# Patient Record
Sex: MALE | Race: WHITE | NOT HISPANIC OR LATINO | Employment: OTHER | URBAN - METROPOLITAN AREA
[De-identification: names, ages, dates, MRNs, and addresses within clinical notes are randomized per-mention and may not be internally consistent; named-entity substitution may affect disease eponyms.]

---

## 2018-08-07 ENCOUNTER — OFFICE VISIT CONVERTED (OUTPATIENT)
Dept: PODIATRY | Facility: CLINIC | Age: 73
End: 2018-08-07
Attending: PODIATRIST

## 2019-08-07 ENCOUNTER — OFFICE VISIT CONVERTED (OUTPATIENT)
Dept: PODIATRY | Facility: CLINIC | Age: 74
End: 2019-08-07
Attending: PODIATRIST

## 2019-08-07 ENCOUNTER — HOSPITAL ENCOUNTER (OUTPATIENT)
Dept: LAB | Facility: HOSPITAL | Age: 74
Discharge: HOME | End: 2019-08-07
Attending: INTERNAL MEDICINE

## 2019-08-07 LAB
25(OH)D3 SERPL-MCNC: 40 NG/ML (ref 30–100)
ALBUMIN SERPL-MCNC: 4.3 G/DL (ref 3.5–5)
ALBUMIN/GLOB SERPL: 1.1 {RATIO} (ref 1.4–2.6)
ALP SERPL-CCNC: 99 U/L (ref 56–155)
ALT SERPL-CCNC: 26 U/L (ref 10–40)
AMYLASE SERPL-CCNC: 68 U/L (ref 30–150)
ANION GAP SERPL CALC-SCNC: 19 MMOL/L (ref 8–19)
AST SERPL-CCNC: 22 U/L (ref 15–50)
BASOPHILS # BLD AUTO: 0.03 10*3/UL (ref 0–0.2)
BASOPHILS NFR BLD AUTO: 0.4 % (ref 0–3)
BILIRUB SERPL-MCNC: 0.47 MG/DL (ref 0.2–1.3)
BUN SERPL-MCNC: 20 MG/DL (ref 5–25)
BUN/CREAT SERPL: 15 {RATIO} (ref 6–20)
CALCIUM SERPL-MCNC: 10 MG/DL (ref 8.7–10.4)
CHLORIDE SERPL-SCNC: 99 MMOL/L (ref 99–111)
CHOLEST SERPL-MCNC: 92 MG/DL (ref 107–200)
CHOLEST/HDLC SERPL: 2.2 {RATIO} (ref 3–6)
CONV ABS IMM GRAN: 0.02 10*3/UL (ref 0–0.2)
CONV CO2: 23 MMOL/L (ref 22–32)
CONV IMMATURE GRAN: 0.3 % (ref 0–1.8)
CONV TOTAL PROTEIN: 8.2 G/DL (ref 6.3–8.2)
CREAT UR-MCNC: 1.37 MG/DL (ref 0.7–1.2)
DEPRECATED RDW RBC AUTO: 43.8 FL (ref 35.1–43.9)
EOSINOPHIL # BLD AUTO: 0.16 10*3/UL (ref 0–0.7)
EOSINOPHIL # BLD AUTO: 2.2 % (ref 0–7)
ERYTHROCYTE [DISTWIDTH] IN BLOOD BY AUTOMATED COUNT: 13.5 % (ref 11.6–14.4)
ERYTHROCYTE [SEDIMENTATION RATE] IN BLOOD: 26 MM/H (ref 0–20)
EST. AVERAGE GLUCOSE BLD GHB EST-MCNC: 134 MG/DL
GFR SERPLBLD BASED ON 1.73 SQ M-ARVRAT: 51 ML/MIN/{1.73_M2}
GLOBULIN UR ELPH-MCNC: 3.9 G/DL (ref 2–3.5)
GLUCOSE SERPL-MCNC: 102 MG/DL (ref 70–99)
HBA1C MFR BLD: 6.3 % (ref 3.5–5.7)
HCT VFR BLD AUTO: 42.1 % (ref 42–52)
HDLC SERPL-MCNC: 41 MG/DL (ref 40–60)
HGB BLD-MCNC: 14 G/DL (ref 14–18)
IRON SATN MFR SERPL: 19 % (ref 20–55)
IRON SERPL-MCNC: 72 UG/DL (ref 70–180)
LDLC SERPL CALC-MCNC: 28 MG/DL (ref 70–100)
LIPASE SERPL-CCNC: 73 U/L (ref 5–51)
LYMPHOCYTES # BLD AUTO: 3 10*3/UL (ref 1–5)
LYMPHOCYTES NFR BLD AUTO: 41.8 % (ref 20–45)
MCH RBC QN AUTO: 29.7 PG (ref 27–31)
MCHC RBC AUTO-ENTMCNC: 33.3 G/DL (ref 33–37)
MCV RBC AUTO: 89.2 FL (ref 80–96)
MONOCYTES # BLD AUTO: 0.56 10*3/UL (ref 0.2–1.2)
MONOCYTES NFR BLD AUTO: 7.8 % (ref 3–10)
NEUTROPHILS # BLD AUTO: 3.4 10*3/UL (ref 2–8)
NEUTROPHILS NFR BLD AUTO: 47.5 % (ref 30–85)
NRBC CBCN: 0 % (ref 0–0.7)
OSMOLALITY SERPL CALC.SUM OF ELEC: 285 MOSM/KG (ref 273–304)
PLATELET # BLD AUTO: 257 10*3/UL (ref 130–400)
PMV BLD AUTO: 9.7 FL (ref 9.4–12.4)
POTASSIUM SERPL-SCNC: 4.8 MMOL/L (ref 3.5–5.3)
PSA SERPL-MCNC: 2.02 NG/ML (ref 0–4)
RBC # BLD AUTO: 4.72 10*6/UL (ref 4.7–6.1)
SODIUM SERPL-SCNC: 136 MMOL/L (ref 135–147)
T4 FREE SERPL-MCNC: 1.1 NG/DL (ref 0.9–1.8)
TESTOST SERPL-MCNC: 403 NG/DL (ref 193–740)
TIBC SERPL-MCNC: 388 UG/DL (ref 245–450)
TRANSFERRIN SERPL-MCNC: 271 MG/DL (ref 215–365)
TRIGL SERPL-MCNC: 117 MG/DL (ref 40–150)
TSH SERPL-ACNC: 2.3 M[IU]/L (ref 0.27–4.2)
VLDLC SERPL-MCNC: 23 MG/DL (ref 5–37)
WBC # BLD AUTO: 7.17 10*3/UL (ref 4.8–10.8)

## 2019-08-14 ENCOUNTER — HOSPITAL ENCOUNTER (OUTPATIENT)
Dept: GENERAL RADIOLOGY | Facility: HOSPITAL | Age: 74
Discharge: HOME | End: 2019-08-14
Attending: INTERNAL MEDICINE

## 2019-08-19 ENCOUNTER — OFFICE VISIT CONVERTED (OUTPATIENT)
Dept: SURGERY | Facility: CLINIC | Age: 74
End: 2019-08-19
Attending: SURGERY

## 2019-10-10 ENCOUNTER — HOSPITAL ENCOUNTER (OUTPATIENT)
Dept: GASTROENTEROLOGY | Facility: HOSPITAL | Age: 74
Setting detail: HOSPITAL OUTPATIENT SURGERY
Discharge: HOME | End: 2019-10-10
Attending: SURGERY

## 2019-10-10 LAB — GLUCOSE BLD-MCNC: 84 MG/DL (ref 70–99)

## 2019-10-18 ENCOUNTER — OFFICE VISIT CONVERTED (OUTPATIENT)
Dept: SURGERY | Facility: CLINIC | Age: 74
End: 2019-10-18
Attending: SURGERY

## 2020-02-17 ENCOUNTER — OFFICE VISIT CONVERTED (OUTPATIENT)
Dept: PODIATRY | Facility: CLINIC | Age: 75
End: 2020-02-17
Attending: PODIATRIST

## 2020-03-09 ENCOUNTER — HOSPITAL ENCOUNTER (OUTPATIENT)
Dept: LAB | Facility: HOSPITAL | Age: 75
Discharge: HOME | End: 2020-03-09
Attending: INTERNAL MEDICINE

## 2020-03-09 ENCOUNTER — HOSPITAL ENCOUNTER (OUTPATIENT)
Dept: GENERAL RADIOLOGY | Facility: HOSPITAL | Age: 75
Discharge: HOME | End: 2020-03-09
Attending: INTERNAL MEDICINE

## 2020-03-09 LAB
ALBUMIN SERPL-MCNC: 4.1 G/DL (ref 3.5–5)
ALBUMIN/GLOB SERPL: 1.2 {RATIO} (ref 1.4–2.6)
ALP SERPL-CCNC: 83 U/L (ref 56–155)
ALT SERPL-CCNC: 20 U/L (ref 10–40)
ANION GAP SERPL CALC-SCNC: 18 MMOL/L (ref 8–19)
AST SERPL-CCNC: 19 U/L (ref 15–50)
BASOPHILS # BLD AUTO: 0.04 10*3/UL (ref 0–0.2)
BASOPHILS NFR BLD AUTO: 0.6 % (ref 0–3)
BILIRUB SERPL-MCNC: 0.36 MG/DL (ref 0.2–1.3)
BUN SERPL-MCNC: 14 MG/DL (ref 5–25)
BUN/CREAT SERPL: 12 {RATIO} (ref 6–20)
CALCIUM SERPL-MCNC: 9.2 MG/DL (ref 8.7–10.4)
CHLORIDE SERPL-SCNC: 103 MMOL/L (ref 99–111)
CHOLEST SERPL-MCNC: 96 MG/DL (ref 107–200)
CHOLEST/HDLC SERPL: 2.5 {RATIO} (ref 3–6)
CONV ABS IMM GRAN: 0.03 10*3/UL (ref 0–0.2)
CONV CO2: 21 MMOL/L (ref 22–32)
CONV IMMATURE GRAN: 0.4 % (ref 0–1.8)
CONV TOTAL PROTEIN: 7.4 G/DL (ref 6.3–8.2)
CREAT BLD-MCNC: 1.2 MG/DL (ref 0.6–1.4)
CREAT UR-MCNC: 1.17 MG/DL (ref 0.7–1.2)
DEPRECATED RDW RBC AUTO: 47.2 FL (ref 35.1–43.9)
EOSINOPHIL # BLD AUTO: 0.17 10*3/UL (ref 0–0.7)
EOSINOPHIL # BLD AUTO: 2.4 % (ref 0–7)
ERYTHROCYTE [DISTWIDTH] IN BLOOD BY AUTOMATED COUNT: 14.7 % (ref 11.6–14.4)
EST. AVERAGE GLUCOSE BLD GHB EST-MCNC: 134 MG/DL
GFR SERPLBLD BASED ON 1.73 SQ M-ARVRAT: 60 ML/MIN/{1.73_M2}
GFR SERPLBLD BASED ON 1.73 SQ M-ARVRAT: >60 ML/MIN/{1.73_M2}
GLOBULIN UR ELPH-MCNC: 3.3 G/DL (ref 2–3.5)
GLUCOSE SERPL-MCNC: 86 MG/DL (ref 70–99)
HBA1C MFR BLD: 6.3 % (ref 3.5–5.7)
HCT VFR BLD AUTO: 41 % (ref 42–52)
HDLC SERPL-MCNC: 38 MG/DL (ref 40–60)
HGB BLD-MCNC: 13.4 G/DL (ref 14–18)
LDLC SERPL CALC-MCNC: 38 MG/DL (ref 70–100)
LYMPHOCYTES # BLD AUTO: 3.01 10*3/UL (ref 1–5)
LYMPHOCYTES NFR BLD AUTO: 42.2 % (ref 20–45)
MCH RBC QN AUTO: 28.8 PG (ref 27–31)
MCHC RBC AUTO-ENTMCNC: 32.7 G/DL (ref 33–37)
MCV RBC AUTO: 88.2 FL (ref 80–96)
MONOCYTES # BLD AUTO: 0.71 10*3/UL (ref 0.2–1.2)
MONOCYTES NFR BLD AUTO: 10 % (ref 3–10)
NEUTROPHILS # BLD AUTO: 3.17 10*3/UL (ref 2–8)
NEUTROPHILS NFR BLD AUTO: 44.4 % (ref 30–85)
NRBC CBCN: 0 % (ref 0–0.7)
OSMOLALITY SERPL CALC.SUM OF ELEC: 284 MOSM/KG (ref 273–304)
PLATELET # BLD AUTO: 245 10*3/UL (ref 130–400)
PMV BLD AUTO: 9.5 FL (ref 9.4–12.4)
POTASSIUM SERPL-SCNC: 4.5 MMOL/L (ref 3.5–5.3)
RBC # BLD AUTO: 4.65 10*6/UL (ref 4.7–6.1)
SODIUM SERPL-SCNC: 137 MMOL/L (ref 135–147)
TRIGL SERPL-MCNC: 100 MG/DL (ref 40–150)
VLDLC SERPL-MCNC: 20 MG/DL (ref 5–37)
WBC # BLD AUTO: 7.13 10*3/UL (ref 4.8–10.8)

## 2020-08-07 ENCOUNTER — OFFICE VISIT CONVERTED (OUTPATIENT)
Dept: PODIATRY | Facility: CLINIC | Age: 75
End: 2020-08-07
Attending: PODIATRIST

## 2020-09-04 ENCOUNTER — HOSPITAL ENCOUNTER (OUTPATIENT)
Dept: GENERAL RADIOLOGY | Facility: HOSPITAL | Age: 75
Discharge: HOME | End: 2020-09-04
Attending: INTERNAL MEDICINE

## 2020-09-28 ENCOUNTER — HOSPITAL ENCOUNTER (OUTPATIENT)
Dept: LAB | Facility: HOSPITAL | Age: 75
Discharge: HOME | End: 2020-09-28
Attending: INTERNAL MEDICINE

## 2020-09-28 LAB
ALBUMIN SERPL-MCNC: 4.1 G/DL (ref 3.5–5)
ALBUMIN/GLOB SERPL: 1.3 {RATIO} (ref 1.4–2.6)
ALP SERPL-CCNC: 85 U/L (ref 56–155)
ALT SERPL-CCNC: 21 U/L (ref 10–40)
ANION GAP SERPL CALC-SCNC: 15 MMOL/L (ref 8–19)
AST SERPL-CCNC: 19 U/L (ref 15–50)
BASOPHILS # BLD AUTO: 0.03 10*3/UL (ref 0–0.2)
BASOPHILS NFR BLD AUTO: 0.4 % (ref 0–3)
BILIRUB SERPL-MCNC: 0.26 MG/DL (ref 0.2–1.3)
BUN SERPL-MCNC: 18 MG/DL (ref 5–25)
BUN/CREAT SERPL: 15 {RATIO} (ref 6–20)
CALCIUM SERPL-MCNC: 9.6 MG/DL (ref 8.7–10.4)
CHLORIDE SERPL-SCNC: 105 MMOL/L (ref 99–111)
CHOLEST SERPL-MCNC: 134 MG/DL (ref 107–200)
CHOLEST/HDLC SERPL: 3.7 {RATIO} (ref 3–6)
CONV ABS IMM GRAN: 0.03 10*3/UL (ref 0–0.2)
CONV CO2: 24 MMOL/L (ref 22–32)
CONV IMMATURE GRAN: 0.4 % (ref 0–1.8)
CONV TOTAL PROTEIN: 7.2 G/DL (ref 6.3–8.2)
CREAT UR-MCNC: 1.22 MG/DL (ref 0.7–1.2)
DEPRECATED RDW RBC AUTO: 46.5 FL (ref 35.1–43.9)
EOSINOPHIL # BLD AUTO: 0.15 10*3/UL (ref 0–0.7)
EOSINOPHIL # BLD AUTO: 1.8 % (ref 0–7)
ERYTHROCYTE [DISTWIDTH] IN BLOOD BY AUTOMATED COUNT: 13.7 % (ref 11.6–14.4)
EST. AVERAGE GLUCOSE BLD GHB EST-MCNC: 131 MG/DL
GFR SERPLBLD BASED ON 1.73 SQ M-ARVRAT: 58 ML/MIN/{1.73_M2}
GLOBULIN UR ELPH-MCNC: 3.1 G/DL (ref 2–3.5)
GLUCOSE SERPL-MCNC: 94 MG/DL (ref 70–99)
HBA1C MFR BLD: 6.2 % (ref 3.5–5.7)
HCT VFR BLD AUTO: 40.9 % (ref 42–52)
HDLC SERPL-MCNC: 36 MG/DL (ref 40–60)
HGB BLD-MCNC: 13.1 G/DL (ref 14–18)
LDLC SERPL CALC-MCNC: 50 MG/DL (ref 70–100)
LYMPHOCYTES # BLD AUTO: 2.82 10*3/UL (ref 1–5)
LYMPHOCYTES NFR BLD AUTO: 34.7 % (ref 20–45)
MCH RBC QN AUTO: 29.4 PG (ref 27–31)
MCHC RBC AUTO-ENTMCNC: 32 G/DL (ref 33–37)
MCV RBC AUTO: 91.7 FL (ref 80–96)
MONOCYTES # BLD AUTO: 0.8 10*3/UL (ref 0.2–1.2)
MONOCYTES NFR BLD AUTO: 9.9 % (ref 3–10)
NEUTROPHILS # BLD AUTO: 4.29 10*3/UL (ref 2–8)
NEUTROPHILS NFR BLD AUTO: 52.8 % (ref 30–85)
NRBC CBCN: 0 % (ref 0–0.7)
OSMOLALITY SERPL CALC.SUM OF ELEC: 290 MOSM/KG (ref 273–304)
PLATELET # BLD AUTO: 243 10*3/UL (ref 130–400)
PMV BLD AUTO: 10.2 FL (ref 9.4–12.4)
POTASSIUM SERPL-SCNC: 4.8 MMOL/L (ref 3.5–5.3)
RBC # BLD AUTO: 4.46 10*6/UL (ref 4.7–6.1)
SODIUM SERPL-SCNC: 139 MMOL/L (ref 135–147)
TRIGL SERPL-MCNC: 241 MG/DL (ref 40–150)
VLDLC SERPL-MCNC: 48 MG/DL (ref 5–37)
WBC # BLD AUTO: 8.12 10*3/UL (ref 4.8–10.8)

## 2021-05-13 NOTE — PROGRESS NOTES
Progress Note      Patient Name: Sajan Hermosillo   Patient ID: [id number]   Sex: Male   YOB: 1945    Primary Care Provider: Juarez Arrington MD   Referring Provider: Juarez Arrington MD    Visit Date: August 7, 2020    Provider: Jeff Gross DPM   Location: [facility]   Location Address: [address] [place] [address]  [id number]   Location Phone: [phone]          Chief Complaint  · Diabetic Foot Evaluation      History Of Present Illness  Sajan Hermosillo presents to the office today as a Follow-Up for a diabetic foot evaluation.   Patient reports that he is a diabetic currently controlling diabetes with insulin and oral medication.      New, Established, New Problem:  Established   Location:  Feet bilaterally  Duration: 2013  Onset:  Gradual  Nature:  IDDM  Stable, worsening, improving: Stable   Aggravating factors:   None reported  Previous Treatment:  Treating with oral medication and insulin.    Patient denies any fevers, chills, nausea, vomiting, shortness of breath or any other constitutional signs nor symptoms.      Patient reports that no changes in their medications with their recent appointment with their primary care provider.    The patient's most recent blood glucose reading was 96.    Pt states their most recent HgB A1C was 6.2.       Past Medical History  Abdominal pain; Ankle pain; Arthritis; Fracture; Heel pain; High cholesterol; Hyperlipemia; Hypertension; Ingrowing toenail; Limb Swelling; Prostate Disorder; Seasonal allergies; Type 2 diabetes mellitus         Past Surgical History  Artificial Joints/Limbs; Colonoscopy; EGD; Joint Surgery; Metal implants         Medication List  Aspir-81 81 mg oral tablet,delayed release (DR/EC); atorvastatin 40 mg oral tablet; cetirizine 10 mg oral tablet; citalopram 20 mg oral tablet; fluticasone 50 mcg/actuation nasal spray,suspension; Lantus 100 unit/mL subcutaneous solution; lisinopril  "20 mg oral tablet; metformin 1,000 mg oral tablet; montelukast 10 mg oral tablet; Naprosyn 500 mg oral tablet; naproxen 500 mg oral tablet; Nexium 40 mg oral capsule,delayed release(DR/EC); nitroglycerin 0.4 mg sublingual tablet, sublingual; olopatadine 0.1 % ophthalmic drops; pramipexole 0.25 mg oral tablet; prazosin 2 mg oral capsule; Refresh Tears 0.5 % ophthalmic drops; Toprol XL 50 mg oral tablet extended release 24 hr         Allergy List  hydrocodone bitartrate; hydrocodone-acetaminophen; hydrocodone-ibuprofen; penicillin V potassium; PENICILLINS         Family Medical History  Heart Disease; Diabetes, unspecified type; Family history of certain chronic disabling diseases; arthritis         Social History  Alcohol Use (Never); lives with spouse; .; Recreational Drug Use (Never); Retired.; Tobacco (Former)         Review of Systems  · Constitutional  o Denies  o : fatigue, night sweats  · Eyes  o Denies  o : double vision, blurred vision  · HENT  o Denies  o : vertigo, recent head injury  · Cardiovascular  o Denies  o : chest pain, irregular heart beats  · Respiratory  o Denies  o : shortness of breath, productive cough  · Gastrointestinal  o Denies  o : nausea, vomiting  · Genitourinary  o Denies  o : dysuria, urinary retention  · Integument  o * See HPI  · Neurologic  o Denies  o : altered mental status, seizures  · Musculoskeletal  o Denies  o : joint swelling, limitation of motion  · Endocrine  o Denies  o : cold intolerance, heat intolerance  · Heme-Lymph  o Denies  o : petechiae, lymph node enlargement or tenderness  · Allergic-Immunologic  o Denies  o : frequent illnesses      Vitals  Date Time BP Position Site L\R Cuff Size HR RR TEMP (F) WT  HT  BMI kg/m2 BSA m2 O2 Sat HC       08/07/2020 08:24 /59 Sitting    67 - R  97.5 221lbs 0oz 5'  10\" 31.71 2.23 99 %          Physical Examination  · Constitutional  o Appearance  o : awake, alert, well-developed, and well nourished "   · Cardiovascular  o Peripheral Vascular System  o :   § Extremities  § : no edema noted  · Musculoskeletal  o Extremeties/Joint  o : Lower extremity muscle strength and range of motion is equal and symmetrical bilaterally. The knees are noted to be in normal alignment. Ankle alignment and range of motion is normal and foot structure is normal. Subtalar, metatarsal and metatarsal-phalangeal joint range of motion is noted to be within normal limits on the Left foot. No range of motion of the Choparts and Subtalar joints consistent with the patients surgical history on the Right. The digits of both feet are in normal alignment. The gait is normal.  · Left DM Foot Exam  o Sensation  o : Sharp/dull sensation is within normal limits. [place]-Krishna 5.07 monofilament intact to all assessed areas.   o Visual Inspection  o : Skin is noted to have normal texture and turgor, with no excrescences noted. The toenails are noted to be without disease  o Vascular  o : palpable dorsalis pedis and posterir tibialis pulses present, normal capillary refill  · Right DM Foot Exam  o Sensation  o : Sharp/dull sensation is within normal limits. [place]-Krishna 5.07 monofilament intact to all assessed areas.   o Visual Inspection  o : Skin is noted to have normal texture and turgor, with no excrescences noted. The toenails are noted to be without disease  o Vascular  o : palpable dorsalis pedis and posterir tibialis pulses present, normal capillary refill          Assessment  · Type 2 diabetes mellitus     250.00/E11.9      Plan  · Orders  o Diabetic Foot (Motor and Sensory) Exam Completed [facility] (, , 2030F) - - 08/07/2020  · Medications  o Medications have been Reconciled  o Transition of Care or Provider Policy  · Instructions  o I have discussed the findings of this evaluation with the patient. The discussion included a complete verbal explanation of any changes in the examination results, diagnosis, and the current treatment  plan. A schedule for future care needs was explained. If any questions should arise after returning home, I have encouraged the patient to feel free to contact Dr. Gross. The patient states understanding and agreement with this plan.   o Patient is to monitor for problems and to contact Dr. Gross for follow-up should such signs occur. Patient states understanding and agreement with this plan.   o Encouraged to follow-up with Primary Care Provider for preventative care.   o Diabetic foot exam performed and documented this date, compliant with CQM required standards. Detail of findings as noted in physical exam.Lower extremity Neuro exam for diabetic patient performed and documented this date, compliant with PQRS required standards. Detail of findings as noted in physical exam.Advised patient importance of good routine lower extremity hygiene. Advised patient importance of evaluating for intact skin and pain free nail borders. Advised patient to use mirror to evaluate plantar/ soles of feet for better visualization. Advised patient monitor and phone office to be seen if any cracking to skin, open lesions, painful nail borders or if nails become elongated prior to next visit. Advised patient importance of daily cleansing of lower extremities, followed by good skin cream to maintain normal hydration of skin. Also advised patient importance of close daily monitoring of blood sugar. Advised to regulate diet and medications to maintain control of blood sugar in optimal range. Contact primary care provider if difficulties maintaining blood sugar levels.Advised Patient of presence of Diabetes Mellitus condition. Advised Patient risk of progression and worsening or improvement, then return of condition. Will monitor condition for any change in future. Treat with most appropriate treatment pending status of condition. Counseled and advised patient extensively on nature and ramifications of diabetes. Standard instructions  given to patient for good diabetic foot care and maintenance. Advised importance of careful monitoring to avoid break down and complications secondary to diabetes. Advised patient importance of strict maintenance of blood sugar control. Advised patient of possible ominous results from neglect of condition, i.e.: amputation/ loss of digits, feet and legs, or even death.Patient states understands counseling, will monitor closely, continue good hygiene and routine diabetic foot care. Patient will contact office is questions or problems.   o Follow up in one year for Podiatric Diabetic Evaluation.   o Electronically Identified Patient Education Materials Provided Electronically  · Disposition  o Call or Return if symptoms worsen or persist.            Electronically Signed by: Jeff Gross DPM -Author on August 7, 2020 08:55:50 AM

## 2021-05-15 VITALS — WEIGHT: 213 LBS | RESPIRATION RATE: 14 BRPM | BODY MASS INDEX: 30.49 KG/M2 | HEIGHT: 70 IN

## 2021-05-15 VITALS — WEIGHT: 210 LBS | HEIGHT: 70 IN | RESPIRATION RATE: 18 BRPM | BODY MASS INDEX: 30.06 KG/M2

## 2021-05-15 VITALS
BODY MASS INDEX: 31.64 KG/M2 | TEMPERATURE: 97.5 F | HEIGHT: 70 IN | WEIGHT: 221 LBS | SYSTOLIC BLOOD PRESSURE: 116 MMHG | HEART RATE: 67 BPM | OXYGEN SATURATION: 99 % | DIASTOLIC BLOOD PRESSURE: 59 MMHG

## 2021-05-15 VITALS
DIASTOLIC BLOOD PRESSURE: 69 MMHG | WEIGHT: 220 LBS | HEART RATE: 72 BPM | OXYGEN SATURATION: 98 % | BODY MASS INDEX: 31.5 KG/M2 | HEIGHT: 70 IN | SYSTOLIC BLOOD PRESSURE: 132 MMHG

## 2021-05-15 VITALS
HEIGHT: 70 IN | HEART RATE: 67 BPM | BODY MASS INDEX: 30.49 KG/M2 | SYSTOLIC BLOOD PRESSURE: 117 MMHG | DIASTOLIC BLOOD PRESSURE: 64 MMHG | OXYGEN SATURATION: 100 % | WEIGHT: 213 LBS

## 2021-05-16 VITALS — OXYGEN SATURATION: 98 % | HEIGHT: 70 IN | BODY MASS INDEX: 32.21 KG/M2 | WEIGHT: 225 LBS | HEART RATE: 67 BPM

## 2021-06-04 ENCOUNTER — HOSPITAL ENCOUNTER (OUTPATIENT)
Dept: GENERAL RADIOLOGY | Facility: HOSPITAL | Age: 76
Discharge: HOME | End: 2021-06-04
Attending: INTERNAL MEDICINE

## 2021-08-06 ENCOUNTER — OFFICE VISIT (OUTPATIENT)
Dept: PODIATRY | Facility: CLINIC | Age: 76
End: 2021-08-06

## 2021-08-06 VITALS
HEIGHT: 70 IN | WEIGHT: 212 LBS | OXYGEN SATURATION: 98 % | HEART RATE: 67 BPM | SYSTOLIC BLOOD PRESSURE: 119 MMHG | BODY MASS INDEX: 30.35 KG/M2 | DIASTOLIC BLOOD PRESSURE: 64 MMHG | TEMPERATURE: 97.8 F

## 2021-08-06 DIAGNOSIS — E11.8 DIABETIC FOOT (HCC): Primary | ICD-10-CM

## 2021-08-06 DIAGNOSIS — E11.9 NON-INSULIN DEPENDENT TYPE 2 DIABETES MELLITUS (HCC): ICD-10-CM

## 2021-08-06 PROCEDURE — 99213 OFFICE O/P EST LOW 20 MIN: CPT | Performed by: PODIATRIST

## 2021-08-06 PROCEDURE — G8404 LOW EXTEMITY NEUR EXAM DOCUM: HCPCS | Performed by: PODIATRIST

## 2021-08-06 RX ORDER — CITALOPRAM 20 MG/1
TABLET ORAL
COMMUNITY

## 2021-08-06 RX ORDER — MONTELUKAST SODIUM 10 MG/1
TABLET ORAL
COMMUNITY

## 2021-08-06 RX ORDER — ASPIRIN 81 MG/1
TABLET ORAL
COMMUNITY

## 2021-08-06 RX ORDER — LISINOPRIL 20 MG/1
TABLET ORAL
COMMUNITY

## 2021-08-06 RX ORDER — CARBOXYMETHYLCELLULOSE SODIUM 5 MG/ML
1 SOLUTION/ DROPS OPHTHALMIC
COMMUNITY

## 2021-08-06 RX ORDER — ATORVASTATIN CALCIUM 40 MG/1
TABLET, FILM COATED ORAL
COMMUNITY

## 2021-08-06 RX ORDER — METOPROLOL SUCCINATE 50 MG/1
TABLET, EXTENDED RELEASE ORAL
COMMUNITY

## 2021-08-06 RX ORDER — FLUTICASONE PROPIONATE 50 MCG
SPRAY, SUSPENSION (ML) NASAL
COMMUNITY

## 2021-08-06 RX ORDER — INSULIN GLARGINE 100 [IU]/ML
50 INJECTION, SOLUTION SUBCUTANEOUS DAILY
COMMUNITY

## 2021-08-06 RX ORDER — CETIRIZINE HYDROCHLORIDE 10 MG/1
TABLET ORAL
COMMUNITY

## 2021-08-06 RX ORDER — ESOMEPRAZOLE MAGNESIUM 40 MG/1
CAPSULE, DELAYED RELEASE ORAL
COMMUNITY

## 2021-08-06 RX ORDER — KETOCONAZOLE 20 MG/G
CREAM TOPICAL
COMMUNITY

## 2021-08-06 RX ORDER — NITROGLYCERIN 0.4 MG/1
TABLET SUBLINGUAL
COMMUNITY

## 2021-08-06 NOTE — PROGRESS NOTES
[facility] - PODIATRY    Today's Date: 08/06/21    Patient Name: Sajan Hermosillo  MRN: [id number]  CSN: [id number]  PCP: Juarez Arrington MD  Referring Provider: No ref. provider found    SUBJECTIVE     Chief Complaint   Patient presents with   • Left Foot - Diabetes, Annual Exam   • Right Foot - Diabetes, Annual Exam     HPI: Sajan Hermosillo, a 76 y.o.male, comes presents to clinic for a diabetic foot evaluation.    New, Established, New Problem: established    Location: Bilateral feet    Duration: 2013    Onset: Insidious    Nature: NIDDM    Stable, worsening, improving: Stable    Patient controlling diabetes via: Oral medications    Patient states their most recent blood glucose reading was 97.    Patient denies any fevers, chills, nausea, vomiting, shortness of breathe, nor any other constitutional signs nor symptoms.    No other pedal complaints at this time.    Past Medical History:   Diagnosis Date   • Abdominal pain    • Ankle pain    • Arthritis    • Fracture    • Heel pain    • High cholesterol    • HTN (hypertension)    • Hyperlipemia    • Ingrowing toenail    • Limb swelling    • Prostate disorder    • Seasonal allergies    • Type 2 diabetes mellitus (CMS/HCC)      Past Surgical History:   Procedure Laterality Date   • COLONOSCOPY     • ENDOSCOPY     • JOINT REPLACEMENT     • OTHER SURGICAL HISTORY      Artificial joints/limbs   • OTHER SURGICAL HISTORY      Metal Implants     Family History   Problem Relation Age of Onset   • Heart disease Mother    • Diabetes Mother         unspecified type   • Arthritis Mother    • Arthritis Father    • Heart disease Sister    • Diabetes Sister         unspecified type   • Heart disease Brother    • Arthritis Brother      Social History     Socioeconomic History   • Marital status:      Spouse name: Not on file   • Number of children: Not on file   • Years of education: Not on file   • Highest education level: Not on file   Tobacco Use   •  Smoking status: Former Smoker     Types: Cigarettes   • Smokeless tobacco: Former User   Vaping Use   • Vaping Use: Never used   Substance and Sexual Activity   • Alcohol use: Never   • Drug use: Never   • Sexual activity: Defer     Allergies   Allergen Reactions   • Hydrocodone Rash   • Hydrocodone-Acetaminophen Rash   • Hydrocodone-Ibuprofen Rash   • Oxycodone Rash   • Penicillins Rash     Current Outpatient Medications   Medication Sig Dispense Refill   • aspirin (aspirin) 81 MG EC tablet Aspir-81 81 mg oral tablet,delayed release (DR/EC) take 1 tablet (81 mg) by oral route once daily   Active     • atorvastatin (LIPITOR) 40 MG tablet atorvastatin 40 mg oral tablet take 1 tablet (40 mg) by oral route once daily   Active     • carboxymethylcellulose (REFRESH PLUS) 0.5 % solution 1 drop.     • cetirizine (zyrTEC) 10 MG tablet cetirizine 10 mg oral tablet take 1 tablet (10 mg) by oral route once daily   Active     • citalopram (CeleXA) 20 MG tablet citalopram 20 mg oral tablet take 1 tablet (20 mg) by oral route once daily   Active     • diclofenac (VOLTAREN) 50 MG EC tablet Take 50 mg by mouth 2 (Two) Times a Day.     • esomeprazole (nexIUM) 40 MG capsule Nexium 40 mg oral capsule,delayed release(DR/EC) take 1 capsule (40 mg) by oral route once daily   Active     • fluticasone (FLONASE) 50 MCG/ACT nasal spray fluticasone 50 mcg/actuation nasal spray,suspension spray 1 - 2 sprays (50 - 100 mcg) in each nostril by intranasal route once daily as needed   Active     • insulin glargine (Lantus) 100 UNIT/ML injection Lantus 100 unit/mL subcutaneous solution inject by subcutaneous route as per insulin protocol   Active     • ketoconazole (NIZORAL) 2 % cream ketoconazole 2 % topical cream apply to the affected area(s) by topical route once daily   Suspended     • lisinopril (PRINIVIL,ZESTRIL) 20 MG tablet lisinopril 20 mg oral tablet take 1 tablet (20 mg) by oral route once daily   Active     • metFORMIN (GLUCOPHAGE) 1000  MG tablet metformin 1,000 mg oral tablet take 1 tablet (1,000 mg) by oral route 2 times per day with morning and evening meals   Active     • metoprolol succinate XL (Toprol XL) 50 MG 24 hr tablet Toprol XL 50 mg oral tablet extended release 24 hr take 1 tablet (50 mg) by oral route once daily   Active     • montelukast (SINGULAIR) 10 MG tablet montelukast 10 mg oral tablet take 1 tablet (10 mg) by oral route once daily in the evening   Active     • nitroglycerin (NITROSTAT) 0.4 MG SL tablet nitroglycerin 0.4 mg sublingual tablet, sublingual place 1 tablet (0.4 mg) by buccal route at the first sign of an attack; no more than 3 tabs are recommended within a 15 minute period.   Active     • Semaglutide, 1 MG/DOSE, (OZEMPIC) 2 MG/1.5ML solution pen-injector Inject  under the skin into the appropriate area as directed 1 (One) Time Per Week.       No current facility-administered medications for this visit.     Review of Systems   Constitutional: Negative.    All other systems reviewed and are negative.      OBJECTIVE     Vitals:    08/06/21 0747   BP: 119/64   Pulse: 67   Temp: 97.8 °F (36.6 °C)   SpO2: 98%       Body mass index is 30.42 kg/m².    Lab Results   Component Value Date    HGBA1C 6.2 (H) 09/28/2020       Lab Results   Component Value Date    CALCIUM 9.6 09/28/2020     09/28/2020    K 4.8 09/28/2020    CO2 24 09/28/2020     09/28/2020    BUN 18 09/28/2020    CREATININE 1.22 (H) 09/28/2020    BCR 15 09/28/2020    ANIONGAP 15 09/28/2020       Patient seen in no apparent distress.      PHYSICAL EXAM:     Foot/Ankle Exam:       General:   Appearance: appears stated age and healthy    Orientation: AAOx3    Affect: appropriate    Gait: unimpaired    Shoe Gear:  Casual shoes    VASCULAR      Right Foot Vascularity   Normal vascular exam    Dorsalis pedis:  2+  Posterior tibial:  2+  Skin Temperature: warm    Edema Grading:  None  CFT:  < 3 seconds  Pedal Hair Growth:  Present  Varicosities: none        Left Foot Vascularity   Normal vascular exam    Dorsalis pedis:  2+  Posterior tibial:  2+  Skin Temperature: warm    Edema Grading:  None  CFT:  < 3 seconds  Pedal Hair Growth:  Present  Varicosities: none        NEUROLOGIC     Right Foot Neurologic   Normal sensation    Light touch sensation:  Normal  Vibratory sensation:  Normal  Hot/Cold sensation: normal    Protective Sensation using [place]-Krishna Monofilament:  10     Left Foot Neurologic   Normal sensation    Light touch sensation:  Normal  Vibratory sensation:  Normal  Hot/cold sensation: normal    Protective Sensation using [place]-Krishna Monofilament:  10     MUSCULOSKELETAL      Left Foot Musculoskeletal   Hallux limitus: Yes       MUSCLE STRENGTH     Right Foot Muscle Strength   Normal strength    Foot dorsiflexion:  5  Foot plantar flexion:  5  Foot inversion:  5  Foot eversion:  5     Left Foot Muscle Strength   Normal strength    Foot dorsiflexion:  5  Foot plantar flexion:  5  Foot inversion:  5  Foot eversion:  5     RANGE OF MOTION      Right Foot Range of Motion   Foot and ankle ROM within normal limits    Right ankle active dorsiflexion: No range of motion of Chopart's and subtalar joints consistent with past surgical history of fusions of these joints.     Left Foot Range of Motion   Foot and ankle ROM within normal limits       DERMATOLOGIC     Right Foot Dermatologic   Skin: skin intact    Nails: normal       Left Foot Dermatologic   Skin: skin intact    Nails: normal              ASSESSMENT/PLAN     Diagnoses and all orders for this visit:    1. Diabetic foot (CMS/HCC) (Primary)    2. Non-insulin dependent type 2 diabetes mellitus (CMS/HCC)        Comprehensive lower extremity examination and evaluation was performed.    Discussed findings and treatment plan including risks, benefits, and treatment options with patient in detail. Patient agreed with treatment plan.    Diabetic foot exam performed and documented this date, compliant with  CQM required standards. Detail of findings as noted in physical exam.  Lower extremity Neurologic exam for diabetic patient performed and documented this date, compliant with PQRS required standards. Detail of findings as noted in physical exam.  Advised patient importance of good routine lower extremity hygiene. Advised patient importance of evaluating for intact skin and pain free nail borders.  Advised patient to use mirror to evaluate plantar/ soles of feet for better visualization. Advised patient monitor and phone office to be seen if any cracking to skin, open lesions, painful nail borders or if nails become elongated prior to next visit. Advised patient importance of daily cleansing of lower extremities, followed by good skin cream to maintain normal hydration of skin. Also advised patient importance of close daily monitoring of blood sugar. Advised to regulate diet and medications to maintain control of blood sugar in optimal range. Contact primary care provider if difficulties maintaining blood sugar levels.  Advised Patient of presence of Diabetes Mellitus condition.  Advised Patient risk of progression and worsening or improvement, then return of condition.  Will monitor condition for any change in future. Treat with most appropriate treatment pending status of condition.  Counseled and advised patient extensively on nature and ramifications of diabetes. Standard instructions given to patient for good diabetic foot care and maintenance. Advised importance of careful monitoring to avoid break down and complications secondary to diabetes. Advised patient importance of strict maintenance of blood sugar control. Advised patient of possible ominous results from neglect of condition, i.e.: amputation/ loss of digits, feet and legs, or even death.  Patient states understands counseling, will monitor closely, continue good hygiene and routine diabetic foot care. Patient will contact office is questions or  problems.      An After Visit Summary was printed and given to the patient at discharge, including (if requested) any available informative/educational handouts regarding diagnosis, treatment, or medications. All questions were answered to patient/family satisfaction. Should symptoms fail to improve or worsen they agree to call or return to clinic or to go to the Emergency Department. Discussed the importance of following up with any needed screening tests/labs/specialist appointments and any requested follow-up recommended by me today. Importance of maintaining follow-up discussed and patient accepts that missed appointments can delay diagnosis and potentially lead to worsening of conditions.    Return for Podiatry Diabetic Foot Exam., or sooner if acute issues arise.    This document has been electronically signed by Jeff Gross DPM on August 6, 2021 08:24 EDT

## 2021-09-25 NOTE — PROGRESS NOTES
"Chief Complaint/ HPI:   Follow-up (6 month/labs) and Hypertension  Patient is here to follow-up with his labs and blood pressure concerns,    Objective   Vital Signs  Vitals:    09/29/21 1233   BP: 125/76   BP Location: Left arm   Patient Position: Sitting   Cuff Size: Adult   Pulse: 75   Resp: 18   Temp: 99.1 °F (37.3 °C)   TempSrc: Tympanic   SpO2: 96%   Weight: 93.4 kg (206 lb)   Height: 177.8 cm (70\")      Body mass index is 29.56 kg/m².  Review of Systems   Constitutional: Negative.    HENT: Negative.    Eyes: Negative.    Respiratory: Negative.    Cardiovascular: Negative.    Gastrointestinal: Negative.    Endocrine: Negative.    Genitourinary: Negative.    Musculoskeletal: Negative.    Allergic/Immunologic: Negative.    Neurological: Negative.    Hematological: Negative.    Psychiatric/Behavioral: Negative.       Physical Exam  Constitutional:       General: He is not in acute distress.     Appearance: Normal appearance.   HENT:      Head: Normocephalic.      Mouth/Throat:      Mouth: Mucous membranes are moist.   Eyes:      Conjunctiva/sclera: Conjunctivae normal.      Pupils: Pupils are equal, round, and reactive to light.   Cardiovascular:      Rate and Rhythm: Normal rate and regular rhythm.      Pulses: Normal pulses.      Heart sounds: Normal heart sounds.   Pulmonary:      Effort: Pulmonary effort is normal.      Breath sounds: Normal breath sounds.   Abdominal:      General: Abdomen is flat. Bowel sounds are normal.      Palpations: Abdomen is soft.   Musculoskeletal:         General: No swelling. Normal range of motion.      Cervical back: Neck supple.   Skin:     General: Skin is warm and dry.      Coloration: Skin is not jaundiced.   Neurological:      General: No focal deficit present.      Mental Status: He is alert and oriented to person, place, and time. Mental status is at baseline.   Psychiatric:         Mood and Affect: Mood normal.         Behavior: Behavior normal.         Thought Content: " Thought content normal.         Judgment: Judgment normal.     Low back area shows no rash, focal tenderness to the left SI joint area,  Result Review :   No results found for: PROBNP, BNP          Lab Results   Component Value Date    TSH 2.300 08/07/2019      Lab Results   Component Value Date    FREET4 1.1 08/07/2019                            ICD-10-CM ICD-9-CM   1. Acute right-sided low back pain without sciatica  M54.5 724.2   2. Gastroesophageal reflux disease without esophagitis  K21.9 530.81   3. Allergic rhinitis due to pollen, unspecified seasonality  J30.1 477.0   4. Hypertension, essential  I10 401.9   5. Mixed hyperlipidemia  E78.2 272.2   6. RLS (restless legs syndrome)  G25.81 333.94   7. Diabetes 1.5, managed as type 2 (HCC)  E13.9 250.00   8. Screening PSA (prostate specific antigen)  Z12.5 V76.44       Assessment and Plan    Diagnoses and all orders for this visit:    1. Acute right-sided low back pain without sciatica (Primary)  -     PSA Screen; Future  -     Hemoglobin A1c; Future  -     Comprehensive Metabolic Panel; Future  -     CBC & Differential; Future  -     Lipid Panel; Future    2. Gastroesophageal reflux disease without esophagitis  -     PSA Screen; Future  -     Hemoglobin A1c; Future  -     Comprehensive Metabolic Panel; Future  -     CBC & Differential; Future  -     Lipid Panel; Future    3. Allergic rhinitis due to pollen, unspecified seasonality  -     PSA Screen; Future  -     Hemoglobin A1c; Future  -     Comprehensive Metabolic Panel; Future  -     CBC & Differential; Future  -     Lipid Panel; Future    4. Hypertension, essential  -     PSA Screen; Future  -     Hemoglobin A1c; Future  -     Comprehensive Metabolic Panel; Future  -     CBC & Differential; Future  -     Lipid Panel; Future    5. Mixed hyperlipidemia  -     PSA Screen; Future  -     Hemoglobin A1c; Future  -     Comprehensive Metabolic Panel; Future  -     CBC & Differential; Future  -     Lipid Panel;  Future    6. RLS (restless legs syndrome)  -     PSA Screen; Future  -     Hemoglobin A1c; Future  -     Comprehensive Metabolic Panel; Future  -     CBC & Differential; Future  -     Lipid Panel; Future    7. Diabetes 1.5, managed as type 2 (HCC)  -     PSA Screen; Future  -     Hemoglobin A1c; Future  -     Comprehensive Metabolic Panel; Future  -     CBC & Differential; Future  -     Lipid Panel; Future    8. Screening PSA (prostate specific antigen)  -     PSA Screen; Future  -     Hemoglobin A1c; Future  -     Comprehensive Metabolic Panel; Future  -     CBC & Differential; Future  -     Lipid Panel; Future      abd pain , wgt loss, back pain, r/o pancreatic isssues, pud , PT HAD CT scan abdomen and pelvis August 2019,---did not show any evidence of pancreatitis or intra-abdominal pathology, ------S/P EGD WITH DR OLSON OCT 2019, -he has some small nodular areas in both lung bases and some interstitial changes -- CT scan of the chest March 2020,  reticular-nodular pattern in the lung bases, NO specific infiltrates or groundglass opacities are noted, F/U CT CHEST SEPT 2020--NO CHANGES, NO SIGNIFICANT OR worrisome lesions per radiologist, chronic lung changes noted, consider follow-up again in one year    Type 2 diabetes --cont , Lantus 50 units daily, ozempic 1mg q weekly   Hypertension -continues-lisinopril 20 mg daily,   Elevated cholesterol-, continues Lipitor 10 mg daily---  Gastroesophageal reflux --- continues Protonix 40 mg daily  Restless leg syndrome 10 years  Mirapex 0.5 daily at bedtime  Allergies,--- continue Zyrtec 10 mg daily  Previous left total knee arthroplasty, right ankle fusion foot fusion  Depression, PTSD by history ---- off bupropion and Cymbalta,  [facility]  ed on prn sildenafil    Follow Up   Return in about 6 months (around 3/29/2022).  Patient was given instructions and counseling regarding his condition or for health maintenance advice. Please see specific  information pulled into the AVS if appropriate.

## 2021-09-29 ENCOUNTER — OFFICE VISIT (OUTPATIENT)
Dept: INTERNAL MEDICINE | Facility: CLINIC | Age: 76
End: 2021-09-29

## 2021-09-29 VITALS
RESPIRATION RATE: 18 BRPM | BODY MASS INDEX: 29.49 KG/M2 | HEART RATE: 75 BPM | HEIGHT: 70 IN | SYSTOLIC BLOOD PRESSURE: 125 MMHG | TEMPERATURE: 99.1 F | DIASTOLIC BLOOD PRESSURE: 76 MMHG | WEIGHT: 206 LBS | OXYGEN SATURATION: 96 %

## 2021-09-29 DIAGNOSIS — I10 HYPERTENSION, ESSENTIAL: ICD-10-CM

## 2021-09-29 DIAGNOSIS — M54.50 ACUTE RIGHT-SIDED LOW BACK PAIN WITHOUT SCIATICA: Primary | ICD-10-CM

## 2021-09-29 DIAGNOSIS — Z12.5 SCREENING PSA (PROSTATE SPECIFIC ANTIGEN): ICD-10-CM

## 2021-09-29 DIAGNOSIS — J30.1 ALLERGIC RHINITIS DUE TO POLLEN, UNSPECIFIED SEASONALITY: ICD-10-CM

## 2021-09-29 DIAGNOSIS — E13.9 DIABETES 1.5, MANAGED AS TYPE 2 (HCC): ICD-10-CM

## 2021-09-29 DIAGNOSIS — K21.9 GASTROESOPHAGEAL REFLUX DISEASE WITHOUT ESOPHAGITIS: ICD-10-CM

## 2021-09-29 DIAGNOSIS — G25.81 RLS (RESTLESS LEGS SYNDROME): ICD-10-CM

## 2021-09-29 DIAGNOSIS — E78.2 MIXED HYPERLIPIDEMIA: ICD-10-CM

## 2021-09-29 PROCEDURE — 99213 OFFICE O/P EST LOW 20 MIN: CPT | Performed by: INTERNAL MEDICINE

## 2022-03-23 ENCOUNTER — OFFICE VISIT (OUTPATIENT)
Dept: INTERNAL MEDICINE | Facility: CLINIC | Age: 77
End: 2022-03-23

## 2022-03-23 VITALS
TEMPERATURE: 98.1 F | HEART RATE: 69 BPM | OXYGEN SATURATION: 96 % | DIASTOLIC BLOOD PRESSURE: 83 MMHG | BODY MASS INDEX: 29.55 KG/M2 | HEIGHT: 70 IN | WEIGHT: 206.4 LBS | SYSTOLIC BLOOD PRESSURE: 133 MMHG

## 2022-03-23 DIAGNOSIS — Z12.5 SCREENING PSA (PROSTATE SPECIFIC ANTIGEN): Primary | ICD-10-CM

## 2022-03-23 DIAGNOSIS — Z79.4 TYPE 2 DIABETES MELLITUS WITH DIABETIC NEUROPATHY, WITH LONG-TERM CURRENT USE OF INSULIN: ICD-10-CM

## 2022-03-23 DIAGNOSIS — J30.1 ALLERGIC RHINITIS DUE TO POLLEN, UNSPECIFIED SEASONALITY: ICD-10-CM

## 2022-03-23 DIAGNOSIS — G25.81 RLS (RESTLESS LEGS SYNDROME): ICD-10-CM

## 2022-03-23 DIAGNOSIS — K21.9 GASTROESOPHAGEAL REFLUX DISEASE WITHOUT ESOPHAGITIS: ICD-10-CM

## 2022-03-23 DIAGNOSIS — E11.40 TYPE 2 DIABETES MELLITUS WITH DIABETIC NEUROPATHY, WITH LONG-TERM CURRENT USE OF INSULIN: ICD-10-CM

## 2022-03-23 DIAGNOSIS — E78.2 MIXED HYPERLIPIDEMIA: ICD-10-CM

## 2022-03-23 DIAGNOSIS — I10 HYPERTENSION, ESSENTIAL: ICD-10-CM

## 2022-03-23 PROCEDURE — 99214 OFFICE O/P EST MOD 30 MIN: CPT | Performed by: INTERNAL MEDICINE

## 2022-03-23 NOTE — PROGRESS NOTES
"Chief Complaint/ HPI: --- Patient is concerned about previous stress testing and wanting to know whether he should have another one, he is also thinking he needs another colonoscopy, here to follow-up with blood sugars diabetes and blood pressure, cholesterol denies chest pains, stays pretty active most of the time goes up and down steps during the day at home,          Objective   Vital Signs  Vitals:    03/23/22 1305   BP: 133/83   Pulse: 69   Temp: 98.1 °F (36.7 °C)   SpO2: 96%   Weight: 93.6 kg (206 lb 6.4 oz)   Height: 177.8 cm (70\")      Body mass index is 29.62 kg/m².  Review of Systems   Constitutional: Negative.    HENT: Negative.    Eyes: Negative.    Respiratory: Negative.    Cardiovascular: Negative.    Gastrointestinal: Negative.    Endocrine: Negative.    Genitourinary: Negative.    Musculoskeletal: Negative.    Allergic/Immunologic: Negative.    Neurological: Negative.    Hematological: Negative.    Psychiatric/Behavioral: Negative.       Physical Exam  Constitutional:       General: He is not in acute distress.     Appearance: Normal appearance.   HENT:      Head: Normocephalic.      Mouth/Throat:      Mouth: Mucous membranes are moist.   Eyes:      Conjunctiva/sclera: Conjunctivae normal.      Pupils: Pupils are equal, round, and reactive to light.   Cardiovascular:      Rate and Rhythm: Normal rate and regular rhythm.      Pulses: Normal pulses.      Heart sounds: Normal heart sounds.   Pulmonary:      Effort: Pulmonary effort is normal.      Breath sounds: Normal breath sounds.   Abdominal:      General: Abdomen is flat. Bowel sounds are normal.      Palpations: Abdomen is soft.   Musculoskeletal:         General: No swelling. Normal range of motion.      Cervical back: Neck supple.   Skin:     General: Skin is warm and dry.      Coloration: Skin is not jaundiced.   Neurological:      General: No focal deficit present.      Mental Status: He is alert and oriented to person, place, and time. Mental " status is at baseline.   Psychiatric:         Mood and Affect: Mood normal.         Behavior: Behavior normal.         Thought Content: Thought content normal.         Judgment: Judgment normal.        Result Review :   No results found for: PROBNP, BNP          Lab Results   Component Value Date    TSH 2.300 08/07/2019      Lab Results   Component Value Date    FREET4 1.1 08/07/2019                          Visit Diagnoses:    ICD-10-CM ICD-9-CM   1. Screening PSA (prostate specific antigen)  Z12.5 V76.44   2. Hypertension, essential  I10 401.9   3. RLS (restless legs syndrome)  G25.81 333.94   4. Mixed hyperlipidemia  E78.2 272.2   5. Gastroesophageal reflux disease without esophagitis  K21.9 530.81   6. Allergic rhinitis due to pollen, unspecified seasonality  J30.1 477.0   7. Type 2 diabetes mellitus with diabetic neuropathy, with long-term current use of insulin ([facility])  E11.40 250.60    Z79.4 357.2     V58.67       Assessment and Plan   Diagnoses and all orders for this visit:    1. Screening PSA (prostate specific antigen) (Primary)  -     Comprehensive Metabolic Panel; Future  -     Hemoglobin A1c; Future  -     CBC & Differential; Future  -     PSA Screen; Future  -     TSH+Free T4; Future  -     Ambulatory Referral to Cardiology  -     Ambulatory Referral to Gastroenterology    2. Hypertension, essential  -     Comprehensive Metabolic Panel; Future  -     Hemoglobin A1c; Future  -     CBC & Differential; Future  -     PSA Screen; Future  -     TSH+Free T4; Future  -     Ambulatory Referral to Cardiology  -     Ambulatory Referral to Gastroenterology    3. RLS (restless legs syndrome)  -     Comprehensive Metabolic Panel; Future  -     Hemoglobin A1c; Future  -     CBC & Differential; Future  -     PSA Screen; Future  -     TSH+Free T4; Future  -     Ambulatory Referral to Cardiology  -     Ambulatory Referral to Gastroenterology    4. Mixed hyperlipidemia  -     Comprehensive Metabolic Panel; Future  -      Hemoglobin A1c; Future  -     CBC & Differential; Future  -     PSA Screen; Future  -     TSH+Free T4; Future  -     Ambulatory Referral to Cardiology  -     Ambulatory Referral to Gastroenterology    5. Gastroesophageal reflux disease without esophagitis  -     Comprehensive Metabolic Panel; Future  -     Hemoglobin A1c; Future  -     CBC & Differential; Future  -     PSA Screen; Future  -     TSH+Free T4; Future  -     Ambulatory Referral to Cardiology  -     Ambulatory Referral to Gastroenterology    6. Allergic rhinitis due to pollen, unspecified seasonality  -     Comprehensive Metabolic Panel; Future  -     Hemoglobin A1c; Future  -     CBC & Differential; Future  -     PSA Screen; Future  -     TSH+Free T4; Future  -     Ambulatory Referral to Cardiology  -     Ambulatory Referral to Gastroenterology    7. Type 2 diabetes mellitus with diabetic neuropathy, with long-term current use of insulin (HCC)  -     Comprehensive Metabolic Panel; Future  -     Hemoglobin A1c; Future  -     CBC & Differential; Future  -     PSA Screen; Future  -     TSH+Free T4; Future  -     Ambulatory Referral to Cardiology  -     Ambulatory Referral to Gastroenterology    Will set up patient for screening colonoscopy, Dr. Shepard,, Dr. Puente, March 2022    Cardiac issues of patient's concern, consider stress testing cardiology follow-up, will make appointment with Dr. Harrison, or monitoring March 2022    abd pain , wgt loss, back pain, r/o pancreatic isssues, pud , PT HAD CT scan abdomen and pelvis August 2019,---did not show any evidence of pancreatitis or intra-abdominal pathology, ------S/P EGD WITH DR OLSON OCT 2019, -he has some small nodular areas in both lung bases and some interstitial changes -- CT scan of the chest March 2020,  reticular-nodular pattern in the lung bases, NO specific infiltrates or groundglass opacities are noted, F/U CT CHEST SEPT 2020--NO CHANGES, NO SIGNIFICANT OR worrisome lesions per radiologist,  chronic lung changes noted,     Mild anemia hemoglobin 13.1 September 2020    Type 2 diabetes --cont , Lantus 50 units daily, ozempic 1mg q weekly     Hypertension -continues-lisinopril 20 mg daily, Maxide 25 mg half a tablet daily     Elevated cholesterol-, continues Lipitor 10 mg daily---    Gastroesophageal reflux --- continues Protonix 40 mg daily    Restless leg syndrome 10 years--- continues Mirapex,     allergies,--- continue Zyrtec 10 mg daily    Previous left total knee arthroplasty, right ankle fusion foot fusion    Depression, PTSD by history ----     [facility]    ed on prn sildenafil          Follow Up   Return in about 6 months (around 9/23/2022).  Patient was given instructions and counseling regarding his condition or for health maintenance advice. Please see specific information pulled into the AVS if appropriate.

## 2022-03-30 ENCOUNTER — OFFICE VISIT (OUTPATIENT)
Dept: CARDIOLOGY | Facility: CLINIC | Age: 77
End: 2022-03-30

## 2022-03-30 VITALS
WEIGHT: 210 LBS | HEIGHT: 70 IN | HEART RATE: 64 BPM | SYSTOLIC BLOOD PRESSURE: 123 MMHG | BODY MASS INDEX: 30.06 KG/M2 | DIASTOLIC BLOOD PRESSURE: 76 MMHG

## 2022-03-30 DIAGNOSIS — I10 HYPERTENSION, ESSENTIAL: ICD-10-CM

## 2022-03-30 DIAGNOSIS — I25.10 MILD CAD: ICD-10-CM

## 2022-03-30 DIAGNOSIS — R06.09 DYSPNEA ON EXERTION: Primary | ICD-10-CM

## 2022-03-30 DIAGNOSIS — E78.2 HYPERLIPEMIA, MIXED: ICD-10-CM

## 2022-03-30 PROCEDURE — 99204 OFFICE O/P NEW MOD 45 MIN: CPT | Performed by: INTERNAL MEDICINE

## 2022-03-30 PROCEDURE — 93000 ELECTROCARDIOGRAM COMPLETE: CPT | Performed by: INTERNAL MEDICINE

## 2022-03-30 NOTE — PROGRESS NOTES
Chief Complaint  Shortness of Breath, Hypertension, and Hyperlipidemia    Subjective            Sajan Hermosillo presents to [facility] CARDIOLOGY  History of Present Illness    77-year-old white male.  He has hypertension, dyslipidemia, diabetes.  He had a cardiac catheterization in 2014 which showed mild coronary artery disease.  Reevaluation has been requested by the patient.  He stays relatively active.  He has mild dyspnea on exertion and over the years has less energy.  He denies chest pain.  No palpitations.  He is compliant with his medications.  He is a non-smoker.    PMH  Past Medical History:   Diagnosis Date   • Abdominal pain    • Ankle pain    • Arthritis    • Fracture    • Heel pain    • High cholesterol    • HTN (hypertension)    • Hyperlipemia    • Ingrowing toenail    • Limb swelling    • Prostate disorder    • Seasonal allergies    • Type 2 diabetes mellitus (HCC)          SURGICALHX  Past Surgical History:   Procedure Laterality Date   • COLONOSCOPY     • ENDOSCOPY     • JOINT REPLACEMENT     • OTHER SURGICAL HISTORY      Artificial joints/limbs   • OTHER SURGICAL HISTORY      Metal Implants        SOC  Social History     Socioeconomic History   • Marital status:    Tobacco Use   • Smoking status: Former Smoker     Types: Cigarettes     Quit date:      Years since quittin.2   • Smokeless tobacco: Former User   Vaping Use   • Vaping Use: Never used   Substance and Sexual Activity   • Alcohol use: Never   • Drug use: Never   • Sexual activity: Defer         FAMHX  Family History   Problem Relation Age of Onset   • Heart disease Mother    • Diabetes Mother         unspecified type   • Arthritis Mother    • Arthritis Father    • Heart disease Sister    • Diabetes Sister         unspecified type   • Heart disease Brother    • Arthritis Brother           ALLERGY  Allergies   Allergen Reactions   • Hydrocodone Rash   • Hydrocodone-Acetaminophen Rash   •  Hydrocodone-Ibuprofen Rash   • Oxycodone Rash   • Penicillins Rash        MEDSCURRENT    Current Outpatient Medications:   •  aspirin 81 MG EC tablet, Aspir-81 81 mg oral tablet,delayed release (DR/EC) take 1 tablet (81 mg) by oral route once daily   Active, Disp: , Rfl:   •  atorvastatin (LIPITOR) 40 MG tablet, atorvastatin 40 mg oral tablet take 1 tablet (40 mg) by oral route once daily   Active, Disp: , Rfl:   •  carboxymethylcellulose (REFRESH PLUS) 0.5 % solution, 1 drop., Disp: , Rfl:   •  cetirizine (zyrTEC) 10 MG tablet, cetirizine 10 mg oral tablet take 1 tablet (10 mg) by oral route once daily   Active, Disp: , Rfl:   •  citalopram (CeleXA) 20 MG tablet, citalopram 20 mg oral tablet take 1 tablet (20 mg) by oral route once daily   Active, Disp: , Rfl:   •  esomeprazole (nexIUM) 40 MG capsule, Nexium 40 mg oral capsule,delayed release(DR/EC) take 1 capsule (40 mg) by oral route once daily   Active, Disp: , Rfl:   •  fluticasone (FLONASE) 50 MCG/ACT nasal spray, fluticasone 50 mcg/actuation nasal spray,suspension spray 1 - 2 sprays (50 - 100 mcg) in each nostril by intranasal route once daily as needed   Active, Disp: , Rfl:   •  insulin glargine (Lantus) 100 UNIT/ML injection, Lantus 100 unit/mL subcutaneous solution inject by subcutaneous route as per insulin protocol   Active, Disp: , Rfl:   •  ketoconazole (NIZORAL) 2 % cream, ketoconazole 2 % topical cream apply to the affected area(s) by topical route once daily   Suspended, Disp: , Rfl:   •  lisinopril (PRINIVIL,ZESTRIL) 20 MG tablet, lisinopril 20 mg oral tablet take 1 tablet (20 mg) by oral route once daily   Active, Disp: , Rfl:   •  metFORMIN (GLUCOPHAGE) 1000 MG tablet, metformin 1,000 mg oral tablet take 1 tablet (1,000 mg) by oral route 2 times per day with morning and evening meals   Active, Disp: , Rfl:   •  metoprolol succinate XL (TOPROL-XL) 50 MG 24 hr tablet, Toprol XL 50 mg oral tablet extended release 24 hr take 1 tablet (50 mg) by oral  "route once daily   Active, Disp: , Rfl:   •  nitroglycerin (NITROSTAT) 0.4 MG SL tablet, nitroglycerin 0.4 mg sublingual tablet, sublingual place 1 tablet (0.4 mg) by buccal route at the first sign of an attack; no more than 3 tabs are recommended within a 15 minute period.   Active, Disp: , Rfl:   •  Semaglutide, 1 MG/DOSE, (OZEMPIC) 2 MG/1.5ML solution pen-injector, Inject  under the skin into the appropriate area as directed 1 (One) Time Per Week., Disp: , Rfl:   •  montelukast (SINGULAIR) 10 MG tablet, montelukast 10 mg oral tablet take 1 tablet (10 mg) by oral route once daily in the evening   Active, Disp: , Rfl:       Review of Systems   Constitutional: Negative.   HENT: Negative.    Eyes: Negative.    Cardiovascular: Positive for dyspnea on exertion. Negative for chest pain.   Respiratory: Positive for shortness of breath.    Endocrine: Negative.    Hematologic/Lymphatic: Negative.    Skin: Negative.    Musculoskeletal: Negative.    Gastrointestinal: Negative.    Genitourinary: Negative.    Neurological: Negative.    Psychiatric/Behavioral: Negative.         Objective     /76   Pulse 64   Ht 177.8 cm (70\")   Wt 95.3 kg (210 lb)   BMI 30.13 kg/m²       General Appearance:   · well developed  · well nourished  HENT:   · oropharynx moist  · lips not cyanotic  Neck:  · thyroid not enlarged  · supple  Respiratory:  · no respiratory distress  · normal breath sounds  · no rales  Cardiovascular:  · no jugular venous distention  · regular rhythm  · apical impulse normal  · S1 normal, S2 normal  · no S3, no S4   · no murmur  · no rub, no thrill  · carotid pulses normal; no bruit  · pedal pulses normal  · lower extremity edema: none    Musculoskeletal:  · no clubbing of fingers.   · normocephalic, head atraumatic  Skin:   · warm, dry  Psychiatric:  · judgement and insight appropriate  · normal mood and affect      Result Review :     The following data was reviewed by: Angel Harrison MD on " 03/30/2022:                      Data reviewed: Primary care records reviewed, most recent LDL was 50, I reviewed his cardiac catheterization printed films from 2014, this showed mild plaquing in the proximal LAD, diffuse luminal irregularities in the circumflex and mild proximal RCA disease       ECG 12 Lead    Date/Time: 3/30/2022 8:34 AM  Performed by: ANJANA Harrison MD  Authorized by: ANJANA Harrison MD   Previous ECG: no previous ECG available  Rhythm: sinus rhythm  Conduction: conduction normal  ST Segments: ST segments normal  T Waves: T waves normal  QRS axis: normal  Other: no other findings    Clinical impression: normal ECG                   Assessment and Plan        ASSESSMENT:  Encounter Diagnoses   Name Primary?   • Dyspnea on exertion Yes   • Mild CAD    • Hypertension, essential    • Hyperlipemia, mixed          PLAN:    1.  Mr. Hermosillo has mild dyspnea on exertion.  He has known coronary artery disease which was assessed by catheterization in 2014.  Stress imaging will be scheduled to reevaluate for ischemic changes.  Continue aspirin prophylaxis  2.  Hypertension controlled, continue current medical therapy  3.  Dyslipidemia controlled, continue statin therapy    We will discussed diagnostic results when available otherwise the patient will be followed annually          Patient was given instructions and counseling regarding his condition or for health maintenance advice. Please see specific information pulled into the AVS if appropriate.             ANJANA Harrison MD  3/30/2022    08:33 EDT

## 2022-05-03 ENCOUNTER — HOSPITAL ENCOUNTER (OUTPATIENT)
Dept: NUCLEAR MEDICINE | Facility: HOSPITAL | Age: 77
Discharge: HOME | End: 2022-05-03

## 2022-05-03 DIAGNOSIS — R06.09 DYSPNEA ON EXERTION: ICD-10-CM

## 2022-05-03 PROCEDURE — 78452 HT MUSCLE IMAGE SPECT MULT: CPT

## 2022-05-03 PROCEDURE — 0 TECHNETIUM TETROFOSMIN KIT: Performed by: INTERNAL MEDICINE

## 2022-05-03 PROCEDURE — 78452 HT MUSCLE IMAGE SPECT MULT: CPT | Performed by: INTERNAL MEDICINE

## 2022-05-03 PROCEDURE — 93018 CV STRESS TEST I&R ONLY: CPT | Performed by: INTERNAL MEDICINE

## 2022-05-03 PROCEDURE — A9502 TC99M TETROFOSMIN: HCPCS | Performed by: INTERNAL MEDICINE

## 2022-05-03 PROCEDURE — 93017 CV STRESS TEST TRACING ONLY: CPT

## 2022-05-03 RX ADMIN — TETROFOSMIN 1 DOSE: 1.38 INJECTION, POWDER, LYOPHILIZED, FOR SOLUTION INTRAVENOUS at 08:35

## 2022-05-03 RX ADMIN — TETROFOSMIN 1 DOSE: 1.38 INJECTION, POWDER, LYOPHILIZED, FOR SOLUTION INTRAVENOUS at 10:15

## 2022-05-04 ENCOUNTER — TELEPHONE (OUTPATIENT)
Dept: CARDIOLOGY | Facility: CLINIC | Age: 77
End: 2022-05-04

## 2022-05-04 LAB
BH CV IMMEDIATE POST RECOVERY TECH DATA SYMPTOMS: NORMAL
BH CV IMMEDIATE POST TECH DATA BLOOD PRESSURE: NORMAL MMHG
BH CV IMMEDIATE POST TECH DATA HEART RATE: 98 BPM
BH CV IMMEDIATE POST TECH DATA OXYGEN SATS: 98 %
BH CV REST NUCLEAR ISOTOPE DOSE: 9.8 MCI
BH CV SIX MINUTE RECOVERY TECH DATA BLOOD PRESSURE: NORMAL
BH CV SIX MINUTE RECOVERY TECH DATA HEART RATE: 87 BPM
BH CV SIX MINUTE RECOVERY TECH DATA OXYGEN SATURATION: 98 %
BH CV STRESS BP STAGE 1: NORMAL
BH CV STRESS BP STAGE 2: NORMAL
BH CV STRESS DURATION MIN STAGE 1: 3
BH CV STRESS DURATION MIN STAGE 2: 3
BH CV STRESS DURATION SEC STAGE 1: 0
BH CV STRESS DURATION SEC STAGE 2: 0
BH CV STRESS GRADE STAGE 1: 10
BH CV STRESS GRADE STAGE 2: 12
BH CV STRESS HR STAGE 1: 121
BH CV STRESS HR STAGE 2: 126
BH CV STRESS METS STAGE 1: 5
BH CV STRESS METS STAGE 2: 7.5
BH CV STRESS NUCLEAR ISOTOPE DOSE: 37.9 MCI
BH CV STRESS O2 STAGE 1: 98
BH CV STRESS O2 STAGE 2: 97
BH CV STRESS PROTOCOL 1: NORMAL
BH CV STRESS RECOVERY BP: NORMAL MMHG
BH CV STRESS RECOVERY HR: 85 BPM
BH CV STRESS RECOVERY O2: 98 %
BH CV STRESS SPEED STAGE 1: 1.7
BH CV STRESS SPEED STAGE 2: 2.5
BH CV STRESS STAGE 1: 1
BH CV STRESS STAGE 2: 2
BH CV THREE MINUTE POST TECH DATA BLOOD PRESSURE: NORMAL MMHG
BH CV THREE MINUTE POST TECH DATA HEART RATE: 98 BPM
BH CV THREE MINUTE POST TECH DATA OXYGEN SATURATION: 98 %
BH CV THREE MINUTE RECOVERY TECH DATA SYMPTOM: NORMAL
LV EF NUC BP: 59 %
MAXIMAL PREDICTED HEART RATE: 145 BPM
PERCENT MAX PREDICTED HR: 86.9 %
STRESS BASELINE BP: NORMAL MMHG
STRESS BASELINE HR: 59 BPM
STRESS O2 SAT REST: 96 %
STRESS PERCENT HR: 102 %
STRESS POST ESTIMATED WORKLOAD: 4.7 METS
STRESS POST EXERCISE DUR MIN: 5 MIN
STRESS POST EXERCISE DUR SEC: 30 SEC
STRESS POST O2 SAT PEAK: 98 %
STRESS POST PEAK BP: NORMAL MMHG
STRESS POST PEAK HR: 126 BPM
STRESS TARGET HR: 123 BPM

## 2022-05-04 NOTE — TELEPHONE ENCOUNTER
----- Message from Jennifer Cerna RN sent at 5/4/2022  8:58 AM EDT -----    ----- Message -----  From: ANJANA Harrison MD  Sent: 5/4/2022   8:33 AM EDT  To: Jennifer Cerna RN    SPECT showed normal cardiac blood flow, no evidence of blocked arteries    Plan is annual follow-up as scheduled, continue current medical therapy

## 2022-05-31 ENCOUNTER — CLINICAL SUPPORT (OUTPATIENT)
Dept: GASTROENTEROLOGY | Facility: CLINIC | Age: 77
End: 2022-05-31

## 2022-05-31 ENCOUNTER — PREP FOR SURGERY (OUTPATIENT)
Dept: OTHER | Facility: HOSPITAL | Age: 77
End: 2022-05-31

## 2022-05-31 DIAGNOSIS — Z12.11 COLON CANCER SCREENING: Primary | ICD-10-CM

## 2022-05-31 DIAGNOSIS — K44.9 HIATAL HERNIA: ICD-10-CM

## 2022-05-31 RX ORDER — POLYETHYLENE GLYCOL 3350, SODIUM SULFATE, SODIUM CHLORIDE, POTASSIUM CHLORIDE, ASCORBIC ACID, SODIUM ASCORBATE 140-9-5.2G
140 KIT ORAL TAKE AS DIRECTED
Qty: 1 EACH | Refills: 0 | Status: SHIPPED | OUTPATIENT
Start: 2022-05-31 | End: 2022-08-17 | Stop reason: SDUPTHER

## 2022-05-31 RX ORDER — METRONIDAZOLE 10 MG/G
GEL TOPICAL
COMMUNITY
Start: 2022-05-02

## 2022-05-31 NOTE — PROGRESS NOTES
Sajan Hermosillo     REASON FOR CALL-colonoscopy/EGD, hiatal hernia/colon screening/ last scopes on 2019 with Dr. Gomes     SENT IN [facility]  DOS-2022  Past Medical History:   Diagnosis Date   • Abdominal pain    • Ankle pain    • Arthritis    • Fracture    • Heel pain    • High cholesterol    • HTN (hypertension)    • Hyperlipemia    • Ingrowing toenail    • Limb swelling    • Prostate disorder    • Seasonal allergies    • Type 2 diabetes mellitus (HCC)      Allergies   Allergen Reactions   • Hydrocodone Rash   • Hydrocodone-Acetaminophen Rash   • Hydrocodone-Ibuprofen Rash   • Oxycodone Rash   • Penicillins Rash     Past Surgical History:   Procedure Laterality Date   • COLONOSCOPY      Dr. Gomes   • ENDOSCOPY  2019    Dr. Gomes   • FOOT SURGERY Right    • JOINT REPLACEMENT     • OTHER SURGICAL HISTORY      Artificial joints/limbs   • OTHER SURGICAL HISTORY      Metal Implants     Social History     Socioeconomic History   • Marital status:    Tobacco Use   • Smoking status: Former Smoker     Types: Cigarettes     Quit date:      Years since quittin.4   • Smokeless tobacco: Former User   Vaping Use   • Vaping Use: Never used   Substance and Sexual Activity   • Alcohol use: Never   • Drug use: Never   • Sexual activity: Defer     Family History   Problem Relation Age of Onset   • Heart disease Mother    • Diabetes Mother         unspecified type   • Arthritis Mother    • Heart disease Father    • Arthritis Father    • Heart disease Sister    • Diabetes Sister         unspecified type   • Heart disease Brother    • Arthritis Brother    • Colon cancer Neg Hx        Current Outpatient Medications:   •  aspirin 81 MG EC tablet, Aspir-81 81 mg oral tablet,delayed release (DR/EC) take 1 tablet (81 mg) by oral route once daily   Active, Disp: , Rfl:   •  atorvastatin (LIPITOR) 40 MG tablet, atorvastatin 40 mg oral tablet take 1 tablet (40 mg) by oral route once daily   Active, Disp: ,  Rfl:   •  carboxymethylcellulose (REFRESH PLUS) 0.5 % solution, 1 drop., Disp: , Rfl:   •  cetirizine (zyrTEC) 10 MG tablet, cetirizine 10 mg oral tablet take 1 tablet (10 mg) by oral route once daily   Active, Disp: , Rfl:   •  citalopram (CeleXA) 20 MG tablet, citalopram 20 mg oral tablet take 1 tablet (20 mg) by oral route once daily   Active, Disp: , Rfl:   •  esomeprazole (nexIUM) 40 MG capsule, Nexium 40 mg oral capsule,delayed release(DR/EC) take 1 capsule (40 mg) by oral route once daily   Active, Disp: , Rfl:   •  fluticasone (FLONASE) 50 MCG/ACT nasal spray, fluticasone 50 mcg/actuation nasal spray,suspension spray 1 - 2 sprays (50 - 100 mcg) in each nostril by intranasal route once daily as needed   Active, Disp: , Rfl:   •  insulin glargine (Lantus) 100 UNIT/ML injection, Lantus 100 unit/mL subcutaneous solution inject by subcutaneous route as per insulin protocol   Active, Disp: , Rfl:   •  ketoconazole (NIZORAL) 2 % cream, ketoconazole 2 % topical cream apply to the affected area(s) by topical route once daily   Suspended, Disp: , Rfl:   •  lisinopril (PRINIVIL,ZESTRIL) 20 MG tablet, lisinopril 20 mg oral tablet take 1 tablet (20 mg) by oral route once daily   Active, Disp: , Rfl:   •  metoprolol succinate XL (TOPROL-XL) 50 MG 24 hr tablet, Toprol XL 50 mg oral tablet extended release 24 hr take 1 tablet (50 mg) by oral route once daily   Active, Disp: , Rfl:   •  metroNIDAZOLE (METROGEL) 1 % gel, , Disp: , Rfl:   •  montelukast (SINGULAIR) 10 MG tablet, montelukast 10 mg oral tablet take 1 tablet (10 mg) by oral route once daily in the evening   Active, Disp: , Rfl:   •  nitroglycerin (NITROSTAT) 0.4 MG SL tablet, nitroglycerin 0.4 mg sublingual tablet, sublingual place 1 tablet (0.4 mg) by buccal route at the first sign of an attack; no more than 3 tabs are recommended within a 15 minute period.   Active, Disp: , Rfl:   •  Semaglutide, 1 MG/DOSE, (OZEMPIC) 2 MG/1.5ML solution pen-injector, Inject   under the skin into the appropriate area as directed 1 (One) Time Per Week., Disp: , Rfl:   •  metFORMIN (GLUCOPHAGE) 1000 MG tablet, metformin 1,000 mg oral tablet take 1 tablet (1,000 mg) by oral route 2 times per day with morning and evening meals   Active, Disp: , Rfl:

## 2022-08-05 ENCOUNTER — OFFICE VISIT (OUTPATIENT)
Dept: PODIATRY | Facility: CLINIC | Age: 77
End: 2022-08-05

## 2022-08-05 VITALS
TEMPERATURE: 97.5 F | HEIGHT: 70 IN | SYSTOLIC BLOOD PRESSURE: 117 MMHG | DIASTOLIC BLOOD PRESSURE: 60 MMHG | BODY MASS INDEX: 28.63 KG/M2 | WEIGHT: 200 LBS | OXYGEN SATURATION: 99 % | HEART RATE: 65 BPM

## 2022-08-05 DIAGNOSIS — E11.9 NON-INSULIN DEPENDENT TYPE 2 DIABETES MELLITUS: ICD-10-CM

## 2022-08-05 DIAGNOSIS — E11.8 DIABETIC FOOT: Primary | ICD-10-CM

## 2022-08-05 PROCEDURE — G8404 LOW EXTEMITY NEUR EXAM DOCUM: HCPCS | Performed by: PODIATRIST

## 2022-08-05 PROCEDURE — 99213 OFFICE O/P EST LOW 20 MIN: CPT | Performed by: PODIATRIST

## 2022-08-05 NOTE — PROGRESS NOTES
[facility] - PODIATRY    Today's Date: 08/05/22    Patient Name: Sajan Hermosillo  MRN: [id number]  CSN: [id number]  PCP: Juarez Arrington MD, Last PCP Visit:  3/23/2022  Referring Provider: No ref. provider found    SUBJECTIVE     Chief Complaint   Patient presents with   • Left Foot - Annual Exam, Diabetes   • Right Foot - Annual Exam, Diabetes     HPI: Sajan Hermosillo, a 77 y.o.male, comes presents to clinic for a diabetic foot evaluation.    New, Established, New Problem: established    Location: Bilateral feet    Duration: 2013    Onset: Insidious    Nature: NIDDM    Stable, worsening, improving: Stable    Patient controlling diabetes via: Oral medications    Patient states their most recent blood glucose reading was 99.    Patient denies any fevers, chills, nausea, vomiting, shortness of breathe, nor any other constitutional signs nor symptoms.    Medical changes:  none.    Past Medical History:   Diagnosis Date   • Abdominal pain    • Ankle pain    • Arthritis    • Fracture    • Heel pain    • High cholesterol    • HTN (hypertension)    • Hyperlipemia    • Ingrowing toenail    • Limb swelling    • Prostate disorder    • Seasonal allergies    • Type 2 diabetes mellitus (HCC)      Past Surgical History:   Procedure Laterality Date   • COLONOSCOPY  2019    Dr. Gomes   • ENDOSCOPY  2019    Dr. Gomes   • FOOT SURGERY Right    • JOINT REPLACEMENT     • OTHER SURGICAL HISTORY      Artificial joints/limbs   • OTHER SURGICAL HISTORY      Metal Implants     Family History   Problem Relation Age of Onset   • Heart disease Mother    • Diabetes Mother         unspecified type   • Arthritis Mother    • Heart disease Father    • Arthritis Father    • Heart disease Sister    • Diabetes Sister         unspecified type   • Heart disease Brother    • Arthritis Brother    • Colon cancer Neg Hx      Social History     Socioeconomic History   • Marital status:    Tobacco Use   • Smoking status:  Former Smoker     Types: Cigarettes     Quit date:      Years since quittin.6   • Smokeless tobacco: Former User   Vaping Use   • Vaping Use: Never used   Substance and Sexual Activity   • Alcohol use: Never   • Drug use: Never   • Sexual activity: Defer     Allergies   Allergen Reactions   • Hydrocodone Rash   • Hydrocodone-Acetaminophen Rash   • Hydrocodone-Ibuprofen Rash   • Oxycodone Rash   • Penicillins Rash     Current Outpatient Medications   Medication Sig Dispense Refill   • aspirin 81 MG EC tablet Aspir-81 81 mg oral tablet,delayed release (DR/EC) take 1 tablet (81 mg) by oral route once daily   Active     • atorvastatin (LIPITOR) 40 MG tablet atorvastatin 40 mg oral tablet take 1 tablet (40 mg) by oral route once daily   Active     • carboxymethylcellulose (REFRESH PLUS) 0.5 % solution 1 drop.     • cetirizine (zyrTEC) 10 MG tablet cetirizine 10 mg oral tablet take 1 tablet (10 mg) by oral route once daily   Active     • citalopram (CeleXA) 20 MG tablet citalopram 20 mg oral tablet take 1 tablet (20 mg) by oral route once daily   Active     • esomeprazole (nexIUM) 40 MG capsule Nexium 40 mg oral capsule,delayed release(DR/EC) take 1 capsule (40 mg) by oral route once daily   Active     • fluticasone (FLONASE) 50 MCG/ACT nasal spray fluticasone 50 mcg/actuation nasal spray,suspension spray 1 - 2 sprays (50 - 100 mcg) in each nostril by intranasal route once daily as needed   Active     • insulin glargine (Lantus) 100 UNIT/ML injection Lantus 100 unit/mL subcutaneous solution inject by subcutaneous route as per insulin protocol   Active     • ketoconazole (NIZORAL) 2 % cream ketoconazole 2 % topical cream apply to the affected area(s) by topical route once daily   Suspended     • lisinopril (PRINIVIL,ZESTRIL) 20 MG tablet lisinopril 20 mg oral tablet take 1 tablet (20 mg) by oral route once daily   Active     • metFORMIN (GLUCOPHAGE) 1000 MG tablet metformin 1,000 mg oral tablet take 1 tablet  (1,000 mg) by oral route 2 times per day with morning and evening meals   Active     • metoprolol succinate XL (TOPROL-XL) 50 MG 24 hr tablet Toprol XL 50 mg oral tablet extended release 24 hr take 1 tablet (50 mg) by oral route once daily   Active     • metroNIDAZOLE (METROGEL) 1 % gel      • montelukast (SINGULAIR) 10 MG tablet montelukast 10 mg oral tablet take 1 tablet (10 mg) by oral route once daily in the evening   Active     • nitroglycerin (NITROSTAT) 0.4 MG SL tablet nitroglycerin 0.4 mg sublingual tablet, sublingual place 1 tablet (0.4 mg) by buccal route at the first sign of an attack; no more than 3 tabs are recommended within a 15 minute period.   Active     • PEG-KCl-NaCl-NaSulf-Na Asc-C (Plenvu) 140 g reconstituted solution solution Take 140 g by mouth Take As Directed. 1 each 0   • Semaglutide, 1 MG/DOSE, (OZEMPIC) 2 MG/1.5ML solution pen-injector Inject  under the skin into the appropriate area as directed 1 (One) Time Per Week.       No current facility-administered medications for this visit.     Review of Systems   Constitutional: Negative.    All other systems reviewed and are negative.      OBJECTIVE     Vitals:    08/05/22 0852   BP: 117/60   Pulse: 65   Temp: 97.5 °F (36.4 °C)   SpO2: 99%       Body mass index is 28.7 kg/m².    Lab Results   Component Value Date    HGBA1C 6.2 (H) 09/28/2020       Lab Results   Component Value Date    GLUCOSE 94 09/28/2020    CALCIUM 9.6 09/28/2020     09/28/2020    K 4.8 09/28/2020    CO2 24 09/28/2020     09/28/2020    BUN 18 09/28/2020    CREATININE 1.22 (H) 09/28/2020    BCR 15 09/28/2020    ANIONGAP 15 09/28/2020       Patient seen in no apparent distress.      PHYSICAL EXAM:     Foot/Ankle Exam:       General:   Diabetic Foot Exam Performed    Appearance: appears stated age and healthy and elderly    Orientation: AAOx3    Affect: appropriate    Gait: unimpaired    Shoe Gear:  Casual shoes    VASCULAR      Right Foot Vascularity   Normal  vascular exam    Dorsalis pedis:  2+  Posterior tibial:  2+  Skin Temperature: warm    Edema Grading:  None  CFT:  < 3 seconds  Pedal Hair Growth:  Absent  Varicosities: none       Left Foot Vascularity   Normal vascular exam    Dorsalis pedis:  2+  Posterior tibial:  2+  Skin Temperature: warm    Edema Grading:  None  CFT:  < 3 seconds  Pedal Hair Growth:  Absent  Varicosities: none        NEUROLOGIC     Right Foot Neurologic   Normal sensation    Light touch sensation:  Normal  Vibratory sensation:  Normal  Hot/Cold sensation: normal    Protective Sensation using Grant-Krishna Monofilament:  10     Left Foot Neurologic   Normal sensation    Light touch sensation:  Normal  Vibratory sensation:  Normal  Hot/cold sensation: normal    Protective Sensation using Grant-Krishna Monofilament:  10     MUSCULOSKELETAL      Left Foot Musculoskeletal   Hallux limitus: Yes       MUSCLE STRENGTH     Right Foot Muscle Strength   Foot dorsiflexion:  4+  Foot plantar flexion:  4+  Foot inversion:  4+  Foot eversion:  4+     Left Foot Muscle Strength   Foot dorsiflexion:  4+  Foot plantar flexion:  4+  Foot inversion:  4+  Foot eversion:  4+     RANGE OF MOTION      Right Foot Range of Motion   Foot and ankle ROM within normal limits    Right ankle active dorsiflexion: No range of motion of Chopart's and subtalar joints consistent with past surgical history of fusions of these joints.     Left Foot Range of Motion   Foot and ankle ROM within normal limits       DERMATOLOGIC     Right Foot Dermatologic   Skin: skin intact    Nails: normal       Left Foot Dermatologic   Skin: skin intact    Nails: normal              ASSESSMENT/PLAN     Diagnoses and all orders for this visit:    1. Diabetic foot (HCC) (Primary)    2. Non-insulin dependent type 2 diabetes mellitus (HCC)        Comprehensive lower extremity examination and evaluation was performed.    Discussed findings and treatment plan including risks, benefits, and treatment  options with patient in detail. Patient agreed with treatment plan.    Diabetic foot exam performed and documented this date, compliant with CQM required standards. Detail of findings as noted in physical exam.  Lower extremity Neurologic exam for diabetic patient performed and documented this date, compliant with PQRS required standards. Detail of findings as noted in physical exam.  Advised patient importance of good routine lower extremity hygiene. Advised patient importance of evaluating for intact skin and pain free nail borders.  Advised patient to use mirror to evaluate plantar/ soles of feet for better visualization. Advised patient monitor and phone office to be seen if any cracking to skin, open lesions, painful nail borders or if nails become elongated prior to next visit. Advised patient importance of daily cleansing of lower extremities, followed by good skin cream to maintain normal hydration of skin. Also advised patient importance of close daily monitoring of blood sugar. Advised to regulate diet and medications to maintain control of blood sugar in optimal range. Contact primary care provider if difficulties maintaining blood sugar levels.  Advised Patient of presence of Diabetes Mellitus condition.  Advised Patient risk of progression and worsening or improvement, then return of condition.  Will monitor condition for any change in future. Treat with most appropriate treatment pending status of condition.  Counseled and advised patient extensively on nature and ramifications of diabetes. Standard instructions given to patient for good diabetic foot care and maintenance. Advised importance of careful monitoring to avoid break down and complications secondary to diabetes. Advised patient importance of strict maintenance of blood sugar control. Advised patient of possible ominous results from neglect of condition, i.e.: amputation/ loss of digits, feet and legs, or even death.  Patient states  understands counseling, will monitor closely, continue good hygiene and routine diabetic foot care. Patient will contact office is questions or problems.      An After Visit Summary was printed and given to the patient at discharge, including (if requested) any available informative/educational handouts regarding diagnosis, treatment, or medications. All questions were answered to patient/family satisfaction. Should symptoms fail to improve or worsen they agree to call or return to clinic or to go to the Emergency Department. Discussed the importance of following up with any needed screening tests/labs/specialist appointments and any requested follow-up recommended by me today. Importance of maintaining follow-up discussed and patient accepts that missed appointments can delay diagnosis and potentially lead to worsening of conditions.    Return in about 1 year (around 8/5/2023) for Podiatry Diabetic Foot Exam., or sooner if acute issues arise.    This document has been electronically signed by Jeff Gross DPM on August 5, 2022 09:03 EDT

## 2022-08-17 ENCOUNTER — TELEPHONE (OUTPATIENT)
Dept: GASTROENTEROLOGY | Facility: CLINIC | Age: 77
End: 2022-08-17

## 2022-08-17 RX ORDER — POLYETHYLENE GLYCOL 3350, SODIUM SULFATE, SODIUM CHLORIDE, POTASSIUM CHLORIDE, ASCORBIC ACID, SODIUM ASCORBATE 140-9-5.2G
140 KIT ORAL TAKE AS DIRECTED
Qty: 1 EACH | Refills: 0 | Status: ON HOLD | OUTPATIENT
Start: 2022-08-17 | End: 2022-08-22

## 2022-08-17 NOTE — TELEPHONE ENCOUNTER
I spoke with Mr Hermosillo, will resend prep to  on [address] as pt requested. He also ask I [facility] him [facility].   Voiced understanding. [email or web address]

## 2022-08-17 NOTE — TELEPHONE ENCOUNTER
PATIENT CALLED ABOUT WHEN AND WHERE HE COULD  HIS PREP FOR HIS PROCEDURE ON Monday. HE SAID [facility] DIDN'T HAVE THE PREP, I TOLD HIM WE HAVE [facility] ON FILE. BUT THAT I WOULD CHECK WITH CLINICAL ABOUT GETTING IT SENT TO [facility] FOR HIM.

## 2022-08-22 ENCOUNTER — ANESTHESIA (OUTPATIENT)
Dept: GASTROENTEROLOGY | Facility: HOSPITAL | Age: 77
End: 2022-08-22

## 2022-08-22 ENCOUNTER — HOSPITAL ENCOUNTER (OUTPATIENT)
Facility: HOSPITAL | Age: 77
Setting detail: HOSPITAL OUTPATIENT SURGERY
Discharge: HOME | End: 2022-08-22
Attending: INTERNAL MEDICINE | Admitting: INTERNAL MEDICINE

## 2022-08-22 ENCOUNTER — ANESTHESIA EVENT (OUTPATIENT)
Dept: GASTROENTEROLOGY | Facility: HOSPITAL | Age: 77
End: 2022-08-22

## 2022-08-22 VITALS
HEART RATE: 71 BPM | TEMPERATURE: 97 F | SYSTOLIC BLOOD PRESSURE: 112 MMHG | OXYGEN SATURATION: 98 % | BODY MASS INDEX: 28.79 KG/M2 | WEIGHT: 200.62 LBS | RESPIRATION RATE: 16 BRPM | DIASTOLIC BLOOD PRESSURE: 66 MMHG

## 2022-08-22 DIAGNOSIS — K44.9 HIATAL HERNIA: ICD-10-CM

## 2022-08-22 DIAGNOSIS — Z12.11 COLON CANCER SCREENING: ICD-10-CM

## 2022-08-22 LAB — GLUCOSE BLDC GLUCOMTR-MCNC: 98 MG/DL (ref 70–99)

## 2022-08-22 PROCEDURE — 88305 TISSUE EXAM BY PATHOLOGIST: CPT | Performed by: INTERNAL MEDICINE

## 2022-08-22 PROCEDURE — 82962 GLUCOSE BLOOD TEST: CPT

## 2022-08-22 PROCEDURE — 25010000002 PROPOFOL 10 MG/ML EMULSION: Performed by: NURSE ANESTHETIST, CERTIFIED REGISTERED

## 2022-08-22 PROCEDURE — G0105 COLORECTAL SCRN; HI RISK IND: HCPCS | Performed by: INTERNAL MEDICINE

## 2022-08-22 PROCEDURE — 43239 EGD BIOPSY SINGLE/MULTIPLE: CPT | Performed by: INTERNAL MEDICINE

## 2022-08-22 RX ORDER — LIDOCAINE HYDROCHLORIDE 20 MG/ML
INJECTION, SOLUTION EPIDURAL; INFILTRATION; INTRACAUDAL; PERINEURAL AS NEEDED
Status: DISCONTINUED | OUTPATIENT
Start: 2022-08-22 | End: 2022-08-22 | Stop reason: SURG

## 2022-08-22 RX ORDER — PROPOFOL 10 MG/ML
VIAL (ML) INTRAVENOUS AS NEEDED
Status: DISCONTINUED | OUTPATIENT
Start: 2022-08-22 | End: 2022-08-22 | Stop reason: SURG

## 2022-08-22 RX ORDER — SODIUM CHLORIDE, SODIUM LACTATE, POTASSIUM CHLORIDE, CALCIUM CHLORIDE 600; 310; 30; 20 MG/100ML; MG/100ML; MG/100ML; MG/100ML
30 INJECTION, SOLUTION INTRAVENOUS CONTINUOUS
Status: DISCONTINUED | OUTPATIENT
Start: 2022-08-22 | End: 2022-08-22 | Stop reason: HOSPADM

## 2022-08-22 RX ADMIN — LIDOCAINE HYDROCHLORIDE 40 MG: 20 INJECTION, SOLUTION EPIDURAL; INFILTRATION; INTRACAUDAL; PERINEURAL at 09:04

## 2022-08-22 RX ADMIN — SODIUM CHLORIDE, POTASSIUM CHLORIDE, SODIUM LACTATE AND CALCIUM CHLORIDE: 600; 310; 30; 20 INJECTION, SOLUTION INTRAVENOUS at 08:58

## 2022-08-22 RX ADMIN — PROPOFOL 200 MCG/KG/MIN: 10 INJECTION, EMULSION INTRAVENOUS at 09:00

## 2022-08-22 RX ADMIN — LIDOCAINE HYDROCHLORIDE 60 MG: 20 INJECTION, SOLUTION EPIDURAL; INFILTRATION; INTRACAUDAL; PERINEURAL at 09:00

## 2022-08-22 RX ADMIN — PROPOFOL 100 MG: 10 INJECTION, EMULSION INTRAVENOUS at 09:00

## 2022-08-22 NOTE — ANESTHESIA POSTPROCEDURE EVALUATION
Patient: Sajan Hermosillo    Procedure Summary     Date: 08/22/22 Room / Location: [facility] ENDOSCOPY 2 / [facility] ENDOSCOPY    Anesthesia Start: 0858 Anesthesia Stop: 0928    Procedures:       ESOPHAGOGASTRODUODENOSCOPY with biopsy (N/A )      COLONOSCOPY (N/A ) Diagnosis:       Colon cancer screening      Hiatal hernia      (Colon cancer screening [Z12.11])      (Hiatal hernia [K44.9])    Surgeons: Robert Koo MD Provider: Reyes, Mirabelle, DO    Anesthesia Type: MAC ASA Status: 2          Anesthesia Type: MAC    Vitals  Vitals Value Taken Time   /67 08/22/22 0932   Temp 36.2 °C (97.2 °F) 08/22/22 0926   Pulse 61 08/22/22 0933   Resp 16 08/22/22 0926   SpO2 99 % 08/22/22 0933   Vitals shown include unvalidated device data.        Post Anesthesia Care and Evaluation    Patient location during evaluation: bedside  Patient participation: complete - patient participated  Level of consciousness: awake  Pain score: 0  Pain management: adequate    Airway patency: patent  Anesthetic complications: No anesthetic complications  PONV Status: none  Cardiovascular status: acceptable and stable  Respiratory status: acceptable and room air  Hydration status: acceptable    Comments: An Anesthesiologist personally participated in the most demanding procedures (including induction and emergence if applicable) in the anesthesia plan, monitored the course of anesthesia administration at frequent intervals and remained physically present and available for immediate diagnosis and treatment of emergencies.

## 2022-08-22 NOTE — H&P
ScreeningPre Procedure History & Physical    Chief Complaint:   Screening   gerd    Subjective     HPI:   Screening     Past Medical History:   Past Medical History:   Diagnosis Date   • Abdominal pain    • Ankle pain    • Arthritis    • Fracture    • Heel pain    • High cholesterol    • HTN (hypertension)    • Hyperlipemia    • Ingrowing toenail    • Limb swelling    • Prostate disorder    • Seasonal allergies    • Sleep apnea    • Type 2 diabetes mellitus (HCC)        Past Surgical History:  Past Surgical History:   Procedure Laterality Date   • COLONOSCOPY  2019    Dr. Gomes   • ENDOSCOPY  2019    Dr. Gomes   • FOOT SURGERY Right    • OTHER SURGICAL HISTORY      Artificial joints/limbs   • OTHER SURGICAL HISTORY      Metal Implants   • REPLACEMENT TOTAL KNEE Left        Family History:  Family History   Problem Relation Age of Onset   • Heart disease Mother    • Diabetes Mother         unspecified type   • Arthritis Mother    • Heart disease Father    • Arthritis Father    • Heart disease Sister    • Diabetes Sister         unspecified type   • Heart disease Brother    • Arthritis Brother    • Colon cancer Neg Hx    • Malig Hyperthermia Neg Hx        Social History:   reports that he quit smoking about 47 years ago. His smoking use included cigarettes. He has quit using smokeless tobacco. He reports that he does not drink alcohol and does not use drugs.    Medications:   Medications Prior to Admission   Medication Sig Dispense Refill Last Dose   • aspirin 81 MG EC tablet Aspir-81 81 mg oral tablet,delayed release (DR/EC) take 1 tablet (81 mg) by oral route once daily   Active   8/20/2022   • atorvastatin (LIPITOR) 40 MG tablet atorvastatin 40 mg oral tablet take 1 tablet (40 mg) by oral route once daily   Active   8/20/2022   • carboxymethylcellulose (REFRESH PLUS) 0.5 % solution 1 drop.   8/20/2022   • cetirizine (zyrTEC) 10 MG tablet cetirizine 10 mg oral tablet take 1 tablet (10 mg) by oral route once  daily   Active   8/20/2022   • citalopram (CeleXA) 20 MG tablet citalopram 20 mg oral tablet take 1 tablet (20 mg) by oral route once daily   Active   8/20/2022   • esomeprazole (nexIUM) 40 MG capsule Nexium 40 mg oral capsule,delayed release(DR/EC) take 1 capsule (40 mg) by oral route once daily   Active   8/20/2022   • fluticasone (FLONASE) 50 MCG/ACT nasal spray fluticasone 50 mcg/actuation nasal spray,suspension spray 1 - 2 sprays (50 - 100 mcg) in each nostril by intranasal route once daily as needed   Active      • insulin glargine (Lantus) 100 UNIT/ML injection Lantus 100 unit/mL subcutaneous solution inject by subcutaneous route as per insulin protocol   Active   8/20/2022   • ketoconazole (NIZORAL) 2 % cream ketoconazole 2 % topical cream apply to the affected area(s) by topical route once daily   Suspended   8/20/2022   • lisinopril (PRINIVIL,ZESTRIL) 20 MG tablet lisinopril 20 mg oral tablet take 1 tablet (20 mg) by oral route once daily   Active   8/20/2022   • metFORMIN (GLUCOPHAGE) 1000 MG tablet metformin 1,000 mg oral tablet take 1 tablet (1,000 mg) by oral route 2 times per day with morning and evening meals   Active   8/20/2022   • metoprolol succinate XL (TOPROL-XL) 50 MG 24 hr tablet Toprol XL 50 mg oral tablet extended release 24 hr take 1 tablet (50 mg) by oral route once daily   Active   8/20/2022   • metroNIDAZOLE (METROGEL) 1 % gel    8/20/2022   • montelukast (SINGULAIR) 10 MG tablet montelukast 10 mg oral tablet take 1 tablet (10 mg) by oral route once daily in the evening   Active   8/20/2022   • Semaglutide, 1 MG/DOSE, (OZEMPIC) 2 MG/1.5ML solution pen-injector Inject  under the skin into the appropriate area as directed 1 (One) Time Per Week.   8/13/2022   • nitroglycerin (NITROSTAT) 0.4 MG SL tablet nitroglycerin 0.4 mg sublingual tablet, sublingual place 1 tablet (0.4 mg) by buccal route at the first sign of an attack; no more than 3 tabs are recommended within a 15 minute period.    Active   Unknown at Unknown time       Allergies:  Hydrocodone, Hydrocodone-acetaminophen, Hydrocodone-ibuprofen, Oxycodone, and Penicillins        Objective     Blood pressure 136/76, pulse 61, temperature 97.8 °F (36.6 °C), temperature source Temporal, resp. rate 16, weight 91 kg (200 lb 9.9 oz), SpO2 98 %.    Physical Exam   Constitutional: Pt is oriented to person, place, and time and well-developed, well-nourished, and in no distress.   Mouth/Throat: Oropharynx is clear and moist.   Neck: Normal range of motion.   Cardiovascular: Normal rate, regular rhythm and normal heart sounds.    Pulmonary/Chest: Effort normal and breath sounds normal.   Abdominal: Soft. Nontender  Skin: Skin is warm and dry.   Psychiatric: Mood, memory, affect and judgment normal.     Assessment & Plan     Diagnosis:  Screening colonoscopy  gerd    Anticipated Surgical Procedure:  colonoscopy  egd    The risks, benefits, and alternatives of this procedure have been discussed with the patient or the responsible party- the patient understands and agrees to proceed.

## 2022-08-22 NOTE — ANESTHESIA PREPROCEDURE EVALUATION
Anesthesia Evaluation     Patient summary reviewed and Nursing notes reviewed   no history of anesthetic complications:  NPO Solid Status: > 8 hours  NPO Liquid Status: > 2 hours           Airway   Mallampati: II  TM distance: >3 FB  Neck ROM: full  No difficulty expected  Dental    (+) upper dentures    Pulmonary - normal exam    breath sounds clear to auscultation  (+) sleep apnea,   Cardiovascular - normal exam  Exercise tolerance: good (4-7 METS)    ECG reviewed  Beta blocker not taken-may be given intraoperatively  Rhythm: regular  Rate: normal    (+) hypertension well controlled 2 medications or greater, hyperlipidemia,     ROS comment: · 59%).  · Myocardial perfusion imaging indicates a normal myocardial perfusion study with no evidence of ischemia.  · Impressions are consistent with a low risk study.    EKG shows possible atrial ectopy    Neuro/Psych- negative ROS  GI/Hepatic/Renal/Endo    (+)  hiatal hernia, GERD well controlled,  diabetes mellitus type 2 using insulin,     Musculoskeletal     Abdominal    Substance History - negative use     OB/GYN negative ob/gyn ROS         Other   arthritis,                      Anesthesia Plan    ASA 2     MAC       Anesthetic plan, risks, benefits, and alternatives have been provided, discussed and informed consent has been obtained with: patient.    Plan discussed with CRNA.        CODE STATUS:

## 2022-08-23 LAB
CYTO UR: NORMAL
LAB AP CASE REPORT: NORMAL
LAB AP CLINICAL INFORMATION: NORMAL
PATH REPORT.FINAL DX SPEC: NORMAL
PATH REPORT.GROSS SPEC: NORMAL

## 2022-09-26 ENCOUNTER — OFFICE VISIT (OUTPATIENT)
Dept: INTERNAL MEDICINE | Facility: CLINIC | Age: 77
End: 2022-09-26

## 2022-09-26 VITALS
OXYGEN SATURATION: 96 % | SYSTOLIC BLOOD PRESSURE: 117 MMHG | BODY MASS INDEX: 28.92 KG/M2 | WEIGHT: 202 LBS | TEMPERATURE: 98.2 F | HEIGHT: 70 IN | HEART RATE: 60 BPM | DIASTOLIC BLOOD PRESSURE: 73 MMHG

## 2022-09-26 DIAGNOSIS — K44.9 HIATAL HERNIA: Primary | ICD-10-CM

## 2022-09-26 DIAGNOSIS — Z12.5 SCREENING PSA (PROSTATE SPECIFIC ANTIGEN): ICD-10-CM

## 2022-09-26 DIAGNOSIS — E11.40 TYPE 2 DIABETES MELLITUS WITH DIABETIC NEUROPATHY, WITH LONG-TERM CURRENT USE OF INSULIN: ICD-10-CM

## 2022-09-26 DIAGNOSIS — E78.2 MIXED HYPERLIPIDEMIA: ICD-10-CM

## 2022-09-26 DIAGNOSIS — Z79.4 TYPE 2 DIABETES MELLITUS WITH DIABETIC NEUROPATHY, WITH LONG-TERM CURRENT USE OF INSULIN: ICD-10-CM

## 2022-09-26 DIAGNOSIS — J30.1 ALLERGIC RHINITIS DUE TO POLLEN, UNSPECIFIED SEASONALITY: ICD-10-CM

## 2022-09-26 DIAGNOSIS — I10 HYPERTENSION, ESSENTIAL: ICD-10-CM

## 2022-09-26 DIAGNOSIS — G25.81 RLS (RESTLESS LEGS SYNDROME): ICD-10-CM

## 2022-09-26 DIAGNOSIS — K21.9 GASTROESOPHAGEAL REFLUX DISEASE WITHOUT ESOPHAGITIS: ICD-10-CM

## 2022-09-26 PROCEDURE — 99214 OFFICE O/P EST MOD 30 MIN: CPT | Performed by: INTERNAL MEDICINE

## 2022-09-26 NOTE — PROGRESS NOTES
"Chief Complaint/ HPI: Patient is here to follow-up with [facility]22 here to follow-up with blood pressure, allergies, diabetes, says he is doing well he is being staying active vacationing a lot,          Objective   Vital Signs  Vitals:    09/26/22 1300   BP: 117/73   Pulse: 60   Temp: 98.2 °F (36.8 °C)   SpO2: 96%   Weight: 91.6 kg (202 lb)   Height: 177.8 cm (70\")      Body mass index is 28.98 kg/m².  Review of Systems   Constitutional: Negative.    HENT: Negative.    Eyes: Negative.    Respiratory: Negative.    Cardiovascular: Negative.    Gastrointestinal: Negative.    Endocrine: Negative.    Genitourinary: Negative.    Musculoskeletal: Negative.    Allergic/Immunologic: Negative.    Neurological: Negative.    Hematological: Negative.    Psychiatric/Behavioral: Negative.       Physical Exam  Constitutional:       General: He is not in acute distress.     Appearance: Normal appearance.   HENT:      Head: Normocephalic.      Mouth/Throat:      Mouth: Mucous membranes are moist.   Eyes:      Conjunctiva/sclera: Conjunctivae normal.      Pupils: Pupils are equal, round, and reactive to light.   Cardiovascular:      Rate and Rhythm: Normal rate and regular rhythm.      Pulses: Normal pulses.      Heart sounds: Normal heart sounds.   Pulmonary:      Effort: Pulmonary effort is normal.      Breath sounds: Normal breath sounds.   Abdominal:      General: Abdomen is flat. Bowel sounds are normal.      Palpations: Abdomen is soft.   Musculoskeletal:         General: No swelling. Normal range of motion.      Cervical back: Neck supple.   Skin:     General: Skin is warm and dry.      Coloration: Skin is not jaundiced.   Neurological:      General: No focal deficit present.      Mental Status: He is alert and oriented to person, place, and time. Mental status is at baseline.   Psychiatric:         Mood and Affect: Mood normal.         Behavior: Behavior normal.         Thought " Content: Thought content normal.         Judgment: Judgment normal.        Result Review :   No results found for: PROBNP, BNP          Lab Results   Component Value Date    TSH 2.300 08/07/2019      Lab Results   Component Value Date    FREET4 1.1 08/07/2019                          Visit Diagnoses:    ICD-10-CM ICD-9-CM   1. Hiatal hernia  K44.9 553.3   2. Screening PSA (prostate specific antigen)  Z12.5 V76.44   3. Hypertension, essential  I10 401.9   4. Type 2 diabetes mellitus with diabetic neuropathy, with long-term current use of insulin (HCC)  E11.40 250.60    Z79.4 357.2     V58.67   5. Mixed hyperlipidemia  E78.2 272.2   6. Gastroesophageal reflux disease without esophagitis  K21.9 530.81   7. RLS (restless legs syndrome)  G25.81 333.94   8. Allergic rhinitis due to pollen, unspecified seasonality  J30.1 477.0       Assessment and Plan   Diagnoses and all orders for this visit:    1. Hiatal hernia (Primary)    2. Screening PSA (prostate specific antigen)    3. Hypertension, essential    4. Type 2 diabetes mellitus with diabetic neuropathy, with long-term current use of insulin (HCC)    5. Mixed hyperlipidemia    6. Gastroesophageal reflux disease without esophagitis    7. RLS (restless legs syndrome)    8. Allergic rhinitis due to pollen, unspecified seasonality    colonoscopy/EGD,, Dr. Shepard, August 2022, diverticulosis, internal hemorrhoids,, hiatal hernia otherwise upper GI unremarkable    Cardiac issues of patient's concern, --cardiac stress testing myocardial perfusion imaging showed normal myocardial perfusion study no evidence of ischemia May 3, 2022, low risk study normal EKG stress test, normal ejection fraction    abd pain , wgt loss, back pain, r/o pancreatic isssues, pud , PT HAD CT scan abdomen and pelvis August 2019,---did not show any evidence of pancreatitis or intra-abdominal pathology, ------S/P EGD WITH DR OLSON OCT 2019, -he has some small nodular areas in both lung bases and some  interstitial changes -- CT scan of the chest March 2020,  reticular-nodular pattern in the lung bases, NO specific infiltrates or groundglass opacities are noted, F/U CT CHEST SEPT 2020--NO CHANGES, NO SIGNIFICANT OR worrisome lesions per radiologist, chronic lung changes noted, ----------- patient can have another CT scan abdomen and pelvis due to continued acid reflux, [facility]    Mild anemia hemoglobin, stable hemoglobin 13.4 August 2022 continues iron sulfate, daily    Type 2 diabetes --cont , Lantus 50 units daily, ozempic 1mg q weekly, hemoglobin A1c at 6.2 August 2022    Hypertension -continues-lisinopril 20 mg daily, Maxide 25 mg half a tablet daily     Elevated cholesterol-, continues Lipitor 10 mg daily    Gastroesophageal reflux --- continues Protonix 40 mg daily    Restless leg syndrome 10 years--- continues Mirapex,     allergies,--- continue Zyrtec 10 mg daily    Previous left total knee arthroplasty, right ankle fusion foot fusion    Depression, PTSD by history ----     [facility]    ed on prn sildenafil      Follow Up   Return in about 6 months (around 3/26/2023).  Patient was given instructions and counseling regarding his condition or for health maintenance advice. Please see specific information pulled into the AVS if appropriate.

## 2023-01-12 ENCOUNTER — OFFICE VISIT (OUTPATIENT)
Dept: PODIATRY | Facility: CLINIC | Age: 78
End: 2023-01-12
Payer: MEDICARE

## 2023-01-12 VITALS
BODY MASS INDEX: 29.2 KG/M2 | HEART RATE: 75 BPM | TEMPERATURE: 96.7 F | OXYGEN SATURATION: 98 % | DIASTOLIC BLOOD PRESSURE: 66 MMHG | HEIGHT: 70 IN | SYSTOLIC BLOOD PRESSURE: 119 MMHG | WEIGHT: 204 LBS

## 2023-01-12 DIAGNOSIS — E11.9 NON-INSULIN DEPENDENT TYPE 2 DIABETES MELLITUS: ICD-10-CM

## 2023-01-12 DIAGNOSIS — G62.9 NEUROPATHY: ICD-10-CM

## 2023-01-12 DIAGNOSIS — M79.671 FOOT PAIN, BILATERAL: ICD-10-CM

## 2023-01-12 DIAGNOSIS — M79.672 FOOT PAIN, BILATERAL: ICD-10-CM

## 2023-01-12 DIAGNOSIS — E11.8 DIABETIC FOOT: Primary | ICD-10-CM

## 2023-01-12 PROCEDURE — 99213 OFFICE O/P EST LOW 20 MIN: CPT | Performed by: PODIATRIST

## 2023-01-12 PROCEDURE — G8404 LOW EXTEMITY NEUR EXAM DOCUM: HCPCS | Performed by: PODIATRIST

## 2023-01-12 NOTE — PROGRESS NOTES
[facility] - PODIATRY    Today's Date: 01/12/23    Patient Name: Sajna Hermosillo  MRN: [id number]  CSN: [id number]  PCP: Juarez Arrington MD, Last PCP Visit:  9/26/2022  Referring Provider: No ref. provider found    SUBJECTIVE     Chief Complaint   Patient presents with   • Left Foot - Diabetes, Pain, Tingling     Heel pain    • Right Foot - Diabetes, Pain, Tingling     Heel pain      HPI: Sajan Hermosillo, a 77 y.o.male, comes presents to clinic for a diabetic foot evaluation.    New, Established, New Problem: established    Location: Bilateral feet    Duration: 2013    Onset: Insidious    Nature: NIDDM    Stable, worsening, improving: Stable    Patient controlling diabetes via: Oral medications    Patient states their most recent blood glucose reading was 117.    Patient denies any fevers, chills, nausea, vomiting, shortness of breathe, nor any other constitutional signs nor symptoms.    Pt stated having burning and tingling in rearfoot b/l; mostly at night.    Past Medical History:   Diagnosis Date   • Abdominal pain    • Ankle pain    • Arthritis    • Fracture    • Heel pain    • High cholesterol    • HTN (hypertension)    • Hyperlipemia    • Ingrowing toenail    • Limb swelling    • Neuropathy in diabetes (HCC) 2021   • Plantar fasciitis 2004   • Prostate disorder    • Seasonal allergies    • Sleep apnea    • Stroke (HCC) 2008   • Type 2 diabetes mellitus (HCC)      Past Surgical History:   Procedure Laterality Date   • COLONOSCOPY  2019    Dr. Gomes   • COLONOSCOPY N/A 08/22/2022    Procedure: COLONOSCOPY;  Surgeon: Robert Koo MD;  Location: [facility] ENDOSCOPY;  Service: Gastroenterology;  Laterality: N/A;  HEMORRHOIDS, DIVERTICULOSIS   • ENDOSCOPY  2019    Dr. Gomes   • ENDOSCOPY N/A 08/22/2022    Procedure: ESOPHAGOGASTRODUODENOSCOPY with biopsy;  Surgeon: Robert Koo MD;  Location: [facility] ENDOSCOPY;  Service: Gastroenterology;  Laterality: N/A;  SMALL HIATAL  HERNIA   • FOOT FRACTURE SURGERY     • FOOT SURGERY Right    • OTHER SURGICAL HISTORY      Artificial joints/limbs   • OTHER SURGICAL HISTORY      Metal Implants   • REPLACEMENT TOTAL KNEE Left      Family History   Problem Relation Age of Onset   • Heart disease Mother    • Diabetes Mother         unspecified type   • Arthritis Mother    • Heart disease Father    • Arthritis Father    • Heart disease Sister    • Diabetes Sister         unspecified type   • Heart disease Brother    • Arthritis Brother    • Diabetes Sister    • Colon cancer Neg Hx    • Malig Hyperthermia Neg Hx      Social History     Socioeconomic History   • Marital status:    Tobacco Use   • Smoking status: Former     Packs/day: 2.00     Years: 10.00     Pack years: 20.00     Types: Cigarettes, Pipe, Cigars     Start date: 1963     Quit date: 1975     Years since quittin.4   • Smokeless tobacco: Former   Vaping Use   • Vaping Use: Never used   Substance and Sexual Activity   • Alcohol use: Not Currently   • Drug use: Never   • Sexual activity: Yes     Partners: Female     Birth control/protection: Surgical, Tubal ligation     Allergies   Allergen Reactions   • Hydrocodone Rash   • Hydrocodone-Acetaminophen Rash   • Hydrocodone-Ibuprofen Rash   • Oxycodone Rash   • Penicillins Rash     Current Outpatient Medications   Medication Sig Dispense Refill   • aspirin 81 MG EC tablet Aspir-81 81 mg oral tablet,delayed release (DR/EC) take 1 tablet (81 mg) by oral route once daily   Active     • atorvastatin (LIPITOR) 40 MG tablet atorvastatin 40 mg oral tablet take 1 tablet (40 mg) by oral route once daily   Active     • carboxymethylcellulose (REFRESH PLUS) 0.5 % solution 1 drop.     • cetirizine (zyrTEC) 10 MG tablet cetirizine 10 mg oral tablet take 1 tablet (10 mg) by oral route once daily   Active     • citalopram (CeleXA) 20 MG tablet citalopram 20 mg oral tablet take 1 tablet (20 mg) by oral route once daily   Active     •  esomeprazole (nexIUM) 40 MG capsule Nexium 40 mg oral capsule,delayed release(DR/EC) take 1 capsule (40 mg) by oral route once daily   Active     • fluticasone (FLONASE) 50 MCG/ACT nasal spray fluticasone 50 mcg/actuation nasal spray,suspension spray 1 - 2 sprays (50 - 100 mcg) in each nostril by intranasal route once daily as needed   Active     • insulin glargine (Lantus) 100 UNIT/ML injection Lantus 100 unit/mL subcutaneous solution inject by subcutaneous route as per insulin protocol   Active     • ketoconazole (NIZORAL) 2 % cream ketoconazole 2 % topical cream apply to the affected area(s) by topical route once daily   Suspended     • lisinopril (PRINIVIL,ZESTRIL) 20 MG tablet lisinopril 20 mg oral tablet take 1 tablet (20 mg) by oral route once daily   Active     • metFORMIN (GLUCOPHAGE) 1000 MG tablet metformin 1,000 mg oral tablet take 1 tablet (1,000 mg) by oral route 2 times per day with morning and evening meals   Active     • metoprolol succinate XL (TOPROL-XL) 50 MG 24 hr tablet Toprol XL 50 mg oral tablet extended release 24 hr take 1 tablet (50 mg) by oral route once daily   Active     • metroNIDAZOLE (METROGEL) 1 % gel      • montelukast (SINGULAIR) 10 MG tablet montelukast 10 mg oral tablet take 1 tablet (10 mg) by oral route once daily in the evening   Active     • nitroglycerin (NITROSTAT) 0.4 MG SL tablet nitroglycerin 0.4 mg sublingual tablet, sublingual place 1 tablet (0.4 mg) by buccal route at the first sign of an attack; no more than 3 tabs are recommended within a 15 minute period.   Active     • Semaglutide, 1 MG/DOSE, (OZEMPIC) 2 MG/1.5ML solution pen-injector Inject  under the skin into the appropriate area as directed 1 (One) Time Per Week.       No current facility-administered medications for this visit.     Review of Systems   Constitutional: Negative.    Neurological: Positive for numbness.   All other systems reviewed and are negative.      OBJECTIVE     Vitals:    01/12/23 1445    BP: 119/66   Pulse: 75   Temp: 96.7 °F (35.9 °C)   SpO2: 98%       Body mass index is 29.27 kg/m².    Lab Results   Component Value Date    HGBA1C 6.2 (H) 09/28/2020       Lab Results   Component Value Date    GLUCOSE 94 09/28/2020    CALCIUM 9.6 09/28/2020     09/28/2020    K 4.8 09/28/2020    CO2 24 09/28/2020     09/28/2020    BUN 18 09/28/2020    CREATININE 1.22 (H) 09/28/2020    BCR 15 09/28/2020    ANIONGAP 15 09/28/2020       Patient seen in no apparent distress.      PHYSICAL EXAM:     Foot/Ankle Exam:       General:   Diabetic Foot Exam Performed    Appearance: elderly    Orientation: AAOx3    Affect: appropriate    Gait: unimpaired    Shoe Gear:  Casual shoes    VASCULAR      Right Foot Vascularity   Normal vascular exam    Dorsalis pedis:  2+  Posterior tibial:  2+  Skin Temperature: warm    Edema Grading:  None  CFT:  < 3 seconds  Pedal Hair Growth:  Absent  Varicosities: none       Left Foot Vascularity   Normal vascular exam    Dorsalis pedis:  2+  Posterior tibial:  2+  Skin Temperature: warm    Edema Grading:  None  CFT:  < 3 seconds  Pedal Hair Growth:  Absent  Varicosities: none        NEUROLOGIC     Right Foot Neurologic   Light touch sensation:  Diminished  Vibratory sensation:  Diminished  Hot/Cold sensation: diminished    Protective Sensation using [place] Monofilament:  7     Left Foot Neurologic   Light touch sensation:  Diminished  Vibratory sensation:  Diminished  Hot/cold sensation: diminished    Protective Sensation using [place]rishna Monofilament:  7     MUSCULOSKELETAL      Left Foot Musculoskeletal   Hallux limitus: Yes       MUSCLE STRENGTH     Right Foot Muscle Strength   Foot dorsiflexion:  4  Foot plantar flexion:  4  Foot inversion:  4  Foot eversion:  4     Left Foot Muscle Strength   Foot dorsiflexion:  4  Foot plantar flexion:  4  Foot inversion:  4  Foot eversion:  4     RANGE OF MOTION      Right Foot Range of Motion   Foot and ankle ROM within  normal limits    Right ankle active dorsiflexion: No range of motion of Chopart's and subtalar joints consistent with past surgical history of fusions of these joints.     Left Foot Range of Motion   Foot and ankle ROM within normal limits       DERMATOLOGIC     Right Foot Dermatologic   Skin: skin intact    Nails: normal       Left Foot Dermatologic   Skin: skin intact    Nails: normal              ASSESSMENT/PLAN     Diagnoses and all orders for this visit:    1. Diabetic foot (HCC) (Primary)    2. Non-insulin dependent type 2 diabetes mellitus (HCC)    3. Neuropathy    4. Foot pain, bilateral    Rx:  Topical, compounded, neuropathy medication was written; see attached prescription.    Comprehensive lower extremity examination and evaluation was performed.    Discussed findings and treatment plan including risks, benefits, and treatment options with patient in detail. Patient agreed with treatment plan.    Diabetic foot exam performed and documented this date, compliant with CQM required standards. Detail of findings as noted in physical exam.  Lower extremity Neurologic exam for diabetic patient performed and documented this date, compliant with PQRS required standards. Detail of findings as noted in physical exam.  Advised patient importance of good routine lower extremity hygiene. Advised patient importance of evaluating for intact skin and pain free nail borders.  Advised patient to use mirror to evaluate plantar/ soles of feet for better visualization. Advised patient monitor and phone office to be seen if any cracking to skin, open lesions, painful nail borders or if nails become elongated prior to next visit. Advised patient importance of daily cleansing of lower extremities, followed by good skin cream to maintain normal hydration of skin. Also advised patient importance of close daily monitoring of blood sugar. Advised to regulate diet and medications to maintain control of blood sugar in optimal range.  Contact primary care provider if difficulties maintaining blood sugar levels.  Advised Patient of presence of Diabetes Mellitus condition.  Advised Patient risk of progression and worsening or improvement, then return of condition.  Will monitor condition for any change in future. Treat with most appropriate treatment pending status of condition.  Counseled and advised patient extensively on nature and ramifications of diabetes. Standard instructions given to patient for good diabetic foot care and maintenance. Advised importance of careful monitoring to avoid break down and complications secondary to diabetes. Advised patient importance of strict maintenance of blood sugar control. Advised patient of possible ominous results from neglect of condition, i.e.: amputation/ loss of digits, feet and legs, or even death.  Patient states understands counseling, will monitor closely, continue good hygiene and routine diabetic foot care. Patient will contact office is questions or problems.      An After Visit Summary was printed and given to the patient at discharge, including (if requested) any available informative/educational handouts regarding diagnosis, treatment, or medications. All questions were answered to patient/family satisfaction. Should symptoms fail to improve or worsen they agree to call or return to clinic or to go to the Emergency Department. Discussed the importance of following up with any needed screening tests/labs/specialist appointments and any requested follow-up recommended by me today. Importance of maintaining follow-up discussed and patient accepts that missed appointments can delay diagnosis and potentially lead to worsening of conditions.    Return in about 1 year (around 1/12/2024) for Podiatry Diabetic Foot Exam., or sooner if acute issues arise.    This document has been electronically signed by Jeff Gross DPM on January 12, 2023 14:59 EST

## 2023-03-27 ENCOUNTER — OFFICE VISIT (OUTPATIENT)
Dept: INTERNAL MEDICINE | Facility: CLINIC | Age: 78
End: 2023-03-27
Payer: MEDICARE

## 2023-03-27 VITALS
TEMPERATURE: 97.8 F | BODY MASS INDEX: 30.41 KG/M2 | OXYGEN SATURATION: 97 % | HEIGHT: 70 IN | HEART RATE: 75 BPM | RESPIRATION RATE: 18 BRPM | SYSTOLIC BLOOD PRESSURE: 126 MMHG | DIASTOLIC BLOOD PRESSURE: 82 MMHG | WEIGHT: 212.4 LBS

## 2023-03-27 DIAGNOSIS — I10 HYPERTENSION, ESSENTIAL: ICD-10-CM

## 2023-03-27 DIAGNOSIS — E78.2 MIXED HYPERLIPIDEMIA: ICD-10-CM

## 2023-03-27 DIAGNOSIS — K21.9 GASTROESOPHAGEAL REFLUX DISEASE WITHOUT ESOPHAGITIS: ICD-10-CM

## 2023-03-27 DIAGNOSIS — Z00.00 ENCOUNTER FOR SUBSEQUENT ANNUAL WELLNESS VISIT (AWV) IN MEDICARE PATIENT: Primary | ICD-10-CM

## 2023-03-27 DIAGNOSIS — Z00.00 PHYSICAL EXAM, ANNUAL: ICD-10-CM

## 2023-03-27 DIAGNOSIS — G25.81 RLS (RESTLESS LEGS SYNDROME): ICD-10-CM

## 2023-03-27 DIAGNOSIS — E11.40 TYPE 2 DIABETES MELLITUS WITH DIABETIC NEUROPATHY, WITH LONG-TERM CURRENT USE OF INSULIN: ICD-10-CM

## 2023-03-27 DIAGNOSIS — Z12.5 SCREENING PSA (PROSTATE SPECIFIC ANTIGEN): ICD-10-CM

## 2023-03-27 DIAGNOSIS — Z79.4 TYPE 2 DIABETES MELLITUS WITH DIABETIC NEUROPATHY, WITH LONG-TERM CURRENT USE OF INSULIN: ICD-10-CM

## 2023-03-27 DIAGNOSIS — E55.9 VITAMIN D DEFICIENCY: ICD-10-CM

## 2023-03-27 NOTE — PROGRESS NOTES
"CHIEF COMPLAINT/ HPI:  Follow-up (79 y/o male is here today for a follow up chest Xray. Patient states he hasn't had labs in a while. No other issues at this time.)     Here to follow-up with diabetes high blood pressure, PTSD, he is not sure if he is taking Celexa anymore, he says he is doing fairly well he staying active he still travels some, no recent falls              Objective   Vital Signs  Vitals:    03/27/23 1230   BP: 126/82   BP Location: Left arm   Patient Position: Sitting   Cuff Size: Adult   Pulse: 75   Resp: 18   Temp: 97.8 °F (36.6 °C)   TempSrc: Temporal   SpO2: 97%   Weight: 96.3 kg (212 lb 6.4 oz)   Height: 177.8 cm (70\")      Body mass index is 30.48 kg/m².  Review of Systems   Constitutional: Negative.    HENT: Negative.    Eyes: Negative.    Respiratory: Negative.    Cardiovascular: Negative.    Gastrointestinal: Negative.    Endocrine: Negative.    Genitourinary: Negative.    Musculoskeletal: Negative.    Allergic/Immunologic: Negative.    Neurological: Negative.    Hematological: Negative.    Psychiatric/Behavioral: Negative.       Physical Exam  Constitutional:       General: He is not in acute distress.     Appearance: Normal appearance.   HENT:      Head: Normocephalic.      Mouth/Throat:      Mouth: Mucous membranes are moist.   Eyes:      Conjunctiva/sclera: Conjunctivae normal.      Pupils: Pupils are equal, round, and reactive to light.   Cardiovascular:      Rate and Rhythm: Normal rate and regular rhythm.      Pulses: Normal pulses.      Heart sounds: Normal heart sounds.   Pulmonary:      Effort: Pulmonary effort is normal.      Breath sounds: Normal breath sounds.   Abdominal:      General: Abdomen is flat. Bowel sounds are normal.      Palpations: Abdomen is soft.   Musculoskeletal:         General: No swelling. Normal range of motion.      Cervical back: Neck supple.   Skin:     General: Skin is warm and dry.      Coloration: Skin is not jaundiced.   Neurological:      General: " No focal deficit present.      Mental Status: He is alert and oriented to person, place, and time. Mental status is at baseline.   Psychiatric:         Mood and Affect: Mood normal.         Behavior: Behavior normal.         Thought Content: Thought content normal.         Judgment: Judgment normal.        Result Review :   No results found for: PROBNP, BNP          Lab Results   Component Value Date    TSH 2.300 08/07/2019      Lab Results   Component Value Date    FREET4 1.1 08/07/2019                          Visit Diagnoses:    ICD-10-CM ICD-9-CM   1. Encounter for subsequent annual wellness visit (AWV) in Medicare patient  Z00.00 V70.0   2. Screening PSA (prostate specific antigen)  Z12.5 V76.44   3. Hypertension, essential  I10 401.9   4. Type 2 diabetes mellitus with diabetic neuropathy, with long-term current use of insulin (HCC)  E11.40 250.60    Z79.4 357.2     V58.67   5. RLS (restless legs syndrome)  G25.81 333.94   6. Mixed hyperlipidemia  E78.2 272.2   7. Gastroesophageal reflux disease without esophagitis  K21.9 530.81   8. Vitamin D deficiency  E55.9 268.9   9. Physical exam, annual  Z00.00 V70.0       Assessment and Plan   Diagnoses and all orders for this visit:    1. Encounter for subsequent annual wellness visit (AWV) in Medicare patient (Primary)  -     Comprehensive Metabolic Panel; Future  -     CBC & Differential; Future  -     Lipid Panel; Future  -     PSA Screen; Future  -     Vitamin B12 anemia; Future  -     Folate anemia; Future  -     Vitamin D,25-Hydroxy; Future  -     TSH+Free T4; Future  -     Hemoglobin A1c; Future  -     Comprehensive Metabolic Panel; Future    2. Screening PSA (prostate specific antigen)  -     Comprehensive Metabolic Panel; Future  -     CBC & Differential; Future  -     Lipid Panel; Future  -     PSA Screen; Future  -     Vitamin B12 anemia; Future  -     Folate anemia; Future  -     Vitamin D,25-Hydroxy; Future  -     TSH+Free T4; Future  -     Hemoglobin A1c;  Future  -     Comprehensive Metabolic Panel; Future    3. Hypertension, essential  -     Comprehensive Metabolic Panel; Future  -     CBC & Differential; Future  -     Lipid Panel; Future  -     PSA Screen; Future  -     Vitamin B12 anemia; Future  -     Folate anemia; Future  -     Vitamin D,25-Hydroxy; Future  -     TSH+Free T4; Future  -     Hemoglobin A1c; Future  -     Comprehensive Metabolic Panel; Future    4. Type 2 diabetes mellitus with diabetic neuropathy, with long-term current use of insulin (HCC)  -     Comprehensive Metabolic Panel; Future  -     CBC & Differential; Future  -     Lipid Panel; Future  -     PSA Screen; Future  -     Vitamin B12 anemia; Future  -     Folate anemia; Future  -     Vitamin D,25-Hydroxy; Future  -     TSH+Free T4; Future  -     Hemoglobin A1c; Future  -     Comprehensive Metabolic Panel; Future    5. RLS (restless legs syndrome)  -     Comprehensive Metabolic Panel; Future  -     CBC & Differential; Future  -     Lipid Panel; Future  -     PSA Screen; Future  -     Vitamin B12 anemia; Future  -     Folate anemia; Future  -     Vitamin D,25-Hydroxy; Future  -     TSH+Free T4; Future  -     Hemoglobin A1c; Future  -     Comprehensive Metabolic Panel; Future    6. Mixed hyperlipidemia  -     Comprehensive Metabolic Panel; Future  -     CBC & Differential; Future  -     Lipid Panel; Future  -     PSA Screen; Future  -     Vitamin B12 anemia; Future  -     Folate anemia; Future  -     Vitamin D,25-Hydroxy; Future  -     TSH+Free T4; Future  -     Hemoglobin A1c; Future  -     Comprehensive Metabolic Panel; Future    7. Gastroesophageal reflux disease without esophagitis  -     Comprehensive Metabolic Panel; Future  -     CBC & Differential; Future  -     Lipid Panel; Future  -     PSA Screen; Future  -     Vitamin B12 anemia; Future  -     Folate anemia; Future  -     Vitamin D,25-Hydroxy; Future  -     TSH+Free T4; Future  -     Hemoglobin A1c; Future  -     Comprehensive  Metabolic Panel; Future    8. Vitamin D deficiency  -     Comprehensive Metabolic Panel; Future  -     CBC & Differential; Future  -     Lipid Panel; Future  -     PSA Screen; Future  -     Vitamin B12 anemia; Future  -     Folate anemia; Future  -     Vitamin D,25-Hydroxy; Future  -     TSH+Free T4; Future  -     Hemoglobin A1c; Future  -     Comprehensive Metabolic Panel; Future    9. Physical exam, annual  -     Comprehensive Metabolic Panel; Future  -     CBC & Differential; Future  -     Lipid Panel; Future  -     PSA Screen; Future  -     Vitamin B12 anemia; Future  -     Folate anemia; Future  -     Vitamin D,25-Hydroxy; Future  -     TSH+Free T4; Future  -     Hemoglobin A1c; Future  -     Comprehensive Metabolic Panel; Future             Fall January 24, 2023 injured right chest, anterior right fourth rib fracture on x-rays, getting better     colonoscopy/EGD,, Dr. Shepard, August 2022, diverticulosis, internal hemorrhoids,, hiatal hernia otherwise upper GI unremarkable    Chest pain --cardiac stress testing--May 4, 2022,- myocardial perfusion imaging showed normal myocardial perfusion study no evidence of ischemia May 3, 2022, low risk study normal EKG stress test, normal ejection fraction    abd pain , wgt loss, back pain, r/o pancreatic isssues, pud , PT HAD CT scan abdomen and pelvis August 2019,---did not show any evidence of pancreatitis or intra-abdominal pathology, ------S/P EGD WITH DR OLSON OCT 2019, -he has some small nodular areas in both lung bases and some interstitial changes -- CT scan of the chest March 2020,  reticular-nodular pattern in the lung bases, NO specific infiltrates or groundglass opacities are noted, F/U CT CHEST SEPT 2020--NO CHANGES, NO SIGNIFICANT OR worrisome lesions per radiologist, chronic lung changes noted,     Mild anemia ----- stable hemoglobin 13.4 August 2022 continues iron sulfate, daily    Type 2 diabetes --cont , Lantus 50 units daily, ozempic 1mg q weekly,  hemoglobin A1c at 6.2 August 2022    Hypertension -continues-lisinopril 20 mg daily, Maxide 25 mg half a tablet daily     Elevated cholesterol-, continues Lipitor 10 mg daily    Gastroesophageal reflux --- continues Protonix 40 mg daily    Restless leg syndrome 10 years--- continues Mirapex,     allergies,--- continue Zyrtec 10 mg daily    Previous left total knee arthroplasty, right ankle fusion foot fusion    Depression, PTSD by history ----previously taking Celexa 20 mg daily [facility] soon March 2023    [facility],, JAN 2021    ed on prn sildenafil    Follow Up   Return in about 6 months (around 9/27/2023).  Patient was given instructions and counseling regarding his condition or for health maintenance advice. Please see specific information pulled into the AVS if appropriate.

## 2023-03-27 NOTE — PROGRESS NOTES
The ABCs of the Annual Wellness Visit  Subsequent Medicare Wellness Visit    Subjective      Sajan Hermosillo is a 78 y.o. male who presents for a Subsequent Medicare Wellness Visit.    The following portions of the patient's history were reviewed and   updated as appropriate: allergies, current medications, past family history, past medical history, past social history, past surgical history and problem list.    Compared to one year ago, the patient feels his physical   health is the same.    Compared to one year ago, the patient feels his mental   health is worse.    Recent Hospitalizations:  He was not admitted to the hospital during the last year.       Current Medical Providers:  Patient Care Team:  Juarez Arrington MD as PCP - General (Internal Medicine)    Outpatient Medications Prior to Visit   Medication Sig Dispense Refill   • aspirin 81 MG EC tablet Aspir-81 81 mg oral tablet,delayed release (DR/EC) take 1 tablet (81 mg) by oral route once daily   Active     • atorvastatin (LIPITOR) 40 MG tablet atorvastatin 40 mg oral tablet take 1 tablet (40 mg) by oral route once daily   Active     • carboxymethylcellulose (REFRESH PLUS) 0.5 % solution 1 drop.     • cetirizine (zyrTEC) 10 MG tablet cetirizine 10 mg oral tablet take 1 tablet (10 mg) by oral route once daily   Active     • citalopram (CeleXA) 20 MG tablet citalopram 20 mg oral tablet take 1 tablet (20 mg) by oral route once daily   Active     • esomeprazole (nexIUM) 40 MG capsule Nexium 40 mg oral capsule,delayed release(DR/EC) take 1 capsule (40 mg) by oral route once daily   Active     • fluticasone (FLONASE) 50 MCG/ACT nasal spray fluticasone 50 mcg/actuation nasal spray,suspension spray 1 - 2 sprays (50 - 100 mcg) in each nostril by intranasal route once daily as needed   Active     • insulin glargine (Lantus) 100 UNIT/ML injection 50 Units Daily.     • ketoconazole (NIZORAL) 2 % cream ketoconazole 2 % topical cream apply to the affected area(s)  by topical route once daily   Suspended     • lisinopril (PRINIVIL,ZESTRIL) 20 MG tablet lisinopril 20 mg oral tablet take 1 tablet (20 mg) by oral route once daily   Active     • metoprolol succinate XL (TOPROL-XL) 50 MG 24 hr tablet Toprol XL 50 mg oral tablet extended release 24 hr take 1 tablet (50 mg) by oral route once daily   Active     • metroNIDAZOLE (METROGEL) 1 % gel      • montelukast (SINGULAIR) 10 MG tablet montelukast 10 mg oral tablet take 1 tablet (10 mg) by oral route once daily in the evening   Active     • nitroglycerin (NITROSTAT) 0.4 MG SL tablet nitroglycerin 0.4 mg sublingual tablet, sublingual place 1 tablet (0.4 mg) by buccal route at the first sign of an attack; no more than 3 tabs are recommended within a 15 minute period.   Active     • Semaglutide, 1 MG/DOSE, (OZEMPIC) 2 MG/1.5ML solution pen-injector Inject  under the skin into the appropriate area as directed 1 (One) Time Per Week.     • metFORMIN (GLUCOPHAGE) 1000 MG tablet metformin 1,000 mg oral tablet take 1 tablet (1,000 mg) by oral route 2 times per day with morning and evening meals   Active (Patient not taking: Reported on 3/27/2023)       No facility-administered medications prior to visit.       No opioid medication identified on active medication list. I have reviewed chart for other potential  high risk medication/s and harmful drug interactions in the elderly.          Aspirin is on active medication list. Aspirin use is not indicated based on review of current medical condition/s. Risk of harm outweighs potential benefits. Patient instructed to discontinue this medication.  .      Patient Active Problem List   Diagnosis   • Hypertension, essential   • Allergic rhinitis due to pollen   • Gastroesophageal reflux disease without esophagitis   • Acute right-sided low back pain without sciatica   • RLS (restless legs syndrome)   • Mixed hyperlipidemia   • Encounter for subsequent annual wellness visit (AWV) in Medicare  "patient   • Diabetes 1.5, managed as type 2 ([facility])   • Type 2 diabetes mellitus with diabetic neuropathy, with long-term current use of insulin ([facility])   • Hiatal hernia   • Vitamin D deficiency     Advance Care Planning  Advance Directive is not on file.  ACP discussion was held with the patient during this visit. Patient has an advance directive (not in EMR), copy requested.     Objective    Vitals:    23 1230   BP: 126/82   BP Location: Left arm   Patient Position: Sitting   Cuff Size: Adult   Pulse: 75   Resp: 18   Temp: 97.8 °F (36.6 °C)   TempSrc: Temporal   SpO2: 97%   Weight: 96.3 kg (212 lb 6.4 oz)   Height: 177.8 cm (70\")     Estimated body mass index is 30.48 kg/m² as calculated from the following:    Height as of this encounter: 177.8 cm (70\").    Weight as of this encounter: 96.3 kg (212 lb 6.4 oz).    BMI is >= 30 and <35. (Class 1 Obesity). The following options were offered after discussion;: weight loss educational material (shared in after visit summary), exercise counseling/recommendations and nutrition counseling/recommendations      Does the patient have evidence of cognitive impairment?   No            HEALTH RISK ASSESSMENT    Smoking Status:  Social History     Tobacco Use   Smoking Status Former   • Packs/day: 2.00   • Years: 10.00   • Pack years: 20.00   • Types: Cigarettes, Pipe, Cigars   • Start date: 1963   • Quit date: 1975   • Years since quittin.6   Smokeless Tobacco Former     Alcohol Consumption:  Social History     Substance and Sexual Activity   Alcohol Use Not Currently     Fall Risk Screen:    RODRIADI Fall Risk Assessment has not been completed.    Depression Screening:  PHQ-2/PHQ-9 Depression Screening 3/23/2022   Little Interest or Pleasure in Doing Things 0-->not at all   Feeling Down, Depressed or Hopeless 0-->not at all   PHQ-9: Brief Depression Severity Measure Score 0       Health Habits and Functional and Cognitive Screening:  No flowsheet data " found.    Age-appropriate Screening Schedule:  Refer to the list below for future screening recommendations based on patient's age, sex and/or medical conditions. Orders for these recommended tests are listed in the plan section. The patient has been provided with a written plan.    Health Maintenance   Topic Date Due   • URINE MICROALBUMIN  Never done   • Pneumococcal Vaccine 65+ (1 - PCV) Never done   • TDAP/TD VACCINES (1 - Tdap) Never done   • ZOSTER VACCINE (1 of 2) Never done   • HEPATITIS C SCREENING  Never done   • HEMOGLOBIN A1C  07/26/2021   • DIABETIC EYE EXAM  Never done   • LIPID PANEL  09/28/2021   • COVID-19 Vaccine (4 - Booster) 08/12/2022   • ANNUAL WELLNESS VISIT  03/27/2024   • INFLUENZA VACCINE  Completed   • COLORECTAL CANCER SCREENING  Discontinued                CMS Preventative Services Quick Reference  Risk Factors Identified During Encounter:    Depression/Dysphoria: Referral to Mental Health specialist    The above risks/problems have been discussed with the patient.  Pertinent information has been shared with the patient in the After Visit Summary.    Diagnoses and all orders for this visit:    1. Encounter for subsequent annual wellness visit (AWV) in Medicare patient (Primary)  -     Comprehensive Metabolic Panel; Future  -     CBC & Differential; Future  -     Lipid Panel; Future  -     PSA Screen; Future  -     Vitamin B12 anemia; Future  -     Folate anemia; Future  -     Vitamin D,25-Hydroxy; Future  -     TSH+Free T4; Future  -     Hemoglobin A1c; Future  -     Comprehensive Metabolic Panel; Future    2. Screening PSA (prostate specific antigen)  -     Comprehensive Metabolic Panel; Future  -     CBC & Differential; Future  -     Lipid Panel; Future  -     PSA Screen; Future  -     Vitamin B12 anemia; Future  -     Folate anemia; Future  -     Vitamin D,25-Hydroxy; Future  -     TSH+Free T4; Future  -     Hemoglobin A1c; Future  -     Comprehensive Metabolic Panel; Future    3.  Hypertension, essential  -     Comprehensive Metabolic Panel; Future  -     CBC & Differential; Future  -     Lipid Panel; Future  -     PSA Screen; Future  -     Vitamin B12 anemia; Future  -     Folate anemia; Future  -     Vitamin D,25-Hydroxy; Future  -     TSH+Free T4; Future  -     Hemoglobin A1c; Future  -     Comprehensive Metabolic Panel; Future    4. Type 2 diabetes mellitus with diabetic neuropathy, with long-term current use of insulin (HCC)  -     Comprehensive Metabolic Panel; Future  -     CBC & Differential; Future  -     Lipid Panel; Future  -     PSA Screen; Future  -     Vitamin B12 anemia; Future  -     Folate anemia; Future  -     Vitamin D,25-Hydroxy; Future  -     TSH+Free T4; Future  -     Hemoglobin A1c; Future  -     Comprehensive Metabolic Panel; Future    5. RLS (restless legs syndrome)  -     Comprehensive Metabolic Panel; Future  -     CBC & Differential; Future  -     Lipid Panel; Future  -     PSA Screen; Future  -     Vitamin B12 anemia; Future  -     Folate anemia; Future  -     Vitamin D,25-Hydroxy; Future  -     TSH+Free T4; Future  -     Hemoglobin A1c; Future  -     Comprehensive Metabolic Panel; Future    6. Mixed hyperlipidemia  -     Comprehensive Metabolic Panel; Future  -     CBC & Differential; Future  -     Lipid Panel; Future  -     PSA Screen; Future  -     Vitamin B12 anemia; Future  -     Folate anemia; Future  -     Vitamin D,25-Hydroxy; Future  -     TSH+Free T4; Future  -     Hemoglobin A1c; Future  -     Comprehensive Metabolic Panel; Future    7. Gastroesophageal reflux disease without esophagitis  -     Comprehensive Metabolic Panel; Future  -     CBC & Differential; Future  -     Lipid Panel; Future  -     PSA Screen; Future  -     Vitamin B12 anemia; Future  -     Folate anemia; Future  -     Vitamin D,25-Hydroxy; Future  -     TSH+Free T4; Future  -     Hemoglobin A1c; Future  -     Comprehensive Metabolic Panel; Future    8. Vitamin D deficiency  -      Comprehensive Metabolic Panel; Future  -     CBC & Differential; Future  -     Lipid Panel; Future  -     PSA Screen; Future  -     Vitamin B12 anemia; Future  -     Folate anemia; Future  -     Vitamin D,25-Hydroxy; Future  -     TSH+Free T4; Future  -     Hemoglobin A1c; Future  -     Comprehensive Metabolic Panel; Future    9. Physical exam, annual  -     Comprehensive Metabolic Panel; Future  -     CBC & Differential; Future  -     Lipid Panel; Future  -     PSA Screen; Future  -     Vitamin B12 anemia; Future  -     Folate anemia; Future  -     Vitamin D,25-Hydroxy; Future  -     TSH+Free T4; Future  -     Hemoglobin A1c; Future  -     Comprehensive Metabolic Panel; Future        Follow Up:   Next Medicare Wellness visit to be scheduled in 1 year.      An After Visit Summary and PPPS were made available to the patient.

## 2023-03-31 ENCOUNTER — LAB (OUTPATIENT)
Dept: INTERNAL MEDICINE | Facility: CLINIC | Age: 78
End: 2023-03-31
Payer: MEDICARE

## 2023-03-31 ENCOUNTER — OFFICE VISIT (OUTPATIENT)
Dept: CARDIOLOGY | Facility: CLINIC | Age: 78
End: 2023-03-31
Payer: MEDICARE

## 2023-03-31 ENCOUNTER — TELEPHONE (OUTPATIENT)
Dept: INTERNAL MEDICINE | Facility: CLINIC | Age: 78
End: 2023-03-31

## 2023-03-31 VITALS
SYSTOLIC BLOOD PRESSURE: 139 MMHG | WEIGHT: 208 LBS | BODY MASS INDEX: 29.78 KG/M2 | HEIGHT: 70 IN | HEART RATE: 65 BPM | DIASTOLIC BLOOD PRESSURE: 72 MMHG

## 2023-03-31 DIAGNOSIS — Z00.00 ENCOUNTER FOR SUBSEQUENT ANNUAL WELLNESS VISIT (AWV) IN MEDICARE PATIENT: ICD-10-CM

## 2023-03-31 DIAGNOSIS — E11.40 TYPE 2 DIABETES MELLITUS WITH DIABETIC NEUROPATHY, WITH LONG-TERM CURRENT USE OF INSULIN: ICD-10-CM

## 2023-03-31 DIAGNOSIS — Z00.00 PHYSICAL EXAM, ANNUAL: ICD-10-CM

## 2023-03-31 DIAGNOSIS — K21.9 GASTROESOPHAGEAL REFLUX DISEASE WITHOUT ESOPHAGITIS: ICD-10-CM

## 2023-03-31 DIAGNOSIS — G25.81 RLS (RESTLESS LEGS SYNDROME): ICD-10-CM

## 2023-03-31 DIAGNOSIS — I25.10 MILD CAD: ICD-10-CM

## 2023-03-31 DIAGNOSIS — E78.2 MIXED HYPERLIPIDEMIA: ICD-10-CM

## 2023-03-31 DIAGNOSIS — E55.9 VITAMIN D DEFICIENCY: ICD-10-CM

## 2023-03-31 DIAGNOSIS — R06.09 DYSPNEA ON EXERTION: Primary | ICD-10-CM

## 2023-03-31 DIAGNOSIS — I10 HYPERTENSION, ESSENTIAL: ICD-10-CM

## 2023-03-31 DIAGNOSIS — Z79.4 TYPE 2 DIABETES MELLITUS WITH DIABETIC NEUROPATHY, WITH LONG-TERM CURRENT USE OF INSULIN: ICD-10-CM

## 2023-03-31 DIAGNOSIS — E78.2 HYPERLIPEMIA, MIXED: ICD-10-CM

## 2023-03-31 DIAGNOSIS — Z12.5 SCREENING PSA (PROSTATE SPECIFIC ANTIGEN): ICD-10-CM

## 2023-03-31 LAB
25(OH)D3 SERPL-MCNC: 36.4 NG/ML (ref 30–100)
ALBUMIN SERPL-MCNC: 4.5 G/DL (ref 3.5–5.2)
ALBUMIN/GLOB SERPL: 1.3 G/DL
ALP SERPL-CCNC: 97 U/L (ref 39–117)
ALT SERPL W P-5'-P-CCNC: 27 U/L (ref 1–41)
ANION GAP SERPL CALCULATED.3IONS-SCNC: 10.7 MMOL/L (ref 5–15)
AST SERPL-CCNC: 21 U/L (ref 1–40)
BASOPHILS # BLD AUTO: 0.03 10*3/MM3 (ref 0–0.2)
BASOPHILS NFR BLD AUTO: 0.4 % (ref 0–1.5)
BILIRUB SERPL-MCNC: 0.4 MG/DL (ref 0–1.2)
BUN SERPL-MCNC: 18 MG/DL (ref 8–23)
BUN/CREAT SERPL: 14.5 (ref 7–25)
CALCIUM SPEC-SCNC: 10.2 MG/DL (ref 8.6–10.5)
CHLORIDE SERPL-SCNC: 102 MMOL/L (ref 98–107)
CHOLEST SERPL-MCNC: 122 MG/DL (ref 0–200)
CO2 SERPL-SCNC: 25.3 MMOL/L (ref 22–29)
CREAT SERPL-MCNC: 1.24 MG/DL (ref 0.76–1.27)
DEPRECATED RDW RBC AUTO: 43.1 FL (ref 37–54)
EGFRCR SERPLBLD CKD-EPI 2021: 60.3 ML/MIN/1.73
EOSINOPHIL # BLD AUTO: 0.17 10*3/MM3 (ref 0–0.4)
EOSINOPHIL NFR BLD AUTO: 2.4 % (ref 0.3–6.2)
ERYTHROCYTE [DISTWIDTH] IN BLOOD BY AUTOMATED COUNT: 13.5 % (ref 12.3–15.4)
FOLATE SERPL-MCNC: 13.3 NG/ML (ref 4.78–24.2)
GLOBULIN UR ELPH-MCNC: 3.5 GM/DL
GLUCOSE SERPL-MCNC: 101 MG/DL (ref 65–99)
HBA1C MFR BLD: 6.8 % (ref 4.8–5.6)
HCT VFR BLD AUTO: 41.4 % (ref 37.5–51)
HDLC SERPL-MCNC: 45 MG/DL (ref 40–60)
HGB BLD-MCNC: 14.3 G/DL (ref 13–17.7)
IMM GRANULOCYTES # BLD AUTO: 0.02 10*3/MM3 (ref 0–0.05)
IMM GRANULOCYTES NFR BLD AUTO: 0.3 % (ref 0–0.5)
LDLC SERPL CALC-MCNC: 58 MG/DL (ref 0–100)
LDLC/HDLC SERPL: 1.27 {RATIO}
LYMPHOCYTES # BLD AUTO: 2.67 10*3/MM3 (ref 0.7–3.1)
LYMPHOCYTES NFR BLD AUTO: 37.6 % (ref 19.6–45.3)
MCH RBC QN AUTO: 30 PG (ref 26.6–33)
MCHC RBC AUTO-ENTMCNC: 34.5 G/DL (ref 31.5–35.7)
MCV RBC AUTO: 86.8 FL (ref 79–97)
MONOCYTES # BLD AUTO: 0.65 10*3/MM3 (ref 0.1–0.9)
MONOCYTES NFR BLD AUTO: 9.2 % (ref 5–12)
NEUTROPHILS NFR BLD AUTO: 3.56 10*3/MM3 (ref 1.7–7)
NEUTROPHILS NFR BLD AUTO: 50.1 % (ref 42.7–76)
NRBC BLD AUTO-RTO: 0 /100 WBC (ref 0–0.2)
PLATELET # BLD AUTO: 246 10*3/MM3 (ref 140–450)
PMV BLD AUTO: 9.8 FL (ref 6–12)
POTASSIUM SERPL-SCNC: 5.2 MMOL/L (ref 3.5–5.2)
PROT SERPL-MCNC: 8 G/DL (ref 6–8.5)
PSA SERPL-MCNC: 2.16 NG/ML (ref 0–4)
RBC # BLD AUTO: 4.77 10*6/MM3 (ref 4.14–5.8)
SODIUM SERPL-SCNC: 138 MMOL/L (ref 136–145)
T4 FREE SERPL-MCNC: 1.12 NG/DL (ref 0.93–1.7)
TRIGL SERPL-MCNC: 99 MG/DL (ref 0–150)
TSH SERPL DL<=0.05 MIU/L-ACNC: 2.28 UIU/ML (ref 0.27–4.2)
VIT B12 BLD-MCNC: 497 PG/ML (ref 211–946)
VLDLC SERPL-MCNC: 19 MG/DL (ref 5–40)
WBC NRBC COR # BLD: 7.1 10*3/MM3 (ref 3.4–10.8)

## 2023-03-31 PROCEDURE — 84443 ASSAY THYROID STIM HORMONE: CPT | Performed by: INTERNAL MEDICINE

## 2023-03-31 PROCEDURE — 85025 COMPLETE CBC W/AUTO DIFF WBC: CPT | Performed by: INTERNAL MEDICINE

## 2023-03-31 PROCEDURE — G0103 PSA SCREENING: HCPCS | Performed by: INTERNAL MEDICINE

## 2023-03-31 PROCEDURE — 82607 VITAMIN B-12: CPT | Performed by: INTERNAL MEDICINE

## 2023-03-31 PROCEDURE — 3078F DIAST BP <80 MM HG: CPT | Performed by: INTERNAL MEDICINE

## 2023-03-31 PROCEDURE — 82306 VITAMIN D 25 HYDROXY: CPT | Performed by: INTERNAL MEDICINE

## 2023-03-31 PROCEDURE — 80061 LIPID PANEL: CPT | Performed by: INTERNAL MEDICINE

## 2023-03-31 PROCEDURE — 99214 OFFICE O/P EST MOD 30 MIN: CPT | Performed by: INTERNAL MEDICINE

## 2023-03-31 PROCEDURE — 36415 COLL VENOUS BLD VENIPUNCTURE: CPT | Performed by: INTERNAL MEDICINE

## 2023-03-31 PROCEDURE — 84439 ASSAY OF FREE THYROXINE: CPT | Performed by: INTERNAL MEDICINE

## 2023-03-31 PROCEDURE — 1160F RVW MEDS BY RX/DR IN RCRD: CPT | Performed by: INTERNAL MEDICINE

## 2023-03-31 PROCEDURE — 82746 ASSAY OF FOLIC ACID SERUM: CPT | Performed by: INTERNAL MEDICINE

## 2023-03-31 PROCEDURE — 3075F SYST BP GE 130 - 139MM HG: CPT | Performed by: INTERNAL MEDICINE

## 2023-03-31 PROCEDURE — 1159F MED LIST DOCD IN RCRD: CPT | Performed by: INTERNAL MEDICINE

## 2023-03-31 PROCEDURE — 80053 COMPREHEN METABOLIC PANEL: CPT | Performed by: INTERNAL MEDICINE

## 2023-03-31 PROCEDURE — 83036 HEMOGLOBIN GLYCOSYLATED A1C: CPT | Performed by: INTERNAL MEDICINE

## 2023-03-31 NOTE — TELEPHONE ENCOUNTER
Call patient, tell him his chemistry panel was good perfect, his thyroid levels were normal, his cholesterol was excellent and his vitamin B, vitamin D and folic acid levels were normal, and his PSA was normal

## 2023-03-31 NOTE — PROGRESS NOTES
Chief Complaint  Shortness of Breath, Hypertension, and Hyperlipidemia    Subjective            Sajan Hermosillo presents to [facility] CARDIOLOGY  History of Present Illness    Mr. Hermosillo is here for follow-up evaluation management of dyspnea, mild CAD by previous cath, essential hypertension, mixed hyperlipidemia.  Since last visit he is doing relatively well.  He seems to be staying active.  He denies chest pain or excessive shortness of breath.  He is compliant with his medical therapy.  He is no longer taking aspirin after discussing with his PCP.    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 Gomes   • COLONOSCOPY N/A 08/22/2022    Procedure: COLONOSCOPY;  Surgeon: Robert Koo MD;  Location: [facility] ENDOSCOPY;  Service: Gastroenterology;  Laterality: N/A;  HEMORRHOIDS, DIVERTICULOSIS   • ENDOSCOPY  2019    Dr. Gomes   • ENDOSCOPY N/A 08/22/2022    Procedure: ESOPHAGOGASTRODUODENOSCOPY with biopsy;  Surgeon: Robert Koo MD;  Location: [facility] ENDOSCOPY;  Service: Gastroenterology;  Laterality: N/A;  SMALL HIATAL HERNIA   • FOOT FRACTURE SURGERY  1999   • FOOT SURGERY Right    • OTHER SURGICAL HISTORY      Artificial joints/limbs   • OTHER SURGICAL HISTORY      Metal Implants   • REPLACEMENT TOTAL KNEE Left         SOC  Social History     Socioeconomic History   • Marital status:    Tobacco Use   • Smoking status: Former     Packs/day: 2.00     Years: 10.00     Pack years: 20.00     Types: Cigarettes,  Pipe, Cigars     Start date: 1963     Quit date: 1975     Years since quittin.7   • Smokeless tobacco: Former   Vaping Use   • Vaping Use: Never used   Substance and Sexual Activity   • Alcohol use: Not Currently   • Drug use: Never   • Sexual activity: Yes     Partners: Female     Birth control/protection: Surgical, Tubal ligation         FAMHX  Family History   Problem Relation Age of Onset   • Heart disease Mother    • Diabetes Mother         unspecified type   • Arthritis Mother    • Heart disease Father    • Arthritis Father    • Heart disease Sister    • Diabetes Sister         unspecified type   • Heart disease Brother    • Arthritis Brother    • Diabetes Sister    • Colon cancer Neg Hx    • Malig Hyperthermia Neg Hx           ALLERGY  Allergies   Allergen Reactions   • Hydrocodone Rash   • Hydrocodone-Acetaminophen Rash   • Hydrocodone-Ibuprofen Rash   • Oxycodone Rash   • Penicillins Rash        MEDSCURRENT    Current Outpatient Medications:   •  atorvastatin (LIPITOR) 40 MG tablet, atorvastatin 40 mg oral tablet take 1 tablet (40 mg) by oral route once daily   Active, Disp: , Rfl:   •  carboxymethylcellulose (REFRESH PLUS) 0.5 % solution, 1 drop., Disp: , Rfl:   •  cetirizine (zyrTEC) 10 MG tablet, cetirizine 10 mg oral tablet take 1 tablet (10 mg) by oral route once daily   Active, Disp: , Rfl:   •  citalopram (CeleXA) 20 MG tablet, citalopram 20 mg oral tablet take 1 tablet (20 mg) by oral route once daily   Active, Disp: , Rfl:   •  esomeprazole (nexIUM) 40 MG capsule, Nexium 40 mg oral capsule,delayed release(DR/EC) take 1 capsule (40 mg) by oral route once daily   Active, Disp: , Rfl:   •  fluticasone (FLONASE) 50 MCG/ACT nasal spray, fluticasone 50 mcg/actuation nasal spray,suspension spray 1 - 2 sprays (50 - 100 mcg) in each nostril by intranasal route once daily as needed   Active, Disp: , Rfl:   •  insulin glargine (Lantus) 100 UNIT/ML injection, 50 Units Daily., Disp: , Rfl:   •   "ketoconazole (NIZORAL) 2 % cream, ketoconazole 2 % topical cream apply to the affected area(s) by topical route once daily   Suspended, Disp: , Rfl:   •  lisinopril (PRINIVIL,ZESTRIL) 20 MG tablet, lisinopril 20 mg oral tablet take 1 tablet (20 mg) by oral route once daily   Active, Disp: , Rfl:   •  metoprolol succinate XL (TOPROL-XL) 50 MG 24 hr tablet, Toprol XL 50 mg oral tablet extended release 24 hr take 1 tablet (50 mg) by oral route once daily   Active, Disp: , Rfl:   •  metroNIDAZOLE (METROGEL) 1 % gel, , Disp: , Rfl:   •  montelukast (SINGULAIR) 10 MG tablet, montelukast 10 mg oral tablet take 1 tablet (10 mg) by oral route once daily in the evening   Active, Disp: , Rfl:   •  nitroglycerin (NITROSTAT) 0.4 MG SL tablet, nitroglycerin 0.4 mg sublingual tablet, sublingual place 1 tablet (0.4 mg) by buccal route at the first sign of an attack; no more than 3 tabs are recommended within a 15 minute period.   Active, Disp: , Rfl:   •  Semaglutide, 1 MG/DOSE, (OZEMPIC) 2 MG/1.5ML solution pen-injector, Inject  under the skin into the appropriate area as directed 1 (One) Time Per Week., Disp: , Rfl:   •  aspirin 81 MG EC tablet, Aspir-81 81 mg oral tablet,delayed release (DR/EC) take 1 tablet (81 mg) by oral route once daily   Active (Patient not taking: Reported on 3/31/2023), Disp: , Rfl:       Review of Systems   Cardiovascular: Negative for chest pain and dyspnea on exertion.   Respiratory: Negative for shortness of breath.         Objective     /72   Pulse 65   Ht 177.8 cm (70\")   Wt 94.3 kg (208 lb)   BMI 29.84 kg/m²       General Appearance:   · well developed  · well nourished  HENT:   · oropharynx moist  · lips not cyanotic  Neck:  · thyroid not enlarged  · supple  Respiratory:  · no respiratory distress  · normal breath sounds  · no rales  Cardiovascular:  · no jugular venous distention  · regular rhythm  · apical impulse normal  · S1 normal, S2 normal  · no S3, no S4   · no murmur  · no rub, " no thrill  · carotid pulses normal; no bruit  · pedal pulses normal  · lower extremity edema: none    Musculoskeletal:  · no clubbing of fingers.   · normocephalic, head atraumatic  Skin:   · warm, dry  Psychiatric:  · judgement and insight appropriate  · normal mood and affect      Result Review :     The following data was reviewed by: Angel Harrison MD on 03/31/2023:                      Data reviewed: Primary care records reviewed     Procedures               Assessment and Plan        ASSESSMENT:  Encounter Diagnoses   Name Primary?   • Dyspnea on exertion Yes   • Mild CAD    • Hypertension, essential    • Hyperlipemia, mixed          PLAN:    1.  Dyspnea on exertion-clinically stable.  The patient had normal stress imaging last year and mild CAD by previous catheterization.  No additional diagnostics are needed at this time.  2.  Mild CAD-he is on statin therapy.  He is no longer taking aspirin due to equivocal benefit/risk ratio and studies.  If he has obstructive CAD or a cardiovascular event in the future we will resume this.  3.  Essential hypertension-stable, continue current medical therapy  4.  Mixed hyperlipidemia-stable continue statin therapy    Follow-up annually unless problems arise          Patient was given instructions and counseling regarding his condition or for health maintenance advice. Please see specific information pulled into the AVS if appropriate.             ANJANA Harrison MD  3/31/2023    10:53 EDT

## 2023-06-28 PROBLEM — R50.9 FEBRILE ILLNESS, ACUTE: Status: ACTIVE | Noted: 2023-06-28

## 2023-06-28 PROBLEM — J18.9 PNEUMONIA OF RIGHT LOWER LOBE DUE TO INFECTIOUS ORGANISM: Status: ACTIVE | Noted: 2023-06-28

## 2023-06-28 PROBLEM — N41.0 ACUTE PROSTATITIS: Status: ACTIVE | Noted: 2023-06-28

## 2023-08-18 ENCOUNTER — OFFICE VISIT (OUTPATIENT)
Dept: PODIATRY | Facility: CLINIC | Age: 78
End: 2023-08-18
Payer: MEDICARE

## 2023-08-18 VITALS
HEIGHT: 70 IN | BODY MASS INDEX: 27.63 KG/M2 | OXYGEN SATURATION: 96 % | SYSTOLIC BLOOD PRESSURE: 127 MMHG | TEMPERATURE: 98.2 F | WEIGHT: 193 LBS | DIASTOLIC BLOOD PRESSURE: 75 MMHG | HEART RATE: 89 BPM

## 2023-08-18 DIAGNOSIS — G62.9 NEUROPATHY: ICD-10-CM

## 2023-08-18 DIAGNOSIS — G62.2 POLYNEUROPATHY DUE TO OTHER TOXIC AGENTS: ICD-10-CM

## 2023-08-18 DIAGNOSIS — M79.671 FOOT PAIN, BILATERAL: ICD-10-CM

## 2023-08-18 DIAGNOSIS — E11.8 DIABETIC FOOT: Primary | ICD-10-CM

## 2023-08-18 DIAGNOSIS — E11.9 NON-INSULIN DEPENDENT TYPE 2 DIABETES MELLITUS: ICD-10-CM

## 2023-08-18 DIAGNOSIS — Z77.098 AGENT ORANGE EXPOSURE: ICD-10-CM

## 2023-08-18 DIAGNOSIS — M79.672 FOOT PAIN, BILATERAL: ICD-10-CM

## 2023-08-18 RX ORDER — BENZONATATE 200 MG/1
CAPSULE ORAL
COMMUNITY
Start: 2023-07-31

## 2023-08-18 RX ORDER — ECHINACEA PURPUREA EXTRACT 125 MG
2 TABLET ORAL
COMMUNITY
Start: 2023-07-31 | End: 2024-07-30

## 2023-08-18 NOTE — PROGRESS NOTES
[facility] - PODIATRY    Today's Date: 08/18/23    Patient Name: Sajan Hermosillo  MRN: [id number]  CSN: [id number]  PCP: Juarez Arrington MD, Last PCP Visit:  6/28/2023  Referring Provider: No ref. provider found    SUBJECTIVE     Chief Complaint   Patient presents with    Left Foot - Follow-up, Diabetes, Annual Exam    Right Foot - Follow-up, Diabetes, Annual Exam     Blister between 4th and 5th toes noticed 7-10 days ago     HPI: Sajan Hermosillo, a 78 y.o.male, comes presents to clinic for a diabetic foot evaluation.    New, Established, New Problem: established    Location: Bilateral feet    Duration: 2013    Onset: Insidious    Nature: NIDDM    Stable, worsening, improving: Stable    Patient controlling diabetes via: Oral medications    Patient states their most recent blood glucose reading was 118.    Patient denies any fevers, chills, nausea, vomiting, shortness of breathe, nor any other constitutional signs nor symptoms.    Pt stated having burning and tingling in rearfoot b/l; mostly at night.    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    Past Medical History:   Diagnosis Date    Abdominal pain     Ankle pain     Arthritis     Fracture     Heel pain     High cholesterol     HL (hearing loss)     HTN (hypertension)     Hyperlipemia     Ingrowing toenail     Limb swelling     Neuropathy in diabetes 2021    Plantar fasciitis 2004    Pneumonia June 22 2023    Prostate disorder     Seasonal allergies     Sleep apnea     Stroke 2008    Type 2 diabetes mellitus      Past Surgical History:   Procedure Laterality Date    COLONOSCOPY  2019    Dr. Gomes    COLONOSCOPY N/A 08/22/2022    Procedure: COLONOSCOPY;  Surgeon: Robert Koo MD;  Location: [facility] ENDOSCOPY;  Service: Gastroenterology;  Laterality: N/A;  HEMORRHOIDS, DIVERTICULOSIS    ENDOSCOPY  2019    Dr. Gomes    ENDOSCOPY N/A 08/22/2022    Procedure:  ESOPHAGOGASTRODUODENOSCOPY with biopsy;  Surgeon: Robert Koo MD;  Location: [facility] ENDOSCOPY;  Service: Gastroenterology;  Laterality: N/A;  SMALL HIATAL HERNIA    FOOT FRACTURE SURGERY      FOOT SURGERY Right     JOINT REPLACEMENT      OTHER SURGICAL HISTORY      Artificial joints/limbs    OTHER SURGICAL HISTORY      Metal Implants    REPLACEMENT TOTAL KNEE Left      Family History   Problem Relation Age of Onset    Heart disease Mother     Diabetes Mother         unspecified type    Arthritis Mother     Heart disease Father     Arthritis Father     Heart disease Sister     Diabetes Sister         unspecified type    Heart disease Brother     Arthritis Brother     Diabetes Sister     Colon cancer Neg Hx     Malig Hyperthermia Neg Hx      Social History     Socioeconomic History    Marital status:    Tobacco Use    Smoking status: Former     Packs/day: 2.00     Years: 10.00     Pack years: 20.00     Types: Cigarettes, Pipe, Cigars     Start date: 1963     Quit date: 1975     Years since quittin.0     Passive exposure: Past    Smokeless tobacco: Former   Vaping Use    Vaping Use: Never used   Substance and Sexual Activity    Alcohol use: Not Currently    Drug use: Never    Sexual activity: Yes     Partners: Female     Birth control/protection: Surgical, Tubal ligation     Allergies   Allergen Reactions    Hydrocodone Rash    Hydrocodone-Acetaminophen Rash    Hydrocodone-Ibuprofen Rash    Oxycodone Rash    Penicillins Rash     Current Outpatient Medications   Medication Sig Dispense Refill    aspirin 81 MG EC tablet       atorvastatin (LIPITOR) 40 MG tablet atorvastatin 40 mg oral tablet take 1 tablet (40 mg) by oral route once daily   Active      benzonatate (TESSALON) 200 MG capsule SWALLOW 1 CAPSULE WHOLE BY MOUTH 3 TIMES A DAY AS NEEDED.DO NOT BREAK, CHEW, DISSOLVE, CUT OR CRUSH      carboxymethylcellulose (REFRESH PLUS) 0.5 % solution 1 drop.      cetirizine (zyrTEC) 10 MG  tablet cetirizine 10 mg oral tablet take 1 tablet (10 mg) by oral route once daily   Active      citalopram (CeleXA) 20 MG tablet citalopram 20 mg oral tablet take 1 tablet (20 mg) by oral route once daily   Active      doxycycline (VIBRAMYCIN) 100 MG capsule Take 1 capsule by mouth 2 (Two) Times a Day. 14 capsule 0    esomeprazole (nexIUM) 40 MG capsule Nexium 40 mg oral capsule,delayed release(DR/EC) take 1 capsule (40 mg) by oral route once daily   Active      fluticasone (FLONASE) 50 MCG/ACT nasal spray fluticasone 50 mcg/actuation nasal spray,suspension spray 1 - 2 sprays (50 - 100 mcg) in each nostril by intranasal route once daily as needed   Active      insulin glargine (Lantus) 100 UNIT/ML injection 50 Units Daily.      ketoconazole (NIZORAL) 2 % cream ketoconazole 2 % topical cream apply to the affected area(s) by topical route once daily   Suspended      lisinopril (PRINIVIL,ZESTRIL) 20 MG tablet lisinopril 20 mg oral tablet take 1 tablet (20 mg) by oral route once daily   Active      metoprolol succinate XL (TOPROL-XL) 50 MG 24 hr tablet Toprol XL 50 mg oral tablet extended release 24 hr take 1 tablet (50 mg) by oral route once daily   Active      metroNIDAZOLE (METROGEL) 1 % gel       montelukast (SINGULAIR) 10 MG tablet montelukast 10 mg oral tablet take 1 tablet (10 mg) by oral route once daily in the evening   Active      nitroglycerin (NITROSTAT) 0.4 MG SL tablet nitroglycerin 0.4 mg sublingual tablet, sublingual place 1 tablet (0.4 mg) by buccal route at the first sign of an attack; no more than 3 tabs are recommended within a 15 minute period.   Active      ondansetron ODT (ZOFRAN-ODT) 4 MG disintegrating tablet Place 1 tablet on the tongue 4 (Four) Times a Day As Needed for Nausea or Vomiting. 20 tablet 0    Semaglutide, 1 MG/DOSE, (OZEMPIC) 2 MG/1.5ML solution pen-injector Inject  under the skin into the appropriate area as directed 1 (One) Time Per Week.      sodium chloride 0.65 % nasal spray  2 sprays into the nostril(s) as directed by provider.      Ibuprofen 3 %, Gabapentin 10 %, Baclofen 2 %, lidocaine 4 %, Ketamine HCl 4 % Apply 1-2 g topically to the appropriate area as directed 3 (Three) to 4 (Four) times daily. 90 g 5     No current facility-administered medications for this visit.     Review of Systems   Constitutional: Negative.    Neurological:  Positive for numbness.   All other systems reviewed and are negative.    OBJECTIVE     Vitals:    08/18/23 0918   BP: 127/75   Pulse: 89   Temp: 98.2 øF (36.8 øC)   SpO2: 96%       Body mass index is 27.69 kg/mý.    Lab Results   Component Value Date    HGBA1C 6.80 (H) 03/31/2023       Lab Results   Component Value Date    GLUCOSE 97 07/03/2023    CALCIUM 10.1 07/03/2023     (L) 07/03/2023    K 4.9 07/03/2023    CO2 24.0 07/03/2023    CL 99 07/03/2023    BUN 19 07/03/2023    CREATININE 1.17 07/03/2023    BCR 16.2 07/03/2023    ANIONGAP 12.0 07/03/2023       Patient seen in no apparent distress.      PHYSICAL EXAM:     Foot/Ankle Exam    GENERAL  Diabetic foot exam performed    Appearance:  elderly  Orientation:  AAOx3  Affect:  appropriate  Gait:  unimpaired  Assistance:  independent  Right shoe gear: diabetic shoe  Left shoe gear: diabetic shoe    VASCULAR     Right Foot Vascularity   Normal vascular exam    Dorsalis pedis:  2+  Posterior tibial:  2+  Skin temperature:  warm  Edema grading:  None  CFT:  < 3 seconds  Pedal hair growth:  Absent  Varicosities:  none     Left Foot Vascularity   Normal vascular exam    Dorsalis pedis:  2+  Posterior tibial:  2+  Skin temperature:  warm  Edema grading:  None  CFT:  < 3 seconds  Pedal hair growth:  Absent  Varicosities:  none     NEUROLOGIC     Right Foot Neurologic   Light touch sensation: diminished  Vibratory sensation: diminished  Hot/Cold sensation: diminished  Protective Sensation using [place]-Krishna Monofilament:   Sites intact: 6  Sites tested: 10     Left Foot Neurologic   Light touch  sensation: diminished  Vibratory sensation: diminished  Hot/Cold sensation:  diminished  Protective Sensation using [place]-Krishna Monofilament:   Sites intact: 6  Sites tested: 10    MUSCULOSKELETAL     Left Foot Musculoskeletal   Hallux limitus: Yes      MUSCLE STRENGTH     Right Foot Muscle Strength   Foot dorsiflexion:  4  Foot plantar flexion:  4  Foot inversion:  4  Foot eversion:  4     Left Foot Muscle Strength   Foot dorsiflexion:  4  Foot plantar flexion:  4  Foot inversion:  4  Foot eversion:  4    RANGE OF MOTION     Right Foot Range of Motion   Foot and ankle ROM within normal limits    Ankle dorsiflexion: Right ankle active dorsiflexion: No range of motion of Chopart's and subtalar joints consistent with past surgical history of fusions of these joints.     Left Foot Range of Motion   Foot and ankle ROM within normal limits      DERMATOLOGIC      Right Foot Dermatologic   Skin  Right foot skin is intact.      Left Foot Dermatologic   Skin  Left foot skin is intact.       ASSESSMENT/PLAN     Diagnoses and all orders for this visit:    1. Diabetic foot (Primary)    2. Non-insulin dependent type 2 diabetes mellitus    3. Neuropathy  -     Ibuprofen 3 %, Gabapentin 10 %, Baclofen 2 %, lidocaine 4 %, Ketamine HCl 4 %; Apply 1-2 g topically to the appropriate area as directed 3 (Three) to 4 (Four) times daily.  Dispense: 90 g; Refill: 5    4. Foot pain, bilateral    5. 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    Comprehensive lower extremity examination and evaluation was performed.    Discussed findings and treatment plan including risks, benefits, and treatment options with patient in detail. Patient agreed with treatment plan.    Diabetic foot exam performed and documented this date, compliant with M required standards. Detail of findings as noted in physical exam.  Lower extremity Neurologic exam for diabetic patient performed  and documented this date, compliant with PQRS required standards. Detail of findings as noted in physical exam.  Advised patient importance of good routine lower extremity hygiene. Advised patient importance of evaluating for intact skin and pain free nail borders.  Advised patient to use mirror to evaluate plantar/ soles of feet for better visualization. Advised patient monitor and phone office to be seen if any cracking to skin, open lesions, painful nail borders or if nails become elongated prior to next visit. Advised patient importance of daily cleansing of lower extremities, followed by good skin cream to maintain normal hydration of skin. Also advised patient importance of close daily monitoring of blood sugar. Advised to regulate diet and medications to maintain control of blood sugar in optimal range. Contact primary care provider if difficulties maintaining blood sugar levels.  Advised Patient of presence of Diabetes Mellitus condition.  Advised Patient risk of progression and worsening or improvement, then return of condition.  Will monitor condition for any change in future. Treat with most appropriate treatment pending status of condition.  Counseled and advised patient extensively on nature and ramifications of diabetes. Standard instructions given to patient for good diabetic foot care and maintenance. Advised importance of careful monitoring to avoid break down and complications secondary to diabetes. Advised patient importance of strict maintenance of blood sugar control. Advised patient of possible ominous results from neglect of condition, i.e.: amputation/ loss of digits, feet and legs, or even death.  Patient states understands counseling, will monitor closely, continue good hygiene and routine diabetic foot care. Patient will contact office is questions or problems.      An After Visit Summary was printed and given to the patient at discharge, including (if requested) any available  informative/educational handouts regarding diagnosis, treatment, or medications. All questions were answered to patient/family satisfaction. Should symptoms fail to improve or worsen they agree to call or return to clinic or to go to the Emergency Department. Discussed the importance of following up with any needed screening tests/labs/specialist appointments and any requested follow-up recommended by me today. Importance of maintaining follow-up discussed and patient accepts that missed appointments can delay diagnosis and potentially lead to worsening of conditions.    Return in about 1 year (around 8/18/2024) for Podiatry Diabetic Foot Exam., or sooner if acute issues arise.    This document has been electronically signed by Jeff Gross DPM on August 18, 2023 09:37 EDT

## 2023-09-08 ENCOUNTER — TRANSCRIBE ORDERS (OUTPATIENT)
Dept: SLEEP MEDICINE | Facility: HOSPITAL | Age: 78
End: 2023-09-08
Payer: OTHER GOVERNMENT

## 2023-09-08 DIAGNOSIS — G47.33 OBSTRUCTIVE SLEEP APNEA (ADULT) (PEDIATRIC): Primary | ICD-10-CM

## 2023-09-25 ENCOUNTER — LAB (OUTPATIENT)
Dept: INTERNAL MEDICINE | Facility: CLINIC | Age: 78
End: 2023-09-25

## 2023-09-25 ENCOUNTER — TELEPHONE (OUTPATIENT)
Dept: INTERNAL MEDICINE | Facility: CLINIC | Age: 78
End: 2023-09-25

## 2023-09-25 DIAGNOSIS — Z12.5 SCREENING PSA (PROSTATE SPECIFIC ANTIGEN): ICD-10-CM

## 2023-09-25 DIAGNOSIS — Z00.00 ENCOUNTER FOR SUBSEQUENT ANNUAL WELLNESS VISIT (AWV) IN MEDICARE PATIENT: ICD-10-CM

## 2023-09-25 DIAGNOSIS — E55.9 VITAMIN D DEFICIENCY: ICD-10-CM

## 2023-09-25 DIAGNOSIS — K21.9 GASTROESOPHAGEAL REFLUX DISEASE WITHOUT ESOPHAGITIS: ICD-10-CM

## 2023-09-25 DIAGNOSIS — E78.2 MIXED HYPERLIPIDEMIA: ICD-10-CM

## 2023-09-25 DIAGNOSIS — E11.40 TYPE 2 DIABETES MELLITUS WITH DIABETIC NEUROPATHY, WITH LONG-TERM CURRENT USE OF INSULIN: ICD-10-CM

## 2023-09-25 DIAGNOSIS — I10 HYPERTENSION, ESSENTIAL: ICD-10-CM

## 2023-09-25 DIAGNOSIS — Z00.00 PHYSICAL EXAM, ANNUAL: ICD-10-CM

## 2023-09-25 DIAGNOSIS — Z79.4 TYPE 2 DIABETES MELLITUS WITH DIABETIC NEUROPATHY, WITH LONG-TERM CURRENT USE OF INSULIN: ICD-10-CM

## 2023-09-25 DIAGNOSIS — G25.81 RLS (RESTLESS LEGS SYNDROME): ICD-10-CM

## 2023-09-25 LAB
ALBUMIN SERPL-MCNC: 4.3 G/DL (ref 3.5–5.2)
ALBUMIN/GLOB SERPL: 1.3 G/DL
ALP SERPL-CCNC: 102 U/L (ref 39–117)
ALT SERPL W P-5'-P-CCNC: 21 U/L (ref 1–41)
ANION GAP SERPL CALCULATED.3IONS-SCNC: 9.4 MMOL/L (ref 5–15)
AST SERPL-CCNC: 19 U/L (ref 1–40)
BILIRUB SERPL-MCNC: 0.5 MG/DL (ref 0–1.2)
BUN SERPL-MCNC: 18 MG/DL (ref 8–23)
BUN/CREAT SERPL: 16.8 (ref 7–25)
CALCIUM SPEC-SCNC: 9.7 MG/DL (ref 8.6–10.5)
CHLORIDE SERPL-SCNC: 104 MMOL/L (ref 98–107)
CO2 SERPL-SCNC: 23.6 MMOL/L (ref 22–29)
CREAT SERPL-MCNC: 1.07 MG/DL (ref 0.76–1.27)
EGFRCR SERPLBLD CKD-EPI 2021: 71.9 ML/MIN/1.73
GLOBULIN UR ELPH-MCNC: 3.3 GM/DL
GLUCOSE SERPL-MCNC: 105 MG/DL (ref 65–99)
POTASSIUM SERPL-SCNC: 5.1 MMOL/L (ref 3.5–5.2)
PROT SERPL-MCNC: 7.6 G/DL (ref 6–8.5)
SODIUM SERPL-SCNC: 137 MMOL/L (ref 136–145)

## 2023-09-25 PROCEDURE — 80053 COMPREHEN METABOLIC PANEL: CPT | Performed by: INTERNAL MEDICINE

## 2023-09-25 PROCEDURE — 36415 COLL VENOUS BLD VENIPUNCTURE: CPT | Performed by: INTERNAL MEDICINE

## 2023-09-25 NOTE — TELEPHONE ENCOUNTER
Caller: Sajan Hermosillo    Relationship: Self    Best call back number: [phone]     What orders are you requesting (i.e. lab or imaging): LABS    In what timeframe would the patient need to come in: 9/25/23    Where will you receive your lab/imaging services: OFFICE    Additional notes: PATIENT STATED DR. SNELL SAID HE WANTED HIM TO HAVE LABS DONE BEFORE HIS APPOINTMENT ON 9/27/23    PLEASE CALL AND ADVISE

## 2023-09-27 ENCOUNTER — OFFICE VISIT (OUTPATIENT)
Dept: INTERNAL MEDICINE | Facility: CLINIC | Age: 78
End: 2023-09-27
Payer: MEDICARE

## 2023-09-27 VITALS
HEIGHT: 70 IN | OXYGEN SATURATION: 98 % | BODY MASS INDEX: 27.77 KG/M2 | TEMPERATURE: 98.2 F | HEART RATE: 64 BPM | WEIGHT: 194 LBS | SYSTOLIC BLOOD PRESSURE: 144 MMHG | DIASTOLIC BLOOD PRESSURE: 81 MMHG

## 2023-09-27 DIAGNOSIS — K21.9 GASTROESOPHAGEAL REFLUX DISEASE WITHOUT ESOPHAGITIS: Primary | ICD-10-CM

## 2023-09-27 DIAGNOSIS — Z12.5 SCREENING PSA (PROSTATE SPECIFIC ANTIGEN): ICD-10-CM

## 2023-09-27 DIAGNOSIS — E55.9 VITAMIN D DEFICIENCY: ICD-10-CM

## 2023-09-27 DIAGNOSIS — R01.1 HEART MURMUR: ICD-10-CM

## 2023-09-27 DIAGNOSIS — I10 HYPERTENSION, ESSENTIAL: ICD-10-CM

## 2023-09-27 DIAGNOSIS — E78.2 MIXED HYPERLIPIDEMIA: ICD-10-CM

## 2023-09-27 DIAGNOSIS — Z79.4 TYPE 2 DIABETES MELLITUS WITH DIABETIC NEUROPATHY, WITH LONG-TERM CURRENT USE OF INSULIN: ICD-10-CM

## 2023-09-27 DIAGNOSIS — G25.81 RLS (RESTLESS LEGS SYNDROME): ICD-10-CM

## 2023-09-27 DIAGNOSIS — J30.1 ALLERGIC RHINITIS DUE TO POLLEN, UNSPECIFIED SEASONALITY: ICD-10-CM

## 2023-09-27 DIAGNOSIS — E11.40 TYPE 2 DIABETES MELLITUS WITH DIABETIC NEUROPATHY, WITH LONG-TERM CURRENT USE OF INSULIN: ICD-10-CM

## 2023-09-27 DIAGNOSIS — E87.1 HYPONATREMIA: ICD-10-CM

## 2023-09-27 LAB — HBA1C MFR BLD: 6 % (ref 4.8–5.6)

## 2023-09-27 PROCEDURE — 3079F DIAST BP 80-89 MM HG: CPT | Performed by: INTERNAL MEDICINE

## 2023-09-27 PROCEDURE — 83036 HEMOGLOBIN GLYCOSYLATED A1C: CPT | Performed by: INTERNAL MEDICINE

## 2023-09-27 PROCEDURE — 99214 OFFICE O/P EST MOD 30 MIN: CPT | Performed by: INTERNAL MEDICINE

## 2023-09-27 PROCEDURE — 3077F SYST BP >= 140 MM HG: CPT | Performed by: INTERNAL MEDICINE

## 2023-09-27 PROCEDURE — 1170F FXNL STATUS ASSESSED: CPT | Performed by: INTERNAL MEDICINE

## 2023-09-27 PROCEDURE — 36415 COLL VENOUS BLD VENIPUNCTURE: CPT | Performed by: INTERNAL MEDICINE

## 2023-09-27 NOTE — PROGRESS NOTES
"CHIEF COMPLAINT/ HPI:  Hyperlipidemia (Routine follow up, Lab follow up. No concerns at this time. )    Patient is here to follow-up with reflux disease, PTSD, hypertension, diabetes says he is doing well otherwise, he is not taking a lot of the medications currently, [facility],          Objective   Vital Signs  Vitals:    09/27/23 1204   BP: 144/81   Pulse: 64   Temp: 98.2 °F (36.8 °C)   SpO2: 98%   Weight: 88 kg (194 lb)   Height: 177.8 cm (70\")      Body mass index is 27.84 kg/m².  Review of Systems   Constitutional: Negative.    HENT: Negative.     Eyes: Negative.    Respiratory: Negative.     Cardiovascular: Negative.    Gastrointestinal: Negative.    Endocrine: Negative.    Genitourinary: Negative.    Musculoskeletal: Negative.    Allergic/Immunologic: Negative.    Neurological: Negative.    Hematological: Negative.    Psychiatric/Behavioral: Negative.      Physical Exam  Constitutional:       General: He is not in acute distress.     Appearance: Normal appearance.   HENT:      Head: Normocephalic.      Mouth/Throat:      Mouth: Mucous membranes are moist.   Eyes:      Conjunctiva/sclera: Conjunctivae normal.      Pupils: Pupils are equal, round, and reactive to light.   Cardiovascular:      Rate and Rhythm: Normal rate and regular rhythm.      Pulses: Normal pulses.      Heart sounds: Murmur (1/6 to 2/6 systolic murmur right upper left upper sternal border) heard.   Pulmonary:      Effort: Pulmonary effort is normal.      Breath sounds: Normal breath sounds.   Abdominal:      General: Abdomen is flat. Bowel sounds are normal.      Palpations: Abdomen is soft.   Musculoskeletal:         General: No swelling. Normal range of motion.      Cervical back: Neck supple.   Skin:     General: Skin is warm and dry.      Coloration: Skin is not jaundiced.   Neurological:      General: No focal deficit present.      Mental Status: He is alert and oriented to person, place, and time. Mental status is " at baseline.   Psychiatric:         Mood and Affect: Mood normal.         Behavior: Behavior normal.         Thought Content: Thought content normal.         Judgment: Judgment normal.      Result Review :   No results found for: PROBNP, BNP  CMP          6/28/2023    11:25 7/3/2023    09:58 9/25/2023    10:04   CMP   Glucose 95  97  105    BUN 17  19  18    Creatinine 0.99  1.17  1.07    EGFR 78.9  64.6  71.9    Sodium 129  135  137    Potassium 4.4  4.9  5.1    Chloride 95  99  104    Calcium 9.9  10.1  9.7    Total Protein   7.6    Albumin   4.3    Globulin   3.3    Total Bilirubin   0.5    Alkaline Phosphatase   102    AST (SGOT)   19    ALT (SGPT)   21    Albumin/Globulin Ratio   1.3    BUN/Creatinine Ratio 17.2  16.2  16.8    Anion Gap 10.0  12.0  9.4      CBC w/diff          3/31/2023    09:02 6/21/2023    08:36 6/28/2023    11:25   CBC w/Diff   WBC 7.10  8.65  7.62    RBC 4.77  4.40  4.68    Hemoglobin 14.3  13.3  13.8    Hematocrit 41.4  38.4  40.5    MCV 86.8  87.3  86.5    MCH 30.0  30.2  29.5    MCHC 34.5  34.6  34.1    RDW 13.5  13.7  13.0    Platelets 246  188  377    Neutrophil Rel % 50.1  74.2  60.6    Immature Granulocyte Rel % 0.3  0.3  0.5    Lymphocyte Rel % 37.6  14.2  26.0    Monocyte Rel % 9.2  9.4  8.5    Eosinophil Rel % 2.4  1.6  3.9    Basophil Rel % 0.4  0.3  0.5       Lipid Panel          3/31/2023    09:02   Lipid Panel   Total Cholesterol 122    Triglycerides 99    HDL Cholesterol 45    VLDL Cholesterol 19    LDL Cholesterol  58    LDL/HDL Ratio 1.27       Lab Results   Component Value Date    TSH 2.280 03/31/2023    TSH 2.300 08/07/2019      Lab Results   Component Value Date    FREET4 1.12 03/31/2023    FREET4 1.1 08/07/2019      A1C Last 3 Results          3/31/2023    09:03   HGBA1C Last 3 Results   Hemoglobin A1C 6.80       PSA          3/31/2023    09:02 6/28/2023    11:25   PSA   PSA 2.160  2.570                     Visit Diagnoses:    ICD-10-CM ICD-9-CM   1. Gastroesophageal  reflux disease without esophagitis  K21.9 530.81   2. Allergic rhinitis due to pollen, unspecified seasonality  J30.1 477.0   3. Hyponatremia  E87.1 276.1   4. Vitamin D deficiency  E55.9 268.9   5. RLS (restless legs syndrome)  G25.81 333.94   6. Screening PSA (prostate specific antigen)  Z12.5 V76.44   7. Hypertension, essential  I10 401.9   8. Mixed hyperlipidemia  E78.2 272.2   9. Type 2 diabetes mellitus with diabetic neuropathy, with long-term current use of insulin  E11.40 250.60    Z79.4 357.2     V58.67   10. Heart murmur  R01.1 785.2       Assessment and Plan   Diagnoses and all orders for this visit:    1. Gastroesophageal reflux disease without esophagitis (Primary)  -     Comprehensive Metabolic Panel; Future  -     CBC & Differential; Future  -     Hemoglobin A1c; Future  -     Lipid Panel; Future  -     Hemoglobin A1c; Future  -     MicroAlbumin, Urine, Random - Urine, Clean Catch; Future    2. Allergic rhinitis due to pollen, unspecified seasonality  -     Comprehensive Metabolic Panel; Future  -     CBC & Differential; Future  -     Hemoglobin A1c; Future  -     Lipid Panel; Future  -     Hemoglobin A1c; Future  -     MicroAlbumin, Urine, Random - Urine, Clean Catch; Future    3. Hyponatremia  -     Comprehensive Metabolic Panel; Future  -     CBC & Differential; Future  -     Hemoglobin A1c; Future  -     Lipid Panel; Future  -     Hemoglobin A1c; Future  -     MicroAlbumin, Urine, Random - Urine, Clean Catch; Future    4. Vitamin D deficiency  -     Comprehensive Metabolic Panel; Future  -     CBC & Differential; Future  -     Hemoglobin A1c; Future  -     Lipid Panel; Future  -     Hemoglobin A1c; Future  -     MicroAlbumin, Urine, Random - Urine, Clean Catch; Future    5. RLS (restless legs syndrome)  -     Comprehensive Metabolic Panel; Future  -     CBC & Differential; Future  -     Hemoglobin A1c; Future  -     Lipid Panel; Future  -     Hemoglobin A1c; Future  -     MicroAlbumin, Urine,  Random - Urine, Clean Catch; Future    6. Screening PSA (prostate specific antigen)  -     Comprehensive Metabolic Panel; Future  -     CBC & Differential; Future  -     Hemoglobin A1c; Future  -     Lipid Panel; Future  -     Hemoglobin A1c; Future  -     MicroAlbumin, Urine, Random - Urine, Clean Catch; Future    7. Hypertension, essential  -     Comprehensive Metabolic Panel; Future  -     CBC & Differential; Future  -     Hemoglobin A1c; Future  -     Lipid Panel; Future  -     Hemoglobin A1c; Future  -     MicroAlbumin, Urine, Random - Urine, Clean Catch; Future    8. Mixed hyperlipidemia  -     Comprehensive Metabolic Panel; Future  -     CBC & Differential; Future  -     Hemoglobin A1c; Future  -     Lipid Panel; Future  -     Hemoglobin A1c; Future  -     MicroAlbumin, Urine, Random - Urine, Clean Catch; Future    9. Type 2 diabetes mellitus with diabetic neuropathy, with long-term current use of insulin  -     Comprehensive Metabolic Panel; Future  -     CBC & Differential; Future  -     Hemoglobin A1c; Future  -     Lipid Panel; Future  -     Hemoglobin A1c; Future  -     MicroAlbumin, Urine, Random - Urine, Clean Catch; Future    10. Heart murmur  -     Adult Transthoracic Echo Complete W/ Cont if Necessary Per Protocol; Future    Heart murmur work-up discussed with patient--we will order echo for next visit, as of September 2023    colonoscopy/EGD,, Dr. Shepard, August 2022, diverticulosis, internal hemorrhoids,, hiatal hernia otherwise upper GI unremarkable    Chest pain --cardiac stress testing--May 4, 2022,- myocardial perfusion imaging showed normal myocardial perfusion study no evidence of ischemia May 3, 2022, low risk study normal EKG stress test, normal ejection fraction, previously seen Dr. Harrison    abd pain , wgt loss, back pain, r/o pancreatic isssues, pud , PT HAD CT scan abdomen and pelvis August 2019,---did not show any evidence of pancreatitis or intra-abdominal pathology, ------S/P EGD  WITH DR OLSON OCT 2019, -he has some small nodular areas in both lung bases and some interstitial changes -- CT scan of the chest March 2020,  reticular-nodular pattern in the lung bases, NO specific infiltrates or groundglass opacities are noted, F/U CT CHEST SEPT 2020--NO CHANGES, NO SIGNIFICANT OR worrisome lesions per radiologist, chronic lung changes noted,     anemia ----- stable hemoglobin 13.4 August 2022 continues iron sulfate, daily    Hyponatremia previously resolved after stopping Maxide September 25, 2023    Type 2 diabetes --cont , Lantus 40 units daily,, metformin 500 mg daily ozempic 1mg q weekly,    Hypertension -continues-lisinopril 20 mg daily,      Elevated cholesterol-, continues Lipitor 10 mg daily    Gastroesophageal reflux --- continues Nexium 40 mg daily    Restless leg syndrome 10 years--- continues Mirapex,     allergies,--- continue Zyrtec 10 mg daily    Previous left total knee arthroplasty, right ankle fusion foot fusion    Depression, PTSD by history ----currently not taking any medications    [facility]    ed on prn sildenafil    Follow Up   Return in about 6 months (around 3/27/2024).  Patient was given instructions and counseling regarding his condition or for health maintenance advice. Please see specific information pulled into the AVS if appropriate.       Answers submitted by the patient for this visit:  Primary Reason for Visit (Submitted on 9/27/2023)  What is the primary reason for your visit?: Other  Other (Submitted on 9/27/2023)  Please describe your symptoms.: none  Have you had these symptoms before?: No  How long have you been having these symptoms?: 1-4 days

## 2023-10-24 ENCOUNTER — HOSPITAL ENCOUNTER (OUTPATIENT)
Dept: SLEEP MEDICINE | Facility: HOSPITAL | Age: 78
End: 2023-10-24
Payer: MEDICARE

## 2023-10-24 DIAGNOSIS — G47.33 OBSTRUCTIVE SLEEP APNEA (ADULT) (PEDIATRIC): ICD-10-CM

## 2023-10-24 PROCEDURE — 95810 POLYSOM 6/> YRS 4/> PARAM: CPT

## 2023-11-02 ENCOUNTER — TELEPHONE (OUTPATIENT)
Dept: SLEEP MEDICINE | Facility: HOSPITAL | Age: 78
End: 2023-11-02
Payer: OTHER GOVERNMENT

## 2024-03-14 ENCOUNTER — OFFICE VISIT (OUTPATIENT)
Dept: PODIATRY | Facility: CLINIC | Age: 79
End: 2024-03-14
Payer: OTHER GOVERNMENT

## 2024-03-14 VITALS
BODY MASS INDEX: 29.56 KG/M2 | OXYGEN SATURATION: 100 % | TEMPERATURE: 98.4 F | HEART RATE: 74 BPM | SYSTOLIC BLOOD PRESSURE: 144 MMHG | DIASTOLIC BLOOD PRESSURE: 77 MMHG | WEIGHT: 206 LBS

## 2024-03-14 DIAGNOSIS — G62.9 NEUROPATHY: ICD-10-CM

## 2024-03-14 DIAGNOSIS — M20.5X2 HALLUX LIMITUS OF LEFT FOOT: ICD-10-CM

## 2024-03-14 DIAGNOSIS — E11.9 NON-INSULIN DEPENDENT TYPE 2 DIABETES MELLITUS: ICD-10-CM

## 2024-03-14 DIAGNOSIS — Z77.098 AGENT ORANGE EXPOSURE: ICD-10-CM

## 2024-03-14 DIAGNOSIS — G62.2 POLYNEUROPATHY DUE TO OTHER TOXIC AGENTS: ICD-10-CM

## 2024-03-14 DIAGNOSIS — E11.8 DIABETIC FOOT: Primary | ICD-10-CM

## 2024-03-14 NOTE — PROGRESS NOTES
Answers submitted by the patient for this visit:  Primary Reason for Visit (Submitted on 3/7/2024)  What is the primary reason for your visit?: Other  Other (Submitted on 3/7/2024)  Please describe your symptoms.:  arthritis pain left foot  Have you had these symptoms before?: No  How long have you been having these symptoms?: Greater than 2 weeks  Please list any medications you are currently taking for this condition.: ibuprofen 800mg      [facility] POD[facility]AT[facility]    Today's Date: 03/14/24    Patient Name: Sajan Hermosillo  MRN: [id number]  CSN: [id number]  PCP: Juarez Arrington MD, Last PCP Visit:  6/28/2023  Referring Provider: Juarez Arrington,*    SUBJECTIVE     Chief Complaint   Patient presents with    Left Foot - Follow-up, Pain, Diabetes      Pain left foot, top of foot.  [facility]    Right Foot - Follow-up, Diabetes     HPI: Sajan Hermosillo, a 79 y.o.male, comes presents to clinic for:    New, Established, New Problem: New problem  Location: Left first MPJ  Duration: Greater than 1 month  Onset: Insidious  Nature: Left, painful joint  Stable, worsening, improving: Worsening  Aggravating factors:  Patient relates pain is aggravated by shoe gear and ambulation.    Previous Treatment: Shoe gear, change activities, avoiding activities    Patient denies any fevers, chills, nausea, vomiting, shortness of breathe, nor any other constitutional signs nor symptoms.    Patient controlling diabetes via: Oral medications    Patient states their most recent blood glucose reading was 113    Patient denies any fevers, chills, nausea, vomiting, shortness of breathe, nor any other constitutional signs nor symptoms.    Patient relates exposure to Agent Orange in [facility] during his service during the [place] [employer].    Past Medical History:   Diagnosis Date    Abdominal pain     Ankle pain     Arthritis     Fracture     Heel pain     High cholesterol     HL (hearing loss)     HTN (hypertension)      Hyperlipemia     Ingrowing toenail     Limb swelling     Neuropathy in diabetes     Plantar fasciitis 2004    Pneumonia 2023    Prostate disorder     Seasonal allergies     Sleep apnea     Stroke 2008    Type 2 diabetes mellitus      Past Surgical History:   Procedure Laterality Date    COLONOSCOPY      Dr. Gomes    COLONOSCOPY N/A 2022    Procedure: COLONOSCOPY;  Surgeon: Robert Koo MD;  Location: [facility];  Service: Gastroenterology;  Laterality: N/A;  HEMORRHOIDS, DIVERTICULOSIS    ENDOSCOPY      Dr. Gomes    ENDOSCOPY N/A 2022    Procedure: ESOPHAGOGASTRODUODENOSCOPY with biopsy;  Surgeon: Robert Koo MD;  Location: [facility] ENDOSCOPY;  Service: Gastroenterology;  Laterality: N/A;  SMALL HIATAL HERNIA    FOOT FRACTURE SURGERY      FOOT SURGERY Right     JOINT REPLACEMENT      OTHER SURGICAL HISTORY      Artificial joints/limbs    OTHER SURGICAL HISTORY      Metal Implants    REPLACEMENT TOTAL KNEE Left      Family History   Problem Relation Age of Onset    Heart disease Mother     Diabetes Mother         unspecified type    Arthritis Mother     Heart disease Father     Arthritis Father     Heart disease Sister     Diabetes Sister         unspecified type    Heart disease Brother     Arthritis Brother     Diabetes Sister     Colon cancer Neg Hx     Malig Hyperthermia Neg Hx      Social History     Socioeconomic History    Marital status:    Tobacco Use    Smoking status: Former     Current packs/day: 0.00     Average packs/day: 2.0 packs/day for 12.6 years (25.2 ttl pk-yrs)     Types: Cigarettes, Pipe, Cigars     Start date: 1963     Quit date: 1975     Years since quittin.6     Passive exposure: Past    Smokeless tobacco: Former   Vaping Use    Vaping status: Never Used   Substance and Sexual Activity    Alcohol use: Not Currently    Drug use: Never    Sexual activity: Yes     Partners: Female     Birth  control/protection: Surgical, Tubal ligation     Allergies   Allergen Reactions    Hydrocodone Rash    Hydrocodone-Acetaminophen Rash    Hydrocodone-Ibuprofen Rash    Oxycodone Rash    Penicillins Rash     Current Outpatient Medications   Medication Sig Dispense Refill    aspirin 81 MG EC tablet       atorvastatin (LIPITOR) 40 MG tablet atorvastatin 40 mg oral tablet take 1 tablet (40 mg) by oral route once daily   Active      carboxymethylcellulose (REFRESH PLUS) 0.5 % solution 1 drop.      esomeprazole (nexIUM) 40 MG capsule Nexium 40 mg oral capsule,delayed release(DR/EC) take 1 capsule (40 mg) by oral route once daily   Active      fluticasone (Flonase Allergy Relief) 50 MCG/ACT nasal spray Daily.      Ibuprofen 3 %, Gabapentin 10 %, Baclofen 2 %, lidocaine 4 %, Ketamine HCl 4 % Apply 1-2 g topically to the appropriate area as directed 3 (Three) to 4 (Four) times daily. 90 g 5    insulin glargine (Lantus) 100 UNIT/ML injection 50 Units Daily.      ketoconazole (NIZORAL) 2 % cream ketoconazole 2 % topical cream apply to the affected area(s) by topical route once daily   Suspended      lisinopril (PRINIVIL,ZESTRIL) 20 MG tablet lisinopril 20 mg oral tablet take 1 tablet (20 mg) by oral route once daily   Active      nitroglycerin (NITROSTAT) 0.4 MG SL tablet nitroglycerin 0.4 mg sublingual tablet, sublingual place 1 tablet (0.4 mg) by buccal route at the first sign of an attack; no more than 3 tabs are recommended within a 15 minute period.   Active      ondansetron ODT (ZOFRAN-ODT) 4 MG disintegrating tablet Place 1 tablet on the tongue 4 (Four) Times a Day As Needed for Nausea or Vomiting. 20 tablet 0    pramipexole (MIRAPEX) 0.5 MG tablet Daily.      Semaglutide, 1 MG/DOSE, (OZEMPIC) 2 MG/1.5ML solution pen-injector Inject  under the skin into the appropriate area as directed 1 (One) Time Per Week.       No current facility-administered medications for this visit.     Review of Systems   Constitutional:  Negative.    Musculoskeletal:         Left 1st MPJ   Neurological:  Positive for numbness.   All other systems reviewed and are negative.      OBJECTIVE     Vitals:    03/14/24 0732   BP: 144/77   Pulse: 74   Temp: 98.4 °F (36.9 °C)   SpO2: 100%       Body mass index is 29.56 kg/m².    Lab Results   Component Value Date    HGBA1C 6.00 (H) 09/27/2023       Lab Results   Component Value Date    GLUCOSE 105 (H) 09/25/2023    CALCIUM 9.7 09/25/2023     09/25/2023    K 5.1 09/25/2023    CO2 23.6 09/25/2023     09/25/2023    BUN 18 09/25/2023    CREATININE 1.07 09/25/2023    BCR 16.8 09/25/2023    ANIONGAP 9.4 09/25/2023       Patient seen in no apparent distress.      PHYSICAL EXAM:     Foot/Ankle Exam    GENERAL  Appearance:  elderly  Orientation:  AAOx3  Affect:  appropriate  Gait:  unimpaired  Assistance:  independent  Right shoe gear: casual shoe  Left shoe gear: casual shoe    VASCULAR     Right Foot Vascularity   Normal vascular exam    Dorsalis pedis:  2+  Posterior tibial:  2+  Skin temperature:  warm  Edema grading:  None  CFT:  < 3 seconds  Pedal hair growth:  Absent  Varicosities:  none     Left Foot Vascularity   Normal vascular exam    Dorsalis pedis:  2+  Posterior tibial:  2+  Skin temperature:  warm  Edema grading:  None  CFT:  < 3 seconds  Pedal hair growth:  Absent  Varicosities:  none     NEUROLOGIC     Right Foot Neurologic   Light touch sensation: diminished  Vibratory sensation: diminished  Hot/Cold sensation: diminished  Protective Sensation using [place]-Krishna Monofilament:   Sites intact: 6  Sites tested: 10     Left Foot Neurologic   Light touch sensation: diminished  Vibratory sensation: diminished  Hot/Cold sensation:  diminished  Protective Sensation using [place]-Krishna Monofilament:   Sites intact: 6  Sites tested: 10    MUSCULOSKELETAL     Left Foot Musculoskeletal   Ecchymosis:  none  Tenderness:  MTP 1 dorsal tenderness  Hallux limitus: Yes      MUSCLE STRENGTH     Right  Foot Muscle Strength   Foot dorsiflexion:  4  Foot plantar flexion:  4  Foot inversion:  4  Foot eversion:  4     Left Foot Muscle Strength   Foot dorsiflexion:  4  Foot plantar flexion:  4  Foot inversion:  4  Foot eversion:  4    RANGE OF MOTION     Right Foot Range of Motion   Foot and ankle ROM within normal limits    Ankle dorsiflexion: Right ankle active dorsiflexion: No range of motion of Chopart's and subtalar joints consistent with past surgical history of fusions of these joints.     Left Foot Range of Motion   Foot and ankle ROM within normal limits    1st MTP extension: decreased with pain  1st MTP flexion: decreased with pain    DERMATOLOGIC      Right Foot Dermatologic   Skin  Right foot skin is intact.      Left Foot Dermatologic   Skin  Left foot skin is intact.         ASSESSMENT/PLAN     Diagnoses and all orders for this visit:    1. Diabetic foot (Primary)    2. Agent orange exposure    3. 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 Neuropathy    6. Hallux limitus of left foot  -     Case Request; Standing  -     ceFAZolin (ANCEF) 2 g in sodium chloride 0.9 % 100 mL IVPB  -     Case Request  -     Miscellaneous DME    Other orders  -     Follow Anesthesia Guidelines / Protocol; Future  -     Follow Anesthesia Guidelines / Protocol; Standing  -     Verify NPO Status; Standing  -     Obtain informed consent (if not collected inpatient or PAT); Standing    Comprehensive lower extremity examination and evaluation was performed.    Discussed findings and treatment plan including risks, benefits, and treatment options with patient in detail. Patient agreed with treatment plan.    Prescription was written for a knee roller.    Message sent to the patient's primary care provider Dr. Boris Arrington.    Planned surgery: Medial left first MPJ fusion on 12 April 2024    The risks and benefits of the surgery were discussed with the patient.  This  discussion included possible complications of requiring further surgery, possible delayed wound healing, further surgery requiring amputation of the foot or leg, and also included the complication of death.  All the patient's questions were answered to their satisfaction.  Patient states they would like to proceed with the procedure.  Discussed with the patient at length surgical versus nonsurgical options.  Explained to the patient in detail that surgical intervention is only indicated when the level of pain is such that it negatively affects her daily life.    It was explained to the patient that surgical interventions often times do not relieve all of the pain associated with the deformity    Risks and complications associated with surgical versus nonsurgical options were explained.  The patient understands that the problem will most likely continue to evolve and surgical intervention is the only treatment plan that actually corrects the deformities.    Upon discussion of non-surgical conservative option, surgical correction, post-operative requirements along with risk and benefits of the surgery along with expected outcomes, the patient states they would like to proceed with the scheduling surgery.      The patient has decided to move forward with surgical intervention understanding all of these risks and complications.    An After Visit Summary was printed and given to the patient at discharge, including (if requested) any available informative/educational handouts regarding diagnosis, treatment, or medications. All questions were answered to patient/family satisfaction. Should symptoms fail to improve or worsen they agree to call or return to clinic or to go to the Emergency Department. Discussed the importance of following up with any needed screening tests/labs/specialist appointments and any requested follow-up recommended by me today. Importance of maintaining follow-up discussed and patient accepts that  missed appointments can delay diagnosis and potentially lead to worsening of conditions.    Return in about 1 month (around 4/16/2024) for Post Operative, Cast change, X-ray needed., or sooner if acute issues arise.    This document has been electronically signed by Jeff Gross DPM on March 14, 2024 08:13 EDT

## 2024-03-19 ENCOUNTER — TELEPHONE (OUTPATIENT)
Dept: PODIATRY | Facility: CLINIC | Age: 79
End: 2024-03-19
Payer: OTHER GOVERNMENT

## 2024-03-19 ENCOUNTER — HOSPITAL ENCOUNTER (OUTPATIENT)
Dept: CARDIOLOGY | Facility: HOSPITAL | Age: 79
Discharge: HOME | End: 2024-03-19
Admitting: INTERNAL MEDICINE
Payer: MEDICARE

## 2024-03-19 DIAGNOSIS — R01.1 HEART MURMUR: ICD-10-CM

## 2024-03-19 PROCEDURE — 93306 TTE W/DOPPLER COMPLETE: CPT

## 2024-03-19 PROCEDURE — 25010000002 SULFUR HEXAFLUORIDE MICROSPH 60.7-25 MG RECONSTITUTED SUSPENSION: Performed by: INTERNAL MEDICINE

## 2024-03-19 RX ADMIN — SULFUR HEXAFLUORIDE 5 ML: KIT at 11:22

## 2024-03-19 NOTE — TELEPHONE ENCOUNTER
Spoke with Joel at [facility] [phone] about getting patient a knee roller for after [facility]. FAXed the knee roller order with blank [facility] RFS form and copy of first page of our [facility] auth to [phone]

## 2024-03-20 LAB
AORTIC DIMENSIONLESS INDEX: 0.38 (DI)
BH CV ECHO MEAS - ACS: 1.6 CM
BH CV ECHO MEAS - AI P1/2T: 525.8 MSEC
BH CV ECHO MEAS - AO MAX PG: 30.9 MMHG
BH CV ECHO MEAS - AO MEAN PG: 16 MMHG
BH CV ECHO MEAS - AO ROOT DIAM: 3.5 CM
BH CV ECHO MEAS - AO V2 MAX: 278 CM/SEC
BH CV ECHO MEAS - AO V2 VTI: 65.9 CM
BH CV ECHO MEAS - AVA(I,D): 1.19 CM2
BH CV ECHO MEAS - EDV(CUBED): 125 ML
BH CV ECHO MEAS - EDV(MOD-SP2): 92.8 ML
BH CV ECHO MEAS - EDV(MOD-SP4): 143 ML
BH CV ECHO MEAS - EF(MOD-BP): 57.6 %
BH CV ECHO MEAS - EF(MOD-SP2): 58.6 %
BH CV ECHO MEAS - EF(MOD-SP4): 59.6 %
BH CV ECHO MEAS - ESV(CUBED): 22 ML
BH CV ECHO MEAS - ESV(MOD-SP2): 38.4 ML
BH CV ECHO MEAS - ESV(MOD-SP4): 57.8 ML
BH CV ECHO MEAS - FS: 44 %
BH CV ECHO MEAS - IVS/LVPW: 1.11 CM
BH CV ECHO MEAS - IVSD: 1 CM
BH CV ECHO MEAS - LA DIMENSION: 3.6 CM
BH CV ECHO MEAS - LAT PEAK E' VEL: 10.4 CM/SEC
BH CV ECHO MEAS - LV DIASTOLIC VOL/BSA (35-75): 67.1 CM2
BH CV ECHO MEAS - LV MASS(C)D: 169.9 GRAMS
BH CV ECHO MEAS - LV MAX PG: 5.3 MMHG
BH CV ECHO MEAS - LV MEAN PG: 3 MMHG
BH CV ECHO MEAS - LV SYSTOLIC VOL/BSA (12-30): 27.1 CM2
BH CV ECHO MEAS - LV V1 MAX: 115 CM/SEC
BH CV ECHO MEAS - LV V1 VTI: 24.9 CM
BH CV ECHO MEAS - LVIDD: 5 CM
BH CV ECHO MEAS - LVIDS: 2.8 CM
BH CV ECHO MEAS - LVOT AREA: 3.1 CM2
BH CV ECHO MEAS - LVOT DIAM: 2 CM
BH CV ECHO MEAS - LVPWD: 0.9 CM
BH CV ECHO MEAS - MED PEAK E' VEL: 8.7 CM/SEC
BH CV ECHO MEAS - MV A MAX VEL: 87.5 CM/SEC
BH CV ECHO MEAS - MV DEC SLOPE: 1176 CM/SEC2
BH CV ECHO MEAS - MV DEC TIME: 0.22 SEC
BH CV ECHO MEAS - MV E MAX VEL: 73.9 CM/SEC
BH CV ECHO MEAS - MV E/A: 0.84
BH CV ECHO MEAS - MV MEAN PG: 2 MMHG
BH CV ECHO MEAS - MV P1/2T: 28.4 MSEC
BH CV ECHO MEAS - MV V2 VTI: 32.1 CM
BH CV ECHO MEAS - MVA(P1/2T): 7.7 CM2
BH CV ECHO MEAS - MVA(VTI): 2.44 CM2
BH CV ECHO MEAS - PA V2 MAX: 135 CM/SEC
BH CV ECHO MEAS - PULM A REVS DUR: 0.14 SEC
BH CV ECHO MEAS - PULM A REVS VEL: 36.9 CM/SEC
BH CV ECHO MEAS - PULM DIAS VEL: 42.5 CM/SEC
BH CV ECHO MEAS - PULM S/D: 1.67
BH CV ECHO MEAS - PULM SYS VEL: 70.9 CM/SEC
BH CV ECHO MEAS - QP/QS: 1.01
BH CV ECHO MEAS - RV MAX PG: 5.5 MMHG
BH CV ECHO MEAS - RV V1 MAX: 117 CM/SEC
BH CV ECHO MEAS - RV V1 VTI: 22.7 CM
BH CV ECHO MEAS - RVDD: 3.5 CM
BH CV ECHO MEAS - RVOT DIAM: 2.1 CM
BH CV ECHO MEAS - SI(MOD-SP2): 25.5 ML/M2
BH CV ECHO MEAS - SI(MOD-SP4): 40 ML/M2
BH CV ECHO MEAS - SV(LVOT): 78.2 ML
BH CV ECHO MEAS - SV(MOD-SP2): 54.4 ML
BH CV ECHO MEAS - SV(MOD-SP4): 85.2 ML
BH CV ECHO MEAS - SV(RVOT): 78.6 ML
BH CV ECHO MEAS - TAPSE (>1.6): 2.15 CM
BH CV ECHO MEASUREMENTS AVERAGE E/E' RATIO: 7.74
BH CV XLRA - TDI S': 13.3 CM/SEC
IVRT: 79 MS
LEFT ATRIUM VOLUME INDEX: 20.3 ML/M2

## 2024-03-21 ENCOUNTER — TELEPHONE (OUTPATIENT)
Dept: PODIATRY | Facility: CLINIC | Age: 79
End: 2024-03-21

## 2024-03-21 PROBLEM — M20.5X2 HALLUX LIMITUS OF LEFT FOOT: Status: ACTIVE | Noted: 2024-03-14

## 2024-03-21 NOTE — TELEPHONE ENCOUNTER
Caller: LETHA    Relationship to patient: SELF    Best call back number: [phone] (home)       Chief complaint: NA    Type of visit: ARTHRODESIS METATARSALPHALANGEAL JOINT      Requested date: PATIENT WANTS TO RESCHEDULE SX FOR MAY      If rescheduling, when is the original appointment: 4/12/2024      Additional notes:NA

## 2024-03-27 ENCOUNTER — OFFICE VISIT (OUTPATIENT)
Dept: INTERNAL MEDICINE | Age: 79
End: 2024-03-27
Payer: MEDICARE

## 2024-03-27 VITALS
HEIGHT: 70 IN | OXYGEN SATURATION: 96 % | HEART RATE: 74 BPM | SYSTOLIC BLOOD PRESSURE: 136 MMHG | DIASTOLIC BLOOD PRESSURE: 81 MMHG | BODY MASS INDEX: 28.92 KG/M2 | WEIGHT: 202 LBS | TEMPERATURE: 98.2 F

## 2024-03-27 DIAGNOSIS — J30.1 ALLERGIC RHINITIS DUE TO POLLEN, UNSPECIFIED SEASONALITY: ICD-10-CM

## 2024-03-27 DIAGNOSIS — E78.2 MIXED HYPERLIPIDEMIA: ICD-10-CM

## 2024-03-27 DIAGNOSIS — E87.1 HYPONATREMIA: ICD-10-CM

## 2024-03-27 DIAGNOSIS — R01.1 HEART MURMUR: ICD-10-CM

## 2024-03-27 DIAGNOSIS — K21.9 GASTROESOPHAGEAL REFLUX DISEASE WITHOUT ESOPHAGITIS: ICD-10-CM

## 2024-03-27 DIAGNOSIS — Z00.00 MEDICARE ANNUAL WELLNESS VISIT, SUBSEQUENT: Primary | ICD-10-CM

## 2024-03-27 DIAGNOSIS — Z12.5 SCREENING PSA (PROSTATE SPECIFIC ANTIGEN): ICD-10-CM

## 2024-03-27 DIAGNOSIS — E55.9 VITAMIN D DEFICIENCY: ICD-10-CM

## 2024-03-27 DIAGNOSIS — Z79.4 TYPE 2 DIABETES MELLITUS WITH DIABETIC NEUROPATHY, WITH LONG-TERM CURRENT USE OF INSULIN: ICD-10-CM

## 2024-03-27 DIAGNOSIS — I10 HYPERTENSION, ESSENTIAL: ICD-10-CM

## 2024-03-27 DIAGNOSIS — G25.81 RLS (RESTLESS LEGS SYNDROME): ICD-10-CM

## 2024-03-27 DIAGNOSIS — E11.40 TYPE 2 DIABETES MELLITUS WITH DIABETIC NEUROPATHY, WITH LONG-TERM CURRENT USE OF INSULIN: ICD-10-CM

## 2024-03-27 NOTE — PROGRESS NOTES
"CHIEF COMPLAINT/ HPI:  Diabetes (Routine follow up, Cardiology follow up, pt had a echocardiogram. )    follow-up with reflux disease, PTSD, hypertension, diabetes says he is doing well otherwise, he is not taking a lot of the medications currently, [facility],          Objective   Vital Signs  Vitals:    03/27/24 1159   BP: 136/81   Pulse: 74   Temp: 98.2 °F (36.8 °C)   SpO2: 96%   Weight: 91.6 kg (202 lb)   Height: 177.8 cm (70\")      Body mass index is 28.98 kg/m².  Review of Systems patient's been doing well, he is active, he does a little bit a  work, no chest pain or shortness of breath  Physical Exam  Constitutional:       General: He is not in acute distress.     Appearance: Normal appearance.   HENT:      Head: Normocephalic.      Mouth/Throat:      Mouth: Mucous membranes are moist.   Eyes:      Conjunctiva/sclera: Conjunctivae normal.      Pupils: Pupils are equal, round, and reactive to light.   Cardiovascular:      Rate and Rhythm: Normal rate and regular rhythm.      Pulses: Normal pulses.      Heart sounds: Murmur (2/6 to 3/6 systolic holosystolic murmur left upper right upper sternal border) heard.   Pulmonary:      Effort: Pulmonary effort is normal.      Breath sounds: Normal breath sounds.   Abdominal:      General: Bowel sounds are normal.      Palpations: Abdomen is soft.   Musculoskeletal:         General: No swelling. Normal range of motion.      Cervical back: Neck supple.      Right lower leg: No edema.      Left lower leg: No edema.   Skin:     General: Skin is warm and dry.      Coloration: Skin is not jaundiced.   Neurological:      General: No focal deficit present.      Mental Status: He is alert and oriented to person, place, and time. Mental status is at baseline.   Psychiatric:         Mood and Affect: Mood normal.         Behavior: Behavior normal.         Thought Content: Thought content normal.         Judgment: Judgment normal.        Result Review : " "  No results found for: \"PROBNP\", \"BNP\"  CMP          6/28/2023    11:25 7/3/2023    09:58 9/25/2023    10:04   CMP   Glucose 95  97  105    BUN 17  19  18    Creatinine 0.99  1.17  1.07    EGFR 78.9  64.6  71.9    Sodium 129  135  137    Potassium 4.4  4.9  5.1    Chloride 95  99  104    Calcium 9.9  10.1  9.7    Total Protein   7.6    Albumin   4.3    Globulin   3.3    Total Bilirubin   0.5    Alkaline Phosphatase   102    AST (SGOT)   19    ALT (SGPT)   21    Albumin/Globulin Ratio   1.3    BUN/Creatinine Ratio 17.2  16.2  16.8    Anion Gap 10.0  12.0  9.4      CBC w/diff          6/21/2023    08:36 6/28/2023    11:25   CBC w/Diff   WBC 8.65  7.62    RBC 4.40  4.68    Hemoglobin 13.3  13.8    Hematocrit 38.4  40.5    MCV 87.3  86.5    MCH 30.2  29.5    MCHC 34.6  34.1    RDW 13.7  13.0    Platelets 188  377    Neutrophil Rel % 74.2  60.6    Immature Granulocyte Rel % 0.3  0.5    Lymphocyte Rel % 14.2  26.0    Monocyte Rel % 9.4  8.5    Eosinophil Rel % 1.6  3.9    Basophil Rel % 0.3  0.5          Lab Results   Component Value Date    TSH 2.280 03/31/2023    TSH 2.300 08/07/2019      Lab Results   Component Value Date    FREET4 1.12 03/31/2023    FREET4 1.1 08/07/2019      A1C Last 3 Results          9/27/2023    12:58   HGBA1C Last 3 Results   Hemoglobin A1C 6.00       PSA          6/28/2023    11:25   PSA   PSA 2.570                     Visit Diagnoses:    ICD-10-CM ICD-9-CM   1. Medicare annual wellness visit, subsequent  Z00.00 V70.0   2. Gastroesophageal reflux disease without esophagitis  K21.9 530.81   3. Allergic rhinitis due to pollen, unspecified seasonality  J30.1 477.0   4. Hyponatremia  E87.1 276.1   5. Screening PSA (prostate specific antigen)  Z12.5 V76.44   6. RLS (restless legs syndrome)  G25.81 333.94   7. Vitamin D deficiency  E55.9 268.9   8. Hypertension, essential  I10 401.9   9. Mixed hyperlipidemia  E78.2 272.2   10. Type 2 diabetes mellitus with diabetic neuropathy, with long-term current " use of insulin  E11.40 250.60    Z79.4 357.2     V58.67   11. Heart murmur  R01.1 785.2       Assessment and Plan   Diagnoses and all orders for this visit:    1. Medicare annual wellness visit, subsequent (Primary)  -     CBC & Differential; Future  -     Comprehensive Metabolic Panel; Future  -     Lipid Panel; Future  -     PSA Screen; Future  -     Hemoglobin A1c; Future    2. Gastroesophageal reflux disease without esophagitis  -     CBC & Differential; Future  -     Comprehensive Metabolic Panel; Future  -     Lipid Panel; Future  -     PSA Screen; Future  -     Hemoglobin A1c; Future    3. Allergic rhinitis due to pollen, unspecified seasonality  -     CBC & Differential; Future  -     Comprehensive Metabolic Panel; Future  -     Lipid Panel; Future  -     PSA Screen; Future  -     Hemoglobin A1c; Future    4. Hyponatremia  -     CBC & Differential; Future  -     Comprehensive Metabolic Panel; Future  -     Lipid Panel; Future  -     PSA Screen; Future  -     Hemoglobin A1c; Future    5. Screening PSA (prostate specific antigen)  -     CBC & Differential; Future  -     Comprehensive Metabolic Panel; Future  -     Lipid Panel; Future  -     PSA Screen; Future  -     Hemoglobin A1c; Future    6. RLS (restless legs syndrome)  -     CBC & Differential; Future  -     Comprehensive Metabolic Panel; Future  -     Lipid Panel; Future  -     PSA Screen; Future  -     Hemoglobin A1c; Future    7. Vitamin D deficiency  -     CBC & Differential; Future  -     Comprehensive Metabolic Panel; Future  -     Lipid Panel; Future  -     PSA Screen; Future  -     Hemoglobin A1c; Future    8. Hypertension, essential  -     CBC & Differential; Future  -     Comprehensive Metabolic Panel; Future  -     Lipid Panel; Future  -     PSA Screen; Future  -     Hemoglobin A1c; Future    9. Mixed hyperlipidemia  -     CBC & Differential; Future  -     Comprehensive Metabolic Panel; Future  -     Lipid Panel; Future  -     PSA Screen;  Future  -     Hemoglobin A1c; Future    10. Type 2 diabetes mellitus with diabetic neuropathy, with long-term current use of insulin  -     CBC & Differential; Future  -     Comprehensive Metabolic Panel; Future  -     Lipid Panel; Future  -     PSA Screen; Future  -     Hemoglobin A1c; Future    11. Heart murmur  -     CBC & Differential; Future  -     Comprehensive Metabolic Panel; Future  -     Lipid Panel; Future  -     PSA Screen; Future  -     Hemoglobin A1c; Future        BMI is >= 25 and <30. (Overweight) The following options were offered after discussion;: weight loss educational material (shared in after visit summary) and exercise counseling/recommendations     Heart murmur --- echocardiogram March 20, 2024 shows normal ejection fraction diastolic dysfunction grade 1, mild to moderate aortic valve stenosis is present, discussed with patient about yearly follow-up at this point,    colonoscopy/EGD,, Dr. Shepard, August 2022, diverticulosis, internal hemorrhoids,, hiatal hernia otherwise upper GI unremarkable    Chest pain --cardiac stress testing--May 4, 2022,- myocardial perfusion imaging showed normal myocardial perfusion study ----no evidence of ischemia May 3, 2022, low risk study normal EKG stress test, normal ejection fraction, previously seen Dr. Harrison    abd pain , wgt loss, back pain, r/o pancreatic isssues, pud ,------- CT scan abdomen and pelvis August 2019,---did not show any evidence of pancreatitis or intra-abdominal pathology, ------S/P EGD WITH DR OLSON OCT 2019, -he has some small nodular areas in both lung bases and some interstitial changes -- CT scan of the chest March 2020,  reticular-nodular pattern in the lung bases, NO specific infiltrates or groundglass opacities are noted, F/U CT CHEST SEPT 2020--NO CHANGES, NO SIGNIFICANT OR worrisome lesions per radiologist, chronic lung changes noted,     anemia     Hyponatremia resolved after stopping Maxide September 25, 2023    Type 2  diabetes --cont , Lantus 40 units daily,, metformin 500 mg daily ozempic 1mg q weekly,    Hypertension -continues-lisinopril 20 mg daily,      Elevated cholesterol-, continues Lipitor 10 mg daily    Gastroesophageal reflux --- continues Nexium 40 mg daily    Restless leg syndrome 10 years--- continues Mirapex,     allergies,--- continue Zyrtec 10 mg daily    Previous left total knee arthroplasty, right ankle fusion foot fusion    Depression, PTSD by history ----currently not taking any medications    [facility]    ed on prn sildenafil        Follow Up   Return in about 6 months (around 9/27/2024).  Patient was given instructions and counseling regarding his condition or for health maintenance advice. Please see specific information pulled into the AVS if appropriate.         Answers submitted by the patient for this visit:  Primary Reason for Visit (Submitted on 3/25/2024)  What is the primary reason for your visit?: Other  Other (Submitted on 3/25/2024)  Please describe your symptoms.: Heart Murmur ultrasound reading  Have you had these symptoms before?: Yes  How long have you been having these symptoms?: Greater than 2 weeks

## 2024-03-27 NOTE — PROGRESS NOTES
The ABCs of the Annual Wellness Visit  Subsequent Medicare Wellness Visit    Subjective      Sajan Hermosillo is a 79 y.o. male who presents for a Subsequent Medicare Wellness Visit.    The following portions of the patient's history were reviewed and   updated as appropriate: allergies, current medications, past family history, past medical history, past social history, past surgical history, and problem list.    Compared to one year ago, the patient feels his physical   health is worse.    Compared to one year ago, the patient feels his mental   health is the same.    Recent Hospitalizations:  He was not admitted to the hospital during the last year.       Current Medical Providers:  Patient Care Team:  Juarez Arrington MD as PCP - General (Internal Medicine)    Outpatient Medications Prior to Visit   Medication Sig Dispense Refill    aspirin 81 MG EC tablet       atorvastatin (LIPITOR) 40 MG tablet atorvastatin 40 mg oral tablet take 1 tablet (40 mg) by oral route once daily   Active      carboxymethylcellulose (REFRESH PLUS) 0.5 % solution 1 drop.      esomeprazole (nexIUM) 40 MG capsule Nexium 40 mg oral capsule,delayed release(DR/EC) take 1 capsule (40 mg) by oral route once daily   Active      fluticasone (Flonase Allergy Relief) 50 MCG/ACT nasal spray Daily.      Ibuprofen 3 %, Gabapentin 10 %, Baclofen 2 %, lidocaine 4 %, Ketamine HCl 4 % Apply 1-2 g topically to the appropriate area as directed 3 (Three) to 4 (Four) times daily. 90 g 5    insulin glargine (Lantus) 100 UNIT/ML injection 50 Units Daily.      ketoconazole (NIZORAL) 2 % cream ketoconazole 2 % topical cream apply to the affected area(s) by topical route once daily   Suspended      lisinopril (PRINIVIL,ZESTRIL) 20 MG tablet lisinopril 20 mg oral tablet take 1 tablet (20 mg) by oral route once daily   Active      nitroglycerin (NITROSTAT) 0.4 MG SL tablet nitroglycerin 0.4 mg sublingual tablet, sublingual place 1 tablet (0.4 mg) by buccal  route at the first sign of an attack; no more than 3 tabs are recommended within a 15 minute period.   Active      ondansetron ODT (ZOFRAN-ODT) 4 MG disintegrating tablet Place 1 tablet on the tongue 4 (Four) Times a Day As Needed for Nausea or Vomiting. 20 tablet 0    pramipexole (MIRAPEX) 0.5 MG tablet Daily.      Semaglutide, 1 MG/DOSE, (OZEMPIC) 2 MG/1.5ML solution pen-injector Inject  under the skin into the appropriate area as directed 1 (One) Time Per Week.       No facility-administered medications prior to visit.       No opioid medication identified on active medication list. I have reviewed chart for other potential  high risk medication/s and harmful drug interactions in the elderly.        Aspirin is on active medication list. Aspirin use is not indicated based on review of current medical condition/s. Risk of harm outweighs potential benefits. Patient instructed to discontinue this medication.  .      Patient Active Problem List   Diagnosis    Hypertension, essential    Allergic rhinitis due to pollen    Gastroesophageal reflux disease without esophagitis    Acute right-sided low back pain without sciatica    RLS (restless legs syndrome)    Mixed hyperlipidemia    Encounter for subsequent annual wellness visit (AWV) in Medicare patient    Diabetes 1.5, managed as type 2    Type 2 diabetes mellitus with diabetic neuropathy, with long-term current use of insulin    Hiatal hernia    Vitamin D deficiency    Pneumonia of right lower lobe due to infectious organism    Febrile illness, acute    Acute prostatitis    Screening PSA (prostate specific antigen)    Hyponatremia    Heart murmur    Hallux limitus of left foot     Advance Care Planning   Advance Care Planning     Advance Directive is not on file.  ACP discussion was held with the patient during this visit. Patient has an advance directive (not in EMR), copy requested.     Objective    Vitals:    03/27/24 1159   BP: 136/81   Pulse: 74   Temp: 98.2 °F  "(36.8 °C)   SpO2: 96%   Weight: 91.6 kg (202 lb)   Height: 177.8 cm (70\")     Estimated body mass index is 28.98 kg/m² as calculated from the following:    Height as of this encounter: 177.8 cm (70\").    Weight as of this encounter: 91.6 kg (202 lb).    BMI is >= 25 and <30. (Overweight) The following options were offered after discussion;: weight loss educational material (shared in after visit summary), exercise counseling/recommendations, and nutrition counseling/recommendations      Does the patient have evidence of cognitive impairment?   No            HEALTH RISK ASSESSMENT    Smoking Status:  Social History     Tobacco Use   Smoking Status Former    Current packs/day: 0.00    Average packs/day: 2.0 packs/day for 12.6 years (25.2 ttl pk-yrs)    Types: Cigarettes, Pipe, Cigars    Start date: 1963    Quit date: 1975    Years since quittin.6    Passive exposure: Past   Smokeless Tobacco Former     Alcohol Consumption:  Social History     Substance and Sexual Activity   Alcohol Use Not Currently     Fall Risk Screen:    STEADI Fall Risk Assessment was completed, and patient is at LOW risk for falls.Assessment completed on:3/27/2024    Depression Screening:      3/27/2024    12:02 PM   PHQ-2/PHQ-9 Depression Screening   Little Interest or Pleasure in Doing Things 0-->not at all   Feeling Down, Depressed or Hopeless 1-->several days   PHQ-9: Brief Depression Severity Measure Score 1       Health Habits and Functional and Cognitive Screening:      3/27/2024    12:00 PM   Functional & Cognitive Status   Do you have difficulty preparing food and eating? No   Do you have difficulty bathing yourself, getting dressed or grooming yourself? No   Do you have difficulty using the toilet? No   Do you have difficulty moving around from place to place? No   Do you have trouble with steps or getting out of a bed or a chair? No   Do you need help using the phone?  No   Are you deaf or do you have serious difficulty " hearing?  Yes   Do you need help to go to places out of walking distance? No   Do you need help shopping? No   Do you need help preparing meals?  No   Do you need help with housework?  No   Do you need help with laundry? No   Do you need help taking your medications? No   Do you need help managing money? No   Do you ever drive or ride in a car without wearing a seat belt? No   Do you have difficulty concentrating, remembering or making decisions? No       Age-appropriate Screening Schedule:  Refer to the list below for future screening recommendations based on patient's age, sex and/or medical conditions. Orders for these recommended tests are listed in the plan section. The patient has been provided with a written plan.    Health Maintenance   Topic Date Due    URINE MICROALBUMIN  Never done    Pneumococcal Vaccine 65+ (1 of 2 - PCV) Never done    DIABETIC EYE EXAM  Never done    TDAP/TD VACCINES (1 - Tdap) Never done    ZOSTER VACCINE (1 of 2) Never done    RSV Vaccine - Adults (1 - 1-dose 60+ series) Never done    HEPATITIS C SCREENING  Never done    HEMOGLOBIN A1C  03/27/2024    BMI FOLLOWUP  03/27/2024    LIPID PANEL  03/31/2024    ANNUAL WELLNESS VISIT  03/27/2025    COVID-19 Vaccine  Completed    INFLUENZA VACCINE  Completed    COLORECTAL CANCER SCREENING  Discontinued                  CMS Preventative Services Quick Reference  Risk Factors Identified During Encounter:    Hearing Problem:  Patient uses hearing aids does well    The above risks/problems have been discussed with the patient.  Pertinent information has been shared with the patient in the After Visit Summary.    Diagnoses and all orders for this visit:    1. Medicare annual wellness visit, subsequent (Primary)    2. Gastroesophageal reflux disease without esophagitis    3. Allergic rhinitis due to pollen, unspecified seasonality    4. Hyponatremia    5. Screening PSA (prostate specific antigen)    6. RLS (restless legs syndrome)    7. Vitamin D  deficiency    8. Hypertension, essential    9. Mixed hyperlipidemia    10. Type 2 diabetes mellitus with diabetic neuropathy, with long-term current use of insulin    11. Heart murmur        Follow Up:   Next Medicare Wellness visit to be scheduled in 1 year.      An After Visit Summary and PPPS were made available to the patient.            Answers submitted by the patient for this visit:  Primary Reason for Visit (Submitted on 3/25/2024)  What is the primary reason for your visit?: Other  Other (Submitted on 3/25/2024)  Please describe your symptoms.: Heart Murmur ultrasound reading  Have you had these symptoms before?: Yes  How long have you been having these symptoms?: Greater than 2 weeks

## 2024-04-10 ENCOUNTER — OFFICE VISIT (OUTPATIENT)
Dept: CARDIOLOGY | Facility: CLINIC | Age: 79
End: 2024-04-10
Payer: MEDICARE

## 2024-04-10 VITALS
SYSTOLIC BLOOD PRESSURE: 162 MMHG | BODY MASS INDEX: 29.35 KG/M2 | DIASTOLIC BLOOD PRESSURE: 91 MMHG | HEART RATE: 64 BPM | HEIGHT: 70 IN | WEIGHT: 205 LBS

## 2024-04-10 DIAGNOSIS — I10 HYPERTENSION, ESSENTIAL: ICD-10-CM

## 2024-04-10 DIAGNOSIS — E78.2 HYPERLIPEMIA, MIXED: ICD-10-CM

## 2024-04-10 DIAGNOSIS — I35.0 NON-RHEUMATIC AORTIC STENOSIS: Primary | ICD-10-CM

## 2024-04-10 PROCEDURE — 3077F SYST BP >= 140 MM HG: CPT | Performed by: INTERNAL MEDICINE

## 2024-04-10 PROCEDURE — 99214 OFFICE O/P EST MOD 30 MIN: CPT | Performed by: INTERNAL MEDICINE

## 2024-04-10 PROCEDURE — 3078F DIAST BP <80 MM HG: CPT | Performed by: INTERNAL MEDICINE

## 2024-04-10 PROCEDURE — 1160F RVW MEDS BY RX/DR IN RCRD: CPT | Performed by: INTERNAL MEDICINE

## 2024-04-10 PROCEDURE — 1159F MED LIST DOCD IN RCRD: CPT | Performed by: INTERNAL MEDICINE

## 2024-04-10 NOTE — PROGRESS NOTES
Chief Complaint  Coronary Artery Disease, BROTHERS, Hypertension, and Hyperlipidemia    Subjective            Sajan Hermosillo presents to [facility] CARDIOLOGY      Mr. Hermosillo is here for follow-up evaluation management of dyspnea, mild CAD by previous cath, essential hypertension, mixed hyperlipidemia.  Overall he is doing well.  He is staying active.  He denies chest pain, palpitations or excessive shortness of breath.    PMH  Past Medical History:   Diagnosis Date    Abdominal pain     Ankle pain     Arthritis     Fracture     Heel pain     High cholesterol     HL (hearing loss)     HTN (hypertension)     Hyperlipemia     Ingrowing toenail     Limb swelling     Neuropathy in diabetes 2021    Plantar fasciitis 2004    Pneumonia June 22 2023    Prostate disorder     Seasonal allergies     Sleep apnea     Stroke 2008    Type 2 diabetes mellitus          SURGICALHX  Past Surgical History:   Procedure Laterality Date    COLONOSCOPY  2019    Dr. Gomes    COLONOSCOPY N/A 08/22/2022    Procedure: COLONOSCOPY;  Surgeon: Robert Koo MD;  Location: [facility] ENDOSCOPY;  Service: Gastroenterology;  Laterality: N/A;  HEMORRHOIDS, DIVERTICULOSIS    ENDOSCOPY  2019    Dr. Gomes    ENDOSCOPY N/A 08/22/2022    Procedure: ESOPHAGOGASTRODUODENOSCOPY with biopsy;  Surgeon: Robert Koo MD;  Location: [facility] ENDOSCOPY;  Service: Gastroenterology;  Laterality: N/A;  SMALL HIATAL HERNIA    FOOT FRACTURE SURGERY  1999    FOOT SURGERY Right     JOINT REPLACEMENT      OTHER SURGICAL HISTORY      Artificial joints/limbs    OTHER SURGICAL HISTORY      Metal Implants    REPLACEMENT TOTAL KNEE Left         SOC  Social History     Socioeconomic History    Marital status:    Tobacco Use    Smoking status: Former     Current packs/day: 0.00     Average packs/day: 2.0 packs/day for 12.6 years (25.2 ttl pk-yrs)     Types: Cigarettes, Pipe, Cigars     Start date: 1/1/1963     Quit date: 7/30/1975     Years  since quittin.7     Passive exposure: Past    Smokeless tobacco: Former   Vaping Use    Vaping status: Never Used   Substance and Sexual Activity    Alcohol use: Not Currently    Drug use: Never    Sexual activity: Yes     Partners: Female     Birth control/protection: Surgical, Tubal ligation         FAMHX  Family History   Problem Relation Age of Onset    Heart disease Mother     Diabetes Mother         unspecified type    Arthritis Mother     Heart disease Father     Arthritis Father     Heart disease Sister     Diabetes Sister         unspecified type    Heart disease Brother     Arthritis Brother     Diabetes Sister     Colon cancer Neg Hx     Malig Hyperthermia Neg Hx           ALLERGY  Allergies   Allergen Reactions    Hydrocodone Rash    Hydrocodone-Acetaminophen Rash    Hydrocodone-Ibuprofen Rash    Oxycodone Rash    Penicillins Rash        MEDSCURRENT    Current Outpatient Medications:     aspirin 81 MG EC tablet, , Disp: , Rfl:     atorvastatin (LIPITOR) 40 MG tablet, atorvastatin 40 mg oral tablet take 1 tablet (40 mg) by oral route once daily   Active, Disp: , Rfl:     carboxymethylcellulose (REFRESH PLUS) 0.5 % solution, 1 drop., Disp: , Rfl:     esomeprazole (nexIUM) 40 MG capsule, Nexium 40 mg oral capsule,delayed release(DR/EC) take 1 capsule (40 mg) by oral route once daily   Active, Disp: , Rfl:     fluticasone (Flonase Allergy Relief) 50 MCG/ACT nasal spray, Daily., Disp: , Rfl:     Ibuprofen 3 %, Gabapentin 10 %, Baclofen 2 %, lidocaine 4 %, Ketamine HCl 4 %, Apply 1-2 g topically to the appropriate area as directed 3 (Three) to 4 (Four) times daily., Disp: 90 g, Rfl: 5    insulin glargine (Lantus) 100 UNIT/ML injection, 50 Units Daily., Disp: , Rfl:     ketoconazole (NIZORAL) 2 % cream, ketoconazole 2 % topical cream apply to the affected area(s) by topical route once daily   Suspended, Disp: , Rfl:     lisinopril (PRINIVIL,ZESTRIL) 20 MG tablet, lisinopril 20 mg oral tablet take 1 tablet  "(20 mg) by oral route once daily   Active, Disp: , Rfl:     nitroglycerin (NITROSTAT) 0.4 MG SL tablet, nitroglycerin 0.4 mg sublingual tablet, sublingual place 1 tablet (0.4 mg) by buccal route at the first sign of an attack; no more than 3 tabs are recommended within a 15 minute period.   Active, Disp: , Rfl:     ondansetron ODT (ZOFRAN-ODT) 4 MG disintegrating tablet, Place 1 tablet on the tongue 4 (Four) Times a Day As Needed for Nausea or Vomiting., Disp: 20 tablet, Rfl: 0    pramipexole (MIRAPEX) 0.5 MG tablet, Daily., Disp: , Rfl:     Semaglutide, 1 MG/DOSE, (OZEMPIC) 2 MG/1.5ML solution pen-injector, Inject  under the skin into the appropriate area as directed 1 (One) Time Per Week., Disp: , Rfl:       Review of Systems   Cardiovascular:  Negative for chest pain and dyspnea on exertion.   Respiratory:  Negative for shortness of breath.         Objective     /91   Pulse 64   Ht 177.8 cm (70\")   Wt 93 kg (205 lb)   BMI 29.41 kg/m²       General Appearance:   well developed  well nourished  HENT:   oropharynx moist  lips not cyanotic  Neck:  thyroid not enlarged  supple  Respiratory:  no respiratory distress  normal breath sounds  no rales  Cardiovascular:  no jugular venous distention  regular rhythm  apical impulse normal  S1 normal, S2 normal  no S3, no S4   Grade 2 right parasternal systolic murmur  no rub, no thrill  carotid pulses normal; no bruit  pedal pulses normal  lower extremity edema: none    Musculoskeletal:  no clubbing of fingers.   normocephalic, head atraumatic  Skin:   warm, dry  Psychiatric:  judgement and insight appropriate  normal mood and affect      Result Review :     The following data was reviewed by: Angel Harrison MD on 03/31/2023:    CMP          6/28/2023    11:25 7/3/2023    09:58 9/25/2023    10:04   CMP   Glucose 95  97  105    BUN 17  19  18    Creatinine 0.99  1.17  1.07    EGFR 78.9  64.6  71.9    Sodium 129  135  137    Potassium 4.4  4.9  5.1  "   Chloride 95  99  104    Calcium 9.9  10.1  9.7    Total Protein   7.6    Albumin   4.3    Globulin   3.3    Total Bilirubin   0.5    Alkaline Phosphatase   102    AST (SGOT)   19    ALT (SGPT)   21    Albumin/Globulin Ratio   1.3    BUN/Creatinine Ratio 17.2  16.2  16.8    Anion Gap 10.0  12.0  9.4      CBC          6/21/2023    08:36 6/28/2023    11:25   CBC   WBC 8.65  7.62    RBC 4.40  4.68    Hemoglobin 13.3  13.8    Hematocrit 38.4  40.5    MCV 87.3  86.5    MCH 30.2  29.5    MCHC 34.6  34.1    RDW 13.7  13.0    Platelets 188  377                Data reviewed : Primary care records reviewed, recent echo done in March 2024 showed normal biventricular function with mild to moderate aortic stenosis      Procedures               Assessment and Plan        ASSESSMENT:  Encounter Diagnoses   Name Primary?    Non-rheumatic aortic stenosis Yes    Hypertension, essential     Hyperlipemia, mixed          PLAN:    1.  Mild to moderate aortic stenosis by recent echo-at this time he is not having symptoms from this.  This will be followed with annual echoes.  2.  Mild CAD-continue statin therapy for primary prevention.  3.  Essential hypertension-uncontrolled today but he has not taken his medication yet.  Was controlled in primary care visit recently.  Continue home monitoring and current medical therapy  4.  Mixed hyperlipidemia-stable continue statin therapy for primary prevention LDL lowering    Will arrange annual echo with follow-up visit thereafter          Patient was given instructions and counseling regarding his condition or for health maintenance advice. Please see specific information pulled into the AVS if appropriate.             ANJANA Harrison MD  4/10/2024    12:38 EDT

## 2024-04-29 ENCOUNTER — OFFICE VISIT (OUTPATIENT)
Dept: PODIATRY | Facility: CLINIC | Age: 79
End: 2024-04-29
Payer: OTHER GOVERNMENT

## 2024-04-29 VITALS
OXYGEN SATURATION: 95 % | WEIGHT: 201 LBS | HEART RATE: 61 BPM | BODY MASS INDEX: 28.84 KG/M2 | TEMPERATURE: 98.3 F | DIASTOLIC BLOOD PRESSURE: 86 MMHG | SYSTOLIC BLOOD PRESSURE: 139 MMHG

## 2024-04-29 DIAGNOSIS — G62.9 NEUROPATHY: ICD-10-CM

## 2024-04-29 DIAGNOSIS — M20.5X2 HALLUX LIMITUS OF LEFT FOOT: ICD-10-CM

## 2024-04-29 DIAGNOSIS — E11.8 DIABETIC FOOT: Primary | ICD-10-CM

## 2024-04-29 DIAGNOSIS — E11.9 NON-INSULIN DEPENDENT TYPE 2 DIABETES MELLITUS: ICD-10-CM

## 2024-04-29 DIAGNOSIS — M79.672 FOOT PAIN, LEFT: ICD-10-CM

## 2024-04-29 DIAGNOSIS — G62.2 POLYNEUROPATHY DUE TO OTHER TOXIC AGENTS: ICD-10-CM

## 2024-04-29 DIAGNOSIS — Z77.098 AGENT ORANGE EXPOSURE: ICD-10-CM

## 2024-04-29 PROCEDURE — 99214 OFFICE O/P EST MOD 30 MIN: CPT | Performed by: PODIATRIST

## 2024-04-29 NOTE — PROGRESS NOTES
Answers submitted by the patient for this visit:  Primary Reason for Visit (Submitted on 3/7/2024)  What is the primary reason for your visit?: Other  Other (Submitted on 3/7/2024)  Please describe your symptoms.:  arthritis pain left foot  Have you had these symptoms before?: No  How long have you been having these symptoms?: Greater than 2 weeks  Please list any medications you are currently taking for this condition.: ibuprofen 800mg      [facility] P[facility]    Today's Date: 04/29/24    Patient Name: Sajan Hermosillo  MRN: [id number]  CSN: [id number]  PCP: Jaurez Arrington MD, Last PCP Visit:  6/28/2023  Referring Provider: Anastasiia Xiong MD    SUBJECTIVE     Chief Complaint   Patient presents with    Left Foot - Follow-up, Pre-op Exam     Surgery 5/3/24. Hibiclens given     HPI: Sajan Hermosillo, a 79 y.o.male, comes presents to clinic fo preoperative history and physical for left first MPJ fusion r:    New, Established, New Problem: Established  Location: Left first MPJ  Duration: Greater than 1 month  Onset: Insidious  Nature: Left, painful joint  Stable, worsening, improving: Worsening  Aggravating factors:  Patient relates pain is aggravated by shoe gear and ambulation.    Previous Treatment: Shoe gear, change activities, avoiding activities    Patient denies any fevers, chills, nausea, vomiting, shortness of breathe, nor any other constitutional signs nor symptoms.    Patient controlling diabetes via: Oral medications    Patient states their most recent blood glucose reading was 194    Patient denies any fevers, chills, nausea, vomiting, shortness of breathe, nor any other constitutional signs nor symptoms.    Patient relates no medical changes since their last visit.    Patient relates exposure to Agent Orange in [place] during his service during the [place].    Past Medical History:   Diagnosis Date    Abdominal pain     Ankle pain     Arthritis     Fracture     Heel pain      High cholesterol     HL (hearing loss)     HTN (hypertension)     Hyperlipemia     Ingrowing toenail     Limb swelling     Neuropathy in diabetes     Plantar fasciitis 2004    Pneumonia 2023    Prostate disorder     Seasonal allergies     Sleep apnea     Stroke 2008    Type 2 diabetes mellitus      Past Surgical History:   Procedure Laterality Date    COLONOSCOPY      Dr. Gomes    COLONOSCOPY N/A 2022    Procedure: COLONOSCOPY;  Surgeon: Robert Koo MD;  Location: [facility] ENDOSCOPY;  Service: Gastroenterology;  Laterality: N/A;  HEMORRHOIDS, DIVERTICULOSIS    ENDOSCOPY      Dr. Gomes    ENDOSCOPY N/A 2022    Procedure: ESOPHAGOGASTRODUODENOSCOPY with biopsy;  Surgeon: Robert Koo MD;  Location: [facility] ENDOSCOPY;  Service: Gastroenterology;  Laterality: N/A;  SMALL HIATAL HERNIA    FOOT FRACTURE SURGERY      FOOT SURGERY Right     JOINT REPLACEMENT      OTHER SURGICAL HISTORY      Artificial joints/limbs    OTHER SURGICAL HISTORY      Metal Implants    REPLACEMENT TOTAL KNEE Left      Family History   Problem Relation Age of Onset    Heart disease Mother     Diabetes Mother         unspecified type    Arthritis Mother     Heart disease Father     Arthritis Father     Heart disease Sister     Diabetes Sister         unspecified type    Heart disease Brother     Arthritis Brother     Diabetes Sister     Colon cancer Neg Hx     Malig Hyperthermia Neg Hx      Social History     Socioeconomic History    Marital status:    Tobacco Use    Smoking status: Former     Current packs/day: 0.00     Average packs/day: 2.0 packs/day for 12.6 years (25.2 ttl pk-yrs)     Types: Cigarettes, Pipe, Cigars     Start date: 1963     Quit date: 1975     Years since quittin.7     Passive exposure: Past    Smokeless tobacco: Former   Vaping Use    Vaping status: Never Used   Substance and Sexual Activity    Alcohol use: Not Currently    Drug use: Never     Sexual activity: Yes     Partners: Female     Birth control/protection: Surgical, Tubal ligation     Allergies   Allergen Reactions    Hydrocodone Rash    Hydrocodone-Acetaminophen Rash    Hydrocodone-Ibuprofen Rash    Oxycodone Rash    Penicillins Rash     Current Outpatient Medications   Medication Sig Dispense Refill    aspirin 81 MG EC tablet       atorvastatin (LIPITOR) 40 MG tablet atorvastatin 40 mg oral tablet take 1 tablet (40 mg) by oral route once daily   Active      carboxymethylcellulose (REFRESH PLUS) 0.5 % solution 1 drop.      esomeprazole (nexIUM) 40 MG capsule Nexium 40 mg oral capsule,delayed release(DR/EC) take 1 capsule (40 mg) by oral route once daily   Active      fluticasone (Flonase Allergy Relief) 50 MCG/ACT nasal spray Daily.      Ibuprofen 3 %, Gabapentin 10 %, Baclofen 2 %, lidocaine 4 %, Ketamine HCl 4 % Apply 1-2 g topically to the appropriate area as directed 3 (Three) to 4 (Four) times daily. 90 g 5    insulin glargine (Lantus) 100 UNIT/ML injection 50 Units Daily.      ketoconazole (NIZORAL) 2 % cream ketoconazole 2 % topical cream apply to the affected area(s) by topical route once daily   Suspended      lisinopril (PRINIVIL,ZESTRIL) 20 MG tablet lisinopril 20 mg oral tablet take 1 tablet (20 mg) by oral route once daily   Active      nitroglycerin (NITROSTAT) 0.4 MG SL tablet nitroglycerin 0.4 mg sublingual tablet, sublingual place 1 tablet (0.4 mg) by buccal route at the first sign of an attack; no more than 3 tabs are recommended within a 15 minute period.   Active      ondansetron ODT (ZOFRAN-ODT) 4 MG disintegrating tablet Place 1 tablet on the tongue 4 (Four) Times a Day As Needed for Nausea or Vomiting. 20 tablet 0    pramipexole (MIRAPEX) 0.5 MG tablet Daily.      Semaglutide, 1 MG/DOSE, (OZEMPIC) 2 MG/1.5ML solution pen-injector Inject  under the skin into the appropriate area as directed 1 (One) Time Per Week.       No current facility-administered medications for this  visit.     Review of Systems   Constitutional: Negative.    Musculoskeletal:         Left 1st MPJ   Neurological:  Positive for numbness.   All other systems reviewed and are negative.      OBJECTIVE     Vitals:    04/29/24 0929   BP: 139/86   Pulse: 61   Temp: 98.3 °F (36.8 °C)   SpO2: 95%       Body mass index is 28.84 kg/m².    Lab Results   Component Value Date    HGBA1C 6.00 (H) 09/27/2023       Lab Results   Component Value Date    GLUCOSE 105 (H) 09/25/2023    CALCIUM 9.7 09/25/2023     09/25/2023    K 5.1 09/25/2023    CO2 23.6 09/25/2023     09/25/2023    BUN 18 09/25/2023    CREATININE 1.07 09/25/2023    BCR 16.8 09/25/2023    ANIONGAP 9.4 09/25/2023       Patient seen in no apparent distress.      PHYSICAL EXAM:     Foot/Ankle Exam    GENERAL  Appearance:  elderly  Orientation:  AAOx3  Affect:  appropriate  Gait:  unimpaired  Assistance:  independent  Right shoe gear: casual shoe  Left shoe gear: casual shoe    VASCULAR     Right Foot Vascularity   Normal vascular exam    Dorsalis pedis:  2+  Posterior tibial:  2+  Skin temperature:  warm  Edema grading:  None  CFT:  < 3 seconds  Pedal hair growth:  Absent  Varicosities:  none     Left Foot Vascularity   Normal vascular exam    Dorsalis pedis:  2+  Posterior tibial:  2+  Skin temperature:  warm  Edema grading:  None  CFT:  < 3 seconds  Pedal hair growth:  Absent  Varicosities:  none     NEUROLOGIC     Right Foot Neurologic   Light touch sensation: diminished  Vibratory sensation: diminished  Hot/Cold sensation: diminished  Protective Sensation using [place] Monofilament:   Sites intact: 6  Sites tested: 10     Left Foot Neurologic   Light touch sensation: diminished  Vibratory sensation: diminished  Hot/Cold sensation:  diminished  Protective Sensation using [place] Monofilament:   Sites intact: 6  Sites tested: 10    MUSCULOSKELETAL     Left Foot Musculoskeletal   Ecchymosis:  none  Tenderness:  MTP 1 dorsal  tenderness  Hallux limitus: Yes      MUSCLE STRENGTH     Right Foot Muscle Strength   Foot dorsiflexion:  4  Foot plantar flexion:  4  Foot inversion:  4  Foot eversion:  4     Left Foot Muscle Strength   Foot dorsiflexion:  4  Foot plantar flexion:  4  Foot inversion:  4  Foot eversion:  4    RANGE OF MOTION     Right Foot Range of Motion   Foot and ankle ROM within normal limits    Ankle dorsiflexion: Right ankle active dorsiflexion: No range of motion of Chopart's and subtalar joints consistent with past surgical history of fusions of these joints.     Left Foot Range of Motion   Foot and ankle ROM within normal limits    1st MTP extension: decreased with pain  1st MTP flexion: decreased with pain    DERMATOLOGIC      Right Foot Dermatologic   Skin  Right foot skin is intact.      Left Foot Dermatologic   Skin  Left foot skin is intact.         ASSESSMENT/PLAN     Diagnoses and all orders for this visit:    1. Diabetic foot (Primary)    2. Agent orange exposure    3. 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 Neuropathy    6. Hallux limitus of left foot    7. Foot pain, left    Comprehensive lower extremity examination and evaluation was performed.    Discussed findings and treatment plan including risks, benefits, and treatment options with patient in detail. Patient agreed with treatment plan.    Planned surgery: Medial left first MPJ fusion on 3 May 2024    The risks and benefits of the surgery were discussed with the patient.  This discussion included possible complications of requiring further surgery, possible delayed wound healing, further surgery requiring amputation of the foot or leg, and also included the complication of death.  All the patient's questions were answered to their satisfaction.  Patient states they would like to proceed with the procedure.  Discussed with the patient at length surgical versus nonsurgical options.  Explained to the  patient in detail that surgical intervention is only indicated when the level of pain is such that it negatively affects her daily life.    It was explained to the patient that surgical interventions often times do not relieve all of the pain associated with the deformity    Risks and complications associated with surgical versus nonsurgical options were explained.  The patient understands that the problem will most likely continue to evolve and surgical intervention is the only treatment plan that actually corrects the deformities.    Upon discussion of non-surgical conservative option, surgical correction, post-operative requirements along with risk and benefits of the surgery along with expected outcomes, the patient states they would like to proceed with the scheduling surgery.      The patient has decided to move forward with surgical intervention understanding all of these risks and complications.    An After Visit Summary was printed and given to the patient at discharge, including (if requested) any available informative/educational handouts regarding diagnosis, treatment, or medications. All questions were answered to patient/family satisfaction. Should symptoms fail to improve or worsen they agree to call or return to clinic or to go to the Emergency Department. Discussed the importance of following up with any needed screening tests/labs/specialist appointments and any requested follow-up recommended by me today. Importance of maintaining follow-up discussed and patient accepts that missed appointments can delay diagnosis and potentially lead to worsening of conditions.    Return in about 8 days (around 5/7/2024) for Post Operative, X-ray needed, Cast change., or sooner if acute issues arise.    This document has been electronically signed by Jeff Gross DPM on April 29, 2024 09:41 EDT

## 2024-04-29 NOTE — H&P (VIEW-ONLY)
Answers submitted by the patient for this visit:  Primary Reason for Visit (Submitted on 3/7/2024)  What is the primary reason for your visit?: Other  Other (Submitted on 3/7/2024)  Please describe your symptoms.:  arthritis pain left foot  Have you had these symptoms before?: No  How long have you been having these symptoms?: Greater than 2 weeks  Please list any medications you are currently taking for this condition.: ibuprofen 800mg      [facility] PODIATRY    Today's Date: 04/29/24    Patient Name: Sajan Hermosillo  MRN: [id number]  CSN: [id number]  PCP: Juarez Arrington MD, Last PCP Visit:  6/28/2023  Referring Provider: Anastasiia Xiong MD    SUBJECTIVE     Chief Complaint   Patient presents with    Left Foot - Follow-up, Pre-op Exam     Surgery 5/3/24. Hibiclens given     HPI: Sajan Hermosillo, a 79 y.o.male, comes presents to clinic fo preoperative history and physical for left first MPJ fusion r:    New, Established, New Problem: Established  Location: Left first MPJ  Duration: Greater than 1 month  Onset: Insidious  Nature: Left, painful joint  Stable, worsening, improving: Worsening  Aggravating factors:  Patient relates pain is aggravated by shoe gear and ambulation.    Previous Treatment: Shoe gear, change activities, avoiding activities    Patient denies any fevers, chills, nausea, vomiting, shortness of breathe, nor any other constitutional signs nor symptoms.    Patient controlling diabetes via: Oral medications    Patient states their most recent blood glucose reading was 194    Patient denies any fevers, chills, nausea, vomiting, shortness of breathe, nor any other constitutional signs nor symptoms.    Patient relates no medical changes since their last visit.    Patient relates exposure to Agent Orange in [place] during his service during the [place].    Past Medical History:   Diagnosis Date    Abdominal pain     Ankle pain     Arthritis     Fracture     Heel pain      High cholesterol     HL (hearing loss)     HTN (hypertension)     Hyperlipemia     Ingrowing toenail     Limb swelling     Neuropathy in diabetes     Plantar fasciitis 2004    Pneumonia 2023    Prostate disorder     Seasonal allergies     Sleep apnea     Stroke 2008    Type 2 diabetes mellitus      Past Surgical History:   Procedure Laterality Date    COLONOSCOPY      Dr. Gomes    COLONOSCOPY N/A 2022    Procedure: COLONOSCOPY;  Surgeon: Robert Koo MD;  Location: [facility] ENDOSCOPY;  Service: Gastroenterology;  Laterality: N/A;  HEMORRHOIDS, DIVERTICULOSIS    ENDOSCOPY      Dr. Gomes    ENDOSCOPY N/A 2022    Procedure: ESOPHAGOGASTRODUODENOSCOPY with biopsy;  Surgeon: Robert Koo MD;  Location: [facility] ENDOSCOPY;  Service: Gastroenterology;  Laterality: N/A;  SMALL HIATAL HERNIA    FOOT FRACTURE SURGERY      FOOT SURGERY Right     JOINT REPLACEMENT      OTHER SURGICAL HISTORY      Artificial joints/limbs    OTHER SURGICAL HISTORY      Metal Implants    REPLACEMENT TOTAL KNEE Left      Family History   Problem Relation Age of Onset    Heart disease Mother     Diabetes Mother         unspecified type    Arthritis Mother     Heart disease Father     Arthritis Father     Heart disease Sister     Diabetes Sister         unspecified type    Heart disease Brother     Arthritis Brother     Diabetes Sister     Colon cancer Neg Hx     Malig Hyperthermia Neg Hx      Social History     Socioeconomic History    Marital status:    Tobacco Use    Smoking status: Former     Current packs/day: 0.00     Average packs/day: 2.0 packs/day for 12.6 years (25.2 ttl pk-yrs)     Types: Cigarettes, Pipe, Cigars     Start date: 1963     Quit date: 1975     Years since quittin.7     Passive exposure: Past    Smokeless tobacco: Former   Vaping Use    Vaping status: Never Used   Substance and Sexual Activity    Alcohol use: Not Currently    Drug use: Never     Sexual activity: Yes     Partners: Female     Birth control/protection: Surgical, Tubal ligation     Allergies   Allergen Reactions    Hydrocodone Rash    Hydrocodone-Acetaminophen Rash    Hydrocodone-Ibuprofen Rash    Oxycodone Rash    Penicillins Rash     Current Outpatient Medications   Medication Sig Dispense Refill    aspirin 81 MG EC tablet       atorvastatin (LIPITOR) 40 MG tablet atorvastatin 40 mg oral tablet take 1 tablet (40 mg) by oral route once daily   Active      carboxymethylcellulose (REFRESH PLUS) 0.5 % solution 1 drop.      esomeprazole (nexIUM) 40 MG capsule Nexium 40 mg oral capsule,delayed release(DR/EC) take 1 capsule (40 mg) by oral route once daily   Active      fluticasone (Flonase Allergy Relief) 50 MCG/ACT nasal spray Daily.      Ibuprofen 3 %, Gabapentin 10 %, Baclofen 2 %, lidocaine 4 %, Ketamine HCl 4 % Apply 1-2 g topically to the appropriate area as directed 3 (Three) to 4 (Four) times daily. 90 g 5    insulin glargine (Lantus) 100 UNIT/ML injection 50 Units Daily.      ketoconazole (NIZORAL) 2 % cream ketoconazole 2 % topical cream apply to the affected area(s) by topical route once daily   Suspended      lisinopril (PRINIVIL,ZESTRIL) 20 MG tablet lisinopril 20 mg oral tablet take 1 tablet (20 mg) by oral route once daily   Active      nitroglycerin (NITROSTAT) 0.4 MG SL tablet nitroglycerin 0.4 mg sublingual tablet, sublingual place 1 tablet (0.4 mg) by buccal route at the first sign of an attack; no more than 3 tabs are recommended within a 15 minute period.   Active      ondansetron ODT (ZOFRAN-ODT) 4 MG disintegrating tablet Place 1 tablet on the tongue 4 (Four) Times a Day As Needed for Nausea or Vomiting. 20 tablet 0    pramipexole (MIRAPEX) 0.5 MG tablet Daily.      Semaglutide, 1 MG/DOSE, (OZEMPIC) 2 MG/1.5ML solution pen-injector Inject  under the skin into the appropriate area as directed 1 (One) Time Per Week.       No current facility-administered medications for this  visit.     Review of Systems   Constitutional: Negative.    Musculoskeletal:         Left 1st MPJ   Neurological:  Positive for numbness.   All other systems reviewed and are negative.      OBJECTIVE     Vitals:    04/29/24 0929   BP: 139/86   Pulse: 61   Temp: 98.3 °F (36.8 °C)   SpO2: 95%       Body mass index is 28.84 kg/m².    Lab Results   Component Value Date    HGBA1C 6.00 (H) 09/27/2023       Lab Results   Component Value Date    GLUCOSE 105 (H) 09/25/2023    CALCIUM 9.7 09/25/2023     09/25/2023    K 5.1 09/25/2023    CO2 23.6 09/25/2023     09/25/2023    BUN 18 09/25/2023    CREATININE 1.07 09/25/2023    BCR 16.8 09/25/2023    ANIONGAP 9.4 09/25/2023       Patient seen in no apparent distress.      PHYSICAL EXAM:     Foot/Ankle Exam    GENERAL  Appearance:  elderly  Orientation:  AAOx3  Affect:  appropriate  Gait:  unimpaired  Assistance:  independent  Right shoe gear: casual shoe  Left shoe gear: casual shoe    VASCULAR     Right Foot Vascularity   Normal vascular exam    Dorsalis pedis:  2+  Posterior tibial:  2+  Skin temperature:  warm  Edema grading:  None  CFT:  < 3 seconds  Pedal hair growth:  Absent  Varicosities:  none     Left Foot Vascularity   Normal vascular exam    Dorsalis pedis:  2+  Posterior tibial:  2+  Skin temperature:  warm  Edema grading:  None  CFT:  < 3 seconds  Pedal hair growth:  Absent  Varicosities:  none     NEUROLOGIC     Right Foot Neurologic   Light touch sensation: diminished  Vibratory sensation: diminished  Hot/Cold sensation: diminished  Protective Sensation using [place]rishna Monofilament:   Sites intact: 6  Sites tested: 10     Left Foot Neurologic   Light touch sensation: diminished  Vibratory sensation: diminished  Hot/Cold sensation:  diminished  Protective Sensation using [place]rishna Monofilament:   Sites intact: 6  Sites tested: 10    MUSCULOSKELETAL     Left Foot Musculoskeletal   Ecchymosis:  none  Tenderness:  MTP 1 dorsal  tenderness  Hallux limitus: Yes      MUSCLE STRENGTH     Right Foot Muscle Strength   Foot dorsiflexion:  4  Foot plantar flexion:  4  Foot inversion:  4  Foot eversion:  4     Left Foot Muscle Strength   Foot dorsiflexion:  4  Foot plantar flexion:  4  Foot inversion:  4  Foot eversion:  4    RANGE OF MOTION     Right Foot Range of Motion   Foot and ankle ROM within normal limits    Ankle dorsiflexion: Right ankle active dorsiflexion: No range of motion of Chopart's and subtalar joints consistent with past surgical history of fusions of these joints.     Left Foot Range of Motion   Foot and ankle ROM within normal limits    1st MTP extension: decreased with pain  1st MTP flexion: decreased with pain    DERMATOLOGIC      Right Foot Dermatologic   Skin  Right foot skin is intact.      Left Foot Dermatologic   Skin  Left foot skin is intact.         ASSESSMENT/PLAN     Diagnoses and all orders for this visit:    1. Diabetic foot (Primary)    2. Agent orange exposure    3. 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 Neuropathy    6. Hallux limitus of left foot    7. Foot pain, left    Comprehensive lower extremity examination and evaluation was performed.    Discussed findings and treatment plan including risks, benefits, and treatment options with patient in detail. Patient agreed with treatment plan.    Planned surgery: Medial left first MPJ fusion on 3 May 2024    The risks and benefits of the surgery were discussed with the patient.  This discussion included possible complications of requiring further surgery, possible delayed wound healing, further surgery requiring amputation of the foot or leg, and also included the complication of death.  All the patient's questions were answered to their satisfaction.  Patient states they would like to proceed with the procedure.  Discussed with the patient at length surgical versus nonsurgical options.  Explained to the  patient in detail that surgical intervention is only indicated when the level of pain is such that it negatively affects her daily life.    It was explained to the patient that surgical interventions often times do not relieve all of the pain associated with the deformity    Risks and complications associated with surgical versus nonsurgical options were explained.  The patient understands that the problem will most likely continue to evolve and surgical intervention is the only treatment plan that actually corrects the deformities.    Upon discussion of non-surgical conservative option, surgical correction, post-operative requirements along with risk and benefits of the surgery along with expected outcomes, the patient states they would like to proceed with the scheduling surgery.      The patient has decided to move forward with surgical intervention understanding all of these risks and complications.    An After Visit Summary was printed and given to the patient at discharge, including (if requested) any available informative/educational handouts regarding diagnosis, treatment, or medications. All questions were answered to patient/family satisfaction. Should symptoms fail to improve or worsen they agree to call or return to clinic or to go to the Emergency Department. Discussed the importance of following up with any needed screening tests/labs/specialist appointments and any requested follow-up recommended by me today. Importance of maintaining follow-up discussed and patient accepts that missed appointments can delay diagnosis and potentially lead to worsening of conditions.    Return in about 8 days (around 5/7/2024) for Post Operative, X-ray needed, Cast change., or sooner if acute issues arise.    This document has been electronically signed by Jeff Gross DPM on April 29, 2024 09:41 EDT

## 2024-05-03 ENCOUNTER — ANESTHESIA EVENT (OUTPATIENT)
Dept: PERIOP | Facility: HOSPITAL | Age: 79
End: 2024-05-03

## 2024-05-03 ENCOUNTER — HOSPITAL ENCOUNTER (OUTPATIENT)
Facility: HOSPITAL | Age: 79
Setting detail: HOSPITAL OUTPATIENT SURGERY
Discharge: HOME | End: 2024-05-03
Attending: PODIATRIST | Admitting: PODIATRIST
Payer: OTHER GOVERNMENT

## 2024-05-03 ENCOUNTER — ANESTHESIA (OUTPATIENT)
Dept: PERIOP | Facility: HOSPITAL | Age: 79
End: 2024-05-03

## 2024-05-03 VITALS
RESPIRATION RATE: 20 BRPM | HEART RATE: 62 BPM | OXYGEN SATURATION: 98 % | DIASTOLIC BLOOD PRESSURE: 82 MMHG | TEMPERATURE: 97 F | SYSTOLIC BLOOD PRESSURE: 149 MMHG

## 2024-05-06 DIAGNOSIS — M20.5X2 HALLUX LIMITUS OF LEFT FOOT: Primary | ICD-10-CM

## 2024-05-07 NOTE — PRE-PROCEDURE INSTRUCTIONS
PATIENT INSTRUCTED TO BE:    - NOTHING TO EAT AFTER MIDNIGHT OR CHEW, EXCEPT CAN HAVE CLEAR LIQUIDS 2 HOURS PRIOR TO SURGERY ARRIVAL TIME     - TO HOLD ALL VITAMINS, SUPPLEMENTS, NSAIDS FOR ONE WEEK PRIOR TO THEIR SURGICAL PROCEDURE    - DO NOT TAKE _OZEMPIC  7 DAYS PRIOR TO PROCEDURE PER ANESTHESIA RECOMMENDATIONS/INSTRUCTIONS (LAST DOSE TAKEN 5-2-24)    - INSTRUCTED PT TO USE SURGICAL SOAP 1 TIME THE NIGHT PRIOR TO SURGERY OR THE AM OF SURGERY.   USE SOAP FROM NECK TO TOES AVOID THEIR FACE, HAIR, AND PRIVATE PARTS. INSTRUCTED NO LOTIONS, JEWELRY, PIERCINGS, OR DEODORANT DAY OF SURGERY    - IF DIABETIC, CHECK BLOOD GLUCOSE IF LESS THAN 70 OR HAVING SYMPTOMS CALL THE PREOP AREA FOR INSTRUCTIONS ON AM OF SURGERY ([phone] )    -INSTRUCTED TO TAKE THE FOLLOWING MEDICATIONS THE DAY OF SURGERY:              LIPITOR, REFRESH EYE DROPS, NEXIUM, FLONASE, ZOFRAN PRN, MIRAPEX    INSTRUCTED PT TO TO TAKE METFORMIN BY 6PM THE NIGHT PRIOR TO SURGERY AND DO NOT TAKE DAY OF SURGERY      - DO NOT BRING ANY MEDICATIONS WITH YOU TO THE HOSPITAL THE DAY OF SURGERY, EXCEPT IF USE INHALERS. BRING INHALERS DAY OF SURGERY       - BRING CPAP OR BIPAP TO THE HOSPITAL ONLY IF ARE SPENDING THE NIGHT    - DO NOT SMOKE OR VAPE 24 HOURS PRIOR TO PROCEDURE PER ANESTHESIA REQUEST     -MAKE SURE YOU HAVE A RIDE HOME OR SOMEONE TO STAY WITH YOU THE DAY OF THE PROCEDURE AFTER YOU GO HOME    - FOLLOW ANY OTHER INSTRUCTIONS GIVEN TO YOU BY YOUR SURGEON'S OFFICE.     - PREADMISSION TESTING NURSE CAMERON ALVA RN AT  [phone] IF HAVE ANY QUESTIONS     PATIENT PROVIDED THE NUMBER FOR PREOP SURGICAL DEPT IF HAD QUESTIONS AFTER HOURS PRIOR TO SURGERY ([phone]   INFORMED PT IF NO ANSWER, LEAVE A MESSAGE AND SOMEONE WILL RETURN THEIR CALL       PATIENT VERBALIZED UNDERSTANDING

## 2024-05-08 ENCOUNTER — ANESTHESIA EVENT (OUTPATIENT)
Dept: PERIOP | Facility: HOSPITAL | Age: 79
End: 2024-05-08
Payer: OTHER GOVERNMENT

## 2024-05-10 ENCOUNTER — HOSPITAL ENCOUNTER (OUTPATIENT)
Facility: HOSPITAL | Age: 79
Setting detail: HOSPITAL OUTPATIENT SURGERY
Discharge: HOME | End: 2024-05-10
Attending: PODIATRIST | Admitting: PODIATRIST
Payer: OTHER GOVERNMENT

## 2024-05-10 ENCOUNTER — ANESTHESIA (OUTPATIENT)
Dept: PERIOP | Facility: HOSPITAL | Age: 79
End: 2024-05-10
Payer: OTHER GOVERNMENT

## 2024-05-10 VITALS
WEIGHT: 198.19 LBS | BODY MASS INDEX: 28.37 KG/M2 | SYSTOLIC BLOOD PRESSURE: 174 MMHG | OXYGEN SATURATION: 98 % | DIASTOLIC BLOOD PRESSURE: 85 MMHG | RESPIRATION RATE: 16 BRPM | TEMPERATURE: 97 F | HEIGHT: 70 IN | HEART RATE: 89 BPM

## 2024-05-10 DIAGNOSIS — M20.5X2 HALLUX LIMITUS OF LEFT FOOT: Primary | ICD-10-CM

## 2024-05-10 LAB
GLUCOSE BLDC GLUCOMTR-MCNC: 127 MG/DL (ref 70–99)
GLUCOSE BLDC GLUCOMTR-MCNC: 97 MG/DL (ref 70–99)
GLUCOSE BLDC GLUCOMTR-MCNC: 99 MG/DL (ref 70–99)

## 2024-05-10 PROCEDURE — 82948 REAGENT STRIP/BLOOD GLUCOSE: CPT

## 2024-05-10 PROCEDURE — 25010000002 ONDANSETRON PER 1 MG

## 2024-05-10 PROCEDURE — 25010000002 PROPOFOL 200 MG/20ML EMULSION

## 2024-05-10 PROCEDURE — 25010000002 BUPIVACAINE (PF) 0.25 % SOLUTION: Performed by: PODIATRIST

## 2024-05-10 PROCEDURE — 25010000002 CLINDAMYCIN 900 MG/50ML SOLUTION: Performed by: PODIATRIST

## 2024-05-10 PROCEDURE — 25010000002 DEXAMETHASONE PER 1 MG

## 2024-05-10 PROCEDURE — 82948 REAGENT STRIP/BLOOD GLUCOSE: CPT | Performed by: ANESTHESIOLOGY

## 2024-05-10 PROCEDURE — 28750 FUSION OF BIG TOE JOINT: CPT | Performed by: PODIATRIST

## 2024-05-10 PROCEDURE — 25010000002 FENTANYL CITRATE (PF) 50 MCG/ML SOLUTION

## 2024-05-10 PROCEDURE — C1713 ANCHOR/SCREW BN/BN,TIS/BN: HCPCS | Performed by: PODIATRIST

## 2024-05-10 PROCEDURE — 25810000003 LACTATED RINGERS PER 1000 ML

## 2024-05-10 PROCEDURE — 25010000002 SUGAMMADEX 200 MG/2ML SOLUTION

## 2024-05-10 DEVICE — JAWS STAPLE, 12MM, 12 X 12 STRAIGHT
Type: IMPLANTABLE DEVICE | Site: FOOT | Status: FUNCTIONAL
Brand: JAWS STAPLE SYSTEM

## 2024-05-10 RX ORDER — SODIUM CHLORIDE, SODIUM LACTATE, POTASSIUM CHLORIDE, CALCIUM CHLORIDE 600; 310; 30; 20 MG/100ML; MG/100ML; MG/100ML; MG/100ML
INJECTION, SOLUTION INTRAVENOUS CONTINUOUS PRN
Status: DISCONTINUED | OUTPATIENT
Start: 2024-05-10 | End: 2024-05-10 | Stop reason: SURG

## 2024-05-10 RX ORDER — PROMETHAZINE HYDROCHLORIDE 12.5 MG/1
25 TABLET ORAL ONCE AS NEEDED
Status: DISCONTINUED | OUTPATIENT
Start: 2024-05-10 | End: 2024-05-10 | Stop reason: HOSPADM

## 2024-05-10 RX ORDER — PROPOFOL 10 MG/ML
INJECTION, EMULSION INTRAVENOUS AS NEEDED
Status: DISCONTINUED | OUTPATIENT
Start: 2024-05-10 | End: 2024-05-10 | Stop reason: SURG

## 2024-05-10 RX ORDER — PROMETHAZINE HYDROCHLORIDE 25 MG/1
25 SUPPOSITORY RECTAL ONCE AS NEEDED
Status: DISCONTINUED | OUTPATIENT
Start: 2024-05-10 | End: 2024-05-10 | Stop reason: HOSPADM

## 2024-05-10 RX ORDER — FENTANYL CITRATE 50 UG/ML
INJECTION, SOLUTION INTRAMUSCULAR; INTRAVENOUS AS NEEDED
Status: DISCONTINUED | OUTPATIENT
Start: 2024-05-10 | End: 2024-05-10 | Stop reason: SURG

## 2024-05-10 RX ORDER — MAGNESIUM HYDROXIDE 1200 MG/15ML
LIQUID ORAL AS NEEDED
Status: DISCONTINUED | OUTPATIENT
Start: 2024-05-10 | End: 2024-05-10 | Stop reason: HOSPADM

## 2024-05-10 RX ORDER — BUPIVACAINE HYDROCHLORIDE 2.5 MG/ML
INJECTION, SOLUTION EPIDURAL; INFILTRATION; INTRACAUDAL AS NEEDED
Status: DISCONTINUED | OUTPATIENT
Start: 2024-05-10 | End: 2024-05-10 | Stop reason: HOSPADM

## 2024-05-10 RX ORDER — CLINDAMYCIN PHOSPHATE 900 MG/50ML
900 INJECTION, SOLUTION INTRAVENOUS ONCE
Status: COMPLETED | OUTPATIENT
Start: 2024-05-10 | End: 2024-05-10

## 2024-05-10 RX ORDER — PHENYLEPHRINE HCL IN 0.9% NACL 1 MG/10 ML
SYRINGE (ML) INTRAVENOUS AS NEEDED
Status: DISCONTINUED | OUTPATIENT
Start: 2024-05-10 | End: 2024-05-10 | Stop reason: SURG

## 2024-05-10 RX ORDER — ONDANSETRON 2 MG/ML
INJECTION INTRAMUSCULAR; INTRAVENOUS AS NEEDED
Status: DISCONTINUED | OUTPATIENT
Start: 2024-05-10 | End: 2024-05-10 | Stop reason: SURG

## 2024-05-10 RX ORDER — OXYCODONE HYDROCHLORIDE 5 MG/1
5 TABLET ORAL
Status: DISCONTINUED | OUTPATIENT
Start: 2024-05-10 | End: 2024-05-10 | Stop reason: HOSPADM

## 2024-05-10 RX ORDER — ROCURONIUM BROMIDE 10 MG/ML
INJECTION, SOLUTION INTRAVENOUS AS NEEDED
Status: DISCONTINUED | OUTPATIENT
Start: 2024-05-10 | End: 2024-05-10 | Stop reason: SURG

## 2024-05-10 RX ORDER — DEXAMETHASONE SODIUM PHOSPHATE 4 MG/ML
INJECTION, SOLUTION INTRA-ARTICULAR; INTRALESIONAL; INTRAMUSCULAR; INTRAVENOUS; SOFT TISSUE AS NEEDED
Status: DISCONTINUED | OUTPATIENT
Start: 2024-05-10 | End: 2024-05-10 | Stop reason: SURG

## 2024-05-10 RX ORDER — SODIUM CHLORIDE, SODIUM LACTATE, POTASSIUM CHLORIDE, CALCIUM CHLORIDE 600; 310; 30; 20 MG/100ML; MG/100ML; MG/100ML; MG/100ML
9 INJECTION, SOLUTION INTRAVENOUS CONTINUOUS PRN
Status: DISCONTINUED | OUTPATIENT
Start: 2024-05-10 | End: 2024-05-10 | Stop reason: HOSPADM

## 2024-05-10 RX ORDER — HYDROCODONE BITARTRATE AND ACETAMINOPHEN 5; 325 MG/1; MG/1
1-2 TABLET ORAL EVERY 4 HOURS PRN
Qty: 30 TABLET | Refills: 0 | Status: SHIPPED | OUTPATIENT
Start: 2024-05-10

## 2024-05-10 RX ORDER — LIDOCAINE HYDROCHLORIDE 20 MG/ML
INJECTION, SOLUTION EPIDURAL; INFILTRATION; INTRACAUDAL; PERINEURAL AS NEEDED
Status: DISCONTINUED | OUTPATIENT
Start: 2024-05-10 | End: 2024-05-10 | Stop reason: SURG

## 2024-05-10 RX ORDER — ONDANSETRON 2 MG/ML
4 INJECTION INTRAMUSCULAR; INTRAVENOUS ONCE AS NEEDED
Status: DISCONTINUED | OUTPATIENT
Start: 2024-05-10 | End: 2024-05-10 | Stop reason: HOSPADM

## 2024-05-10 RX ORDER — EPHEDRINE SULFATE 50 MG/ML
INJECTION INTRAVENOUS AS NEEDED
Status: DISCONTINUED | OUTPATIENT
Start: 2024-05-10 | End: 2024-05-10 | Stop reason: SURG

## 2024-05-10 RX ADMIN — OXYCODONE 5 MG: 5 TABLET ORAL at 16:35

## 2024-05-10 RX ADMIN — Medication 100 MCG: at 15:00

## 2024-05-10 RX ADMIN — EPHEDRINE SULFATE 10 MG: 50 INJECTION INTRAVENOUS at 16:00

## 2024-05-10 RX ADMIN — CLINDAMYCIN PHOSPHATE 900 MG: 900 INJECTION, SOLUTION INTRAVENOUS at 14:55

## 2024-05-10 RX ADMIN — ROCURONIUM BROMIDE 40 MG: 10 INJECTION, SOLUTION INTRAVENOUS at 14:48

## 2024-05-10 RX ADMIN — SUGAMMADEX 200 MG: 100 INJECTION, SOLUTION INTRAVENOUS at 16:06

## 2024-05-10 RX ADMIN — PROPOFOL 30 MG: 10 INJECTION, EMULSION INTRAVENOUS at 15:54

## 2024-05-10 RX ADMIN — DEXAMETHASONE SODIUM PHOSPHATE 4 MG: 4 INJECTION, SOLUTION INTRAMUSCULAR; INTRAVENOUS at 14:55

## 2024-05-10 RX ADMIN — EPHEDRINE SULFATE 10 MG: 50 INJECTION INTRAVENOUS at 15:12

## 2024-05-10 RX ADMIN — FENTANYL CITRATE 25 MCG: 50 INJECTION, SOLUTION INTRAMUSCULAR; INTRAVENOUS at 16:08

## 2024-05-10 RX ADMIN — ONDANSETRON 4 MG: 2 INJECTION INTRAMUSCULAR; INTRAVENOUS at 15:49

## 2024-05-10 RX ADMIN — Medication 100 MCG: at 15:14

## 2024-05-10 RX ADMIN — PROPOFOL 120 MG: 10 INJECTION, EMULSION INTRAVENOUS at 14:48

## 2024-05-10 RX ADMIN — FENTANYL CITRATE 50 MCG: 50 INJECTION, SOLUTION INTRAMUSCULAR; INTRAVENOUS at 14:48

## 2024-05-10 RX ADMIN — FENTANYL CITRATE 25 MCG: 50 INJECTION, SOLUTION INTRAMUSCULAR; INTRAVENOUS at 16:06

## 2024-05-10 RX ADMIN — LIDOCAINE HYDROCHLORIDE 100 MG: 20 INJECTION, SOLUTION EPIDURAL; INFILTRATION; INTRACAUDAL; PERINEURAL at 14:48

## 2024-05-10 RX ADMIN — FENTANYL CITRATE 50 MCG: 50 INJECTION, SOLUTION INTRAMUSCULAR; INTRAVENOUS at 15:04

## 2024-05-10 RX ADMIN — PROPOFOL 30 MG: 10 INJECTION, EMULSION INTRAVENOUS at 15:51

## 2024-05-10 RX ADMIN — FENTANYL CITRATE 50 MCG: 50 INJECTION, SOLUTION INTRAMUSCULAR; INTRAVENOUS at 15:57

## 2024-05-10 RX ADMIN — SODIUM CHLORIDE, POTASSIUM CHLORIDE, SODIUM LACTATE AND CALCIUM CHLORIDE: 600; 310; 30; 20 INJECTION, SOLUTION INTRAVENOUS at 14:43

## 2024-05-10 NOTE — OP NOTE
Pre-Operative Diagnosis:  Left Hallux Rigidus    Post-Operative Diagnosis:  Same as pre-op diagnosis    Surgeon  Renato    Anesthesia  [facility] [place]    Procedure Performed/Technique  Arthrodesis of Left Hallux MPJ with Casting    Specimen/Tissue Removed:  None    Findings:    DJD in 1st MPJ    Complications:  No    Implants:  [place] 28 bone staples x 2    Injectables:  20 cc of 0.25% Marcaine plain    Hemostasis:  Left pneumatic calf tourniquet at 250 mmHg x 51 minutes.    Estimated Blood Loss:  None    Procedure:    DESCRIPTION OF PROCEDURE:  Written consent was obtained from the patient prior to any medications or sedation being administered.     Under mild sedation, the patient was brought to the operating room and placed on the operating table in the supine position.  A well padded calf tourniquet was placed about the patient's left calf. Following IV anesthesia, local anesthesia was obtained around the right first ray utilizing a total of 20 mL of 0.25% Marcaine plain.  The foot was then scrubbed, prepped and draped in the usual aseptic manner.  An Esmarch bandage was utilized to exsanguinate the patient's left foot with a total pressure of 250 mmHg.     Attention was directed to the left first metatarsal phalangeal joint where a 6 cm linear and longitudinal incision was made in the dorsal medial aspect first metatarsal phalangeal joint. An incision was made parallel to the first metatarsal phalangeal joint and involved the contour of the deformity. The incision was deepened through subcutaneous tissues using sharp and blunt dissection. Care was taken to identify and retract all vital and neurovascular structures.  All bleeders were ligated and cauterized as necessary.     At this time, a linear capsulotomy was performed over the metatarsal phalangeal joint.  The periosteal and capsule structures were then carefully dissected free of their osseous attachments and reflected dorsally and plantarly, thus exposing the  head of the first metatarsal site.     Next, utilizing a cup and cone metatarsal head reamer, the contour surface of the head of the metatarsal phalangeal joint and the base of the proximal phalanx were resected.     Using standard AO fixation, two [place] bone staples were utilized for arthrodesis of the first metatarsal phalangeal joint.    Arthrodesis was performed with the toe in approximately 10 degrees dorsiflexion of the metatarsal phalangeal joint.  Upon completion of the procedure, the position of the great toe was assessed and noted to be in good position.     The wound was flushed with copious amounts of sterile normal saline.     The capsular structures were reapproximated and coapted utilizing 3-0 Vicryl. The skin edges were approximated and coapted utilizing 3-0 nylon and utilizing a combination of horizontal mattress sutures.     Upon completion of the procedure, the tourniquet was deflated with prompt hyperemic response seen to toes one through five on the left foot with a total tourniquet time 51 minutes     The surgical site was dressed with Adaptic and covered with sterile compressive dressings consisting of Aquacel AG dressing, 4 x 4's, Kerlix and Coban. A posterior splint was then applied with the right foot rectus at 90 degrees at the ankle.    The surgery was performed with Zeenat Stallings RN, First Assist.  She performed as a first assist throughout the entire case with retracting, fixation, suturing, and postoperative dressing.     The patient tolerated the procedure and anesthesia well. The patient was transferred to the recovery room with vital signs stable and vascular status intact to toes one through five on the left foot.  Following a period of postoperative monitoring, the patient will be discharged home, having been given written and oral postoperative instructions. The patient is to contact  Dr. Gross for postoperative followup care and if any problems should arise.

## 2024-05-10 NOTE — DISCHARGE INSTRUCTIONS
DISCHARGE INSTRUCTIONS  PODIATRY SURGERY       For your surgery you had:  General anesthesia (you may have a sore throat for the first 24 hours)  Local anesthesia  You may experience dizziness, drowsiness, or light-headedness for several hours following surgery  Do not stay alone today or tonight.  Limit your activity for 24 hours.  Resume your diet slowly.  Follow whatever special dietary instructions you may have been given by the doctor.  You should not drive or operate machinery or drink alcohol for 24 hours or while you are taking pain medication.You should not sign any legally binding documents.  DO NOT TOUCH DRESSING.  You may shower: KEEP DRESSING DRY.  Sleep with the injured part elevated on a pillow.  Follow verbal instructions of your doctor.  Sit with the lower leg propped up on a footstool or chair with pillows.    SPECIAL INSTRUCTIONS:  See Dr. Gross's instructions below.    Last dose of pain medication was given at:  Pain pill last at 4:35pm. May take next at 8:35pm if needed.  May take ibuprofen next at anytime if able and needed. Ice bag to injured area for 72 hours.  Apply 20 minutes on - 20 minutes off.  Never place ice directly on skin or cast.     Bend knee and rotate ankle for 5 minutes every hour to support circulation.  DO NOT REMOVE DRESSING. KEEP DRESSING DRY. You may try to bathe in a tub, while keeping foot out of tub.  Small amount of bleeding through bandage may occur and is normal, unless it becomes heavy or persists, call office in this case.  Eat well balanced diet high in protein and vitamin c, may take supplemental vitamins and calcium. Drink plenty of fluids and get plenty of rest with foot elevated.  Use crutches, walker or cane for ambulation depending on weight bearing status. No driving until released by MD.    NOTIFY THE PHYSICIAN IF YOU EXPERIENCE:  Numbness of toes.  Inability to move toes.  Extreme coldness, paleness or blue dis-coloration of toes.  Excessive swelling  of affected surgical site or swelling that causes the cast to rub or cut into skin.  Pain unrelieved by pain medication  Nausea/vomiting not relieved by prescribed medication  Unable to urinate in 6 hours after surgery  Temperature greater than 101 degree Fahrenheit or chills  If unable to reach your doctor, please go to the closest emergency room         [facility] Post-Surgical Instructions    Go directly home and try to keep your feet elevated by sitting in the back seat of the car and placing your foot on the seat. Have your prescriptions filled immediately.    Elevate feet above the level of the heart by supporting your feet and legs with pillows. Lying on a couch with 3-4 pillows works well. Elevation helps reduce swelling and should be used whenever you are resting.    Place an ice bag covered with a towel on your ankle area and/or behind your knee for no longer than 20 minutes, then keep ice off of foot for at least 20 minutes. The best way to remember this is 20 minutes on, then 20 minutes off. Continue this for the first week while awake. Ice helps to reduce swelling and inflammation. Do NOT sleep with ice on your foot or leg.    To promote circulation and healing, bend your knee and rotate your foot and ankle for 5 minutes each hour. Dangle your feet down for 1-2 minutes at least once per hour, then continue to elevate.    Keep the bandages or cast clean and dry. Do NOT remove your bandages under any circumstances without consulting Dr. Gross. You may bathe by hanging your foot outside of the tub, but DO NOT attempt to shower.    Take your pain medication only as directed. Avoid alcoholwhile taking medication. If you experience nausea or lightheadedness, remain lying down and contact the office. You may prefer to use aspirin Tylenol, Motrin or other over the counter pain reliever.    A small amount of bleeding through the bandage may occur and should not cause concern unless it  becomes heavy or persists. If this happens, contact the office. Do not become alarmed if you notice a bruised appearance to your feet or toes.    Eat a well-balanced diet high in protein and vitamin C. Take supplemental vitamins and calcium. Drink plenty of fluids and get plenty of rest with your feet elevated.    Blankets may be kept from irritating the surgical site by using a large cardboard box to cradle the covers over the feet.    Use of crutches, a walker, or a cane can be useful in public areas to protect your feet. Do not attempt to operate a motor vehicle until directed by Dr. Gross.    Call the office if any of the following occur: bleeding that won't stop, injury to foot, fever, wet bandages, redness with throbbing, and pain unrelieved by medication.

## 2024-05-14 ENCOUNTER — OFFICE VISIT (OUTPATIENT)
Dept: PODIATRY | Facility: CLINIC | Age: 79
End: 2024-05-14
Payer: OTHER GOVERNMENT

## 2024-05-14 VITALS
SYSTOLIC BLOOD PRESSURE: 156 MMHG | HEART RATE: 80 BPM | DIASTOLIC BLOOD PRESSURE: 84 MMHG | BODY MASS INDEX: 28.41 KG/M2 | TEMPERATURE: 97.7 F | WEIGHT: 198 LBS | OXYGEN SATURATION: 95 %

## 2024-05-14 DIAGNOSIS — M20.5X2 HALLUX LIMITUS OF LEFT FOOT: Primary | ICD-10-CM

## 2024-05-14 DIAGNOSIS — E11.8 DIABETIC FOOT: ICD-10-CM

## 2024-05-14 DIAGNOSIS — G62.2 POLYNEUROPATHY DUE TO OTHER TOXIC AGENTS: ICD-10-CM

## 2024-05-14 DIAGNOSIS — E11.9 NON-INSULIN DEPENDENT TYPE 2 DIABETES MELLITUS: ICD-10-CM

## 2024-05-14 DIAGNOSIS — G62.9 NEUROPATHY: ICD-10-CM

## 2024-05-14 DIAGNOSIS — Z77.098 AGENT ORANGE EXPOSURE: ICD-10-CM

## 2024-05-14 DIAGNOSIS — M79.672 FOOT PAIN, LEFT: ICD-10-CM

## 2024-05-14 PROCEDURE — 99024 POSTOP FOLLOW-UP VISIT: CPT | Performed by: PODIATRIST

## 2024-05-14 PROCEDURE — 29405 APPL SHORT LEG CAST: CPT | Performed by: PODIATRIST

## 2024-05-14 NOTE — PROGRESS NOTES
Answers submitted by the patient for this visit:  Primary Reason for Visit (Submitted on 3/7/2024)  What is the primary reason for your visit?: Other  Other (Submitted on 3/7/2024)  Please describe your symptoms.:  arthritis pain left foot  Have you had these symptoms before?: No  How long have you been having these symptoms?: Greater than 2 weeks  Please list any medications you are currently taking for this condition.: ibuprofen 800mg      [facility] - PODIATRY    Today's Date: 05/14/24    Patient Name: Sajan Hermosillo  MRN: [id number]  CSN: [id number]  PCP: Juarez Arrington MD, Last PCP Visit:  6/28/2023  Referring Provider: Anastasiia Xiong MD    SUBJECTIVE     Chief Complaint   Patient presents with    Left Foot - Post-op     1st post op visit, surgery 5/10/24, done for hallux limitus of left foot     HPI: Sajan Hermosillo, a 79 y.o.male, comes presents to clinic:    Procedure: Left first metatarsal phalangeal joint arthrodesis  Date: 10 May 2024    Patient states they are doing well without complications.  Patient states they are following post-op instructions.  Patient states pain is controlled.      Patient denies any fevers, chills, nausea, vomiting, shortness of breathe, nor any other constitutional signs nor symptoms.      Past Medical History:   Diagnosis Date    Abdominal pain     Ankle pain     Arthritis     Coronary artery disease     Fracture     Heel pain     High cholesterol     HL (hearing loss)     HTN (hypertension)     Hyperlipemia     Ingrowing toenail     Limb swelling     Neuropathy in diabetes 2021    Non-rheumatic aortic stenosis     SEES DR LALA    Plantar fasciitis 2004    Pneumonia June 22 2023    NONE CURRENTLY    Prostate disorder     Seasonal allergies     Sleep apnea     Stroke 2008    NO RESIDUAL    Type 2 diabetes mellitus     BG      Past Surgical History:   Procedure Laterality Date    CAST APPLICATION Left 5/10/2024    Procedure: CAST APPLICATION  LEG;  Surgeon: Jeff Gross DPM;  Location: [facility] MAIN OR;  Service: Podiatry;  Laterality: Left;    COLONOSCOPY  2019    Dr. Gomes    COLONOSCOPY N/A 2022    Procedure: COLONOSCOPY;  Surgeon: Robert Koo MD;  Location: [facility] ENDOSCOPY;  Service: Gastroenterology;  Laterality: N/A;  HEMORRHOIDS, DIVERTICULOSIS    ENDOSCOPY      Dr. Gomes    ENDOSCOPY N/A 2022    Procedure: ESOPHAGOGASTRODUODENOSCOPY with biopsy;  Surgeon: Robert Koo MD;  Location: [facility] ENDOSCOPY;  Service: Gastroenterology;  Laterality: N/A;  SMALL HIATAL HERNIA    FOOT FRACTURE SURGERY      FOOT SURGERY Right     JOINT REPLACEMENT      MTP JOINT FUSION Left 5/10/2024    Procedure: LEFT GREAT TOE JOINT FUSION;  Surgeon: Jeff Gross DPM;  Location: [facility] MAIN OR;  Service: Podiatry;  Laterality: Left;    OTHER SURGICAL HISTORY      Artificial joints/limbs    OTHER SURGICAL HISTORY      Metal Implants    REPLACEMENT TOTAL KNEE Left      Family History   Problem Relation Age of Onset    Heart disease Mother     Diabetes Mother         unspecified type    Arthritis Mother     Heart disease Father     Arthritis Father     Heart disease Sister     Diabetes Sister         unspecified type    Heart disease Brother     Arthritis Brother     Diabetes Sister     Colon cancer Neg Hx     Malig Hyperthermia Neg Hx      Social History     Socioeconomic History    Marital status:    Tobacco Use    Smoking status: Former     Current packs/day: 0.00     Average packs/day: 2.0 packs/day for 12.6 years (25.2 ttl pk-yrs)     Types: Cigarettes, Pipe, Cigars     Start date: 1963     Quit date: 1975     Years since quittin.8     Passive exposure: Past    Smokeless tobacco: Former   Vaping Use    Vaping status: Never Used   Substance and Sexual Activity    Alcohol use: Not Currently    Drug use: Never    Sexual activity: Defer     Partners: Female     Birth control/protection:  Surgical, Tubal ligation     Allergies   Allergen Reactions    Hydrocodone Rash    Hydrocodone-Acetaminophen Rash     PT CAN TAKE ACETAMINOPHEN AND IBUPROFEN    Hydrocodone-Ibuprofen Rash    Oxycodone Rash    Penicillins Rash     Current Outpatient Medications   Medication Sig Dispense Refill    aspirin 81 MG EC tablet Take 1 tablet by mouth Daily. TAKES FOR HEART HEALTH  LAST DOSE TAKEN 5-7-24      atorvastatin (LIPITOR) 40 MG tablet atorvastatin 40 mg oral tablet take 1 tablet (40 mg) by oral route once daily   Active      carboxymethylcellulose (REFRESH PLUS) 0.5 % solution 1 drop.      esomeprazole (nexIUM) 40 MG capsule Nexium 40 mg oral capsule,delayed release(DR/EC) take 1 capsule (40 mg) by oral route once daily   Active      fluticasone (Flonase Allergy Relief) 50 MCG/ACT nasal spray 1 spray into the nostril(s) as directed by provider Daily.      HYDROcodone-acetaminophen (NORCO) 5-325 MG per tablet Take 1-2 tablets by mouth Every 4 (Four) Hours As Needed (Pain). 30 tablet 0    Ibuprofen 3 %, Gabapentin 10 %, Baclofen 2 %, lidocaine 4 %, Ketamine HCl 4 % Apply 1-2 g topically to the appropriate area as directed 3 (Three) to 4 (Four) times daily. 90 g 5    ketoconazole (NIZORAL) 2 % cream ketoconazole 2 % topical cream apply to the affected area(s) by topical route once daily   Suspended      lisinopril (PRINIVIL,ZESTRIL) 20 MG tablet lisinopril 20 mg oral tablet take 1 tablet (20 mg) by oral route once daily   Active      metFORMIN (GLUCOPHAGE) 1000 MG tablet Take 1 tablet by mouth Daily. INSTRUCTED PT TO TAKE BY 6PM THE NIGHT PRIOR TO SURGERY AND DO NOT TAKE DAY OF SURGERY      ondansetron ODT (ZOFRAN-ODT) 4 MG disintegrating tablet Place 1 tablet on the tongue 4 (Four) Times a Day As Needed for Nausea or Vomiting. 20 tablet 0    pramipexole (MIRAPEX) 0.5 MG tablet Take 1 tablet by mouth Daily.      Semaglutide, 1 MG/DOSE, (OZEMPIC) 2 MG/1.5ML solution pen-injector Inject 2 mg under the skin into the  appropriate area as directed 1 (One) Time Per Week. INSTRUCTED PT TO STOP 7 DAYS PRIOR TO SURGERY, LAST DOSE 5-2-24       No current facility-administered medications for this visit.     Review of Systems   Constitutional: Negative.    Musculoskeletal:         P/O Left 1st MPJ Arthrodeisis   Neurological:  Positive for numbness.   All other systems reviewed and are negative.      OBJECTIVE     Vitals:    05/14/24 1230   BP: 156/84   Pulse: 80   Temp: 97.7 °F (36.5 °C)   SpO2: 95%       Body mass index is 28.41 kg/m².    Lab Results   Component Value Date    HGBA1C 6.00 (H) 09/27/2023       Lab Results   Component Value Date    GLUCOSE 105 (H) 09/25/2023    CALCIUM 9.7 09/25/2023     09/25/2023    K 5.1 09/25/2023    CO2 23.6 09/25/2023     09/25/2023    BUN 18 09/25/2023    CREATININE 1.07 09/25/2023    BCR 16.8 09/25/2023    ANIONGAP 9.4 09/25/2023       Patient seen in no apparent distress.      PHYSICAL EXAM:     Foot/Ankle Exam    GENERAL  Appearance:  elderly  Orientation:  AAOx3  Affect:  appropriate  Gait:  unimpaired  Assistance:  independent  Right shoe gear: casual shoe    VASCULAR     Right Foot Vascularity   Normal vascular exam    Dorsalis pedis:  2+  Posterior tibial:  2+  Skin temperature:  warm  Edema grading:  None  CFT:  < 3 seconds  Pedal hair growth:  Absent  Varicosities:  none     Left Foot Vascularity   Normal vascular exam    Dorsalis pedis:  2+  Posterior tibial:  2+  Skin temperature:  warm  Edema grading:  None  CFT:  < 3 seconds  Pedal hair growth:  Absent  Varicosities:  none     NEUROLOGIC     Right Foot Neurologic   Light touch sensation: diminished  Vibratory sensation: diminished  Hot/Cold sensation: diminished  Protective Sensation using [place]-Krishna Monofilament:   Sites intact: 6  Sites tested: 10     Left Foot Neurologic   Light touch sensation: diminished  Vibratory sensation: diminished  Hot/Cold sensation:  diminished  Protective Sensation using  [place]-Krishna Monofilament:   Sites intact: 6  Sites tested: 10    MUSCULOSKELETAL     Left Foot Musculoskeletal   Hallux limitus: Yes      MUSCLE STRENGTH     Right Foot Muscle Strength   Foot dorsiflexion:  4  Foot plantar flexion:  4  Foot inversion:  4  Foot eversion:  4     Left Foot Muscle Strength   Foot dorsiflexion:  4  Foot plantar flexion:  4  Foot inversion:  4  Foot eversion:  4    RANGE OF MOTION     Right Foot Range of Motion   Foot and ankle ROM within normal limits    Ankle dorsiflexion: Right ankle active dorsiflexion: No range of motion of Chopart's and subtalar joints consistent with past surgical history of fusions of these joints.     Left Foot Range of Motion   Foot and ankle ROM within normal limits      DERMATOLOGIC      Right Foot Dermatologic   Skin  Right foot skin is intact.      Left Foot Dermatologic   Skin  Left foot skin is intact.      Left foot additional comments: Left foot shows posterior splint and dressing are dry and intact without signs of breakthrough.  First ray shows sutures intact with skin edges well-coapted with no signs of dehiscence.  Healthy surgical skin edges.  No drainage present.  No edema, erythema, calor, lymphangitis, nor signs of infection seen.  Hallux in corrected rectus position.    XR Foot 3+ View Left    Result Date: 5/14/2024  Narrative: IN-OFFICE IMAGING:  Standing, weightbearing, 3 view, AP, MO, Lateral, Left foot Indication: Postoperative Findings: Arthrodesis of first metatarsal phalangeal joint.  2 bone staples are seen in original postoperative position.  Hallux in corrected rectus position. Comparison: No other changes seen compared to previous views.     ASSESSMENT/PLAN     Diagnoses and all orders for this visit:    1. Hallux limitus of left foot (Primary)    2. Diabetic foot    3. Agent orange exposure    4. Non-insulin dependent type 2 diabetes mellitus    5. Foot pain, left    6. 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    Discussed findings and treatment plan including risks, benefits, and treatment options with patient in detail. Patient agreed with treatment plan.    Extremity:  Left leg, Short Leg cast.    Patient was placed in fiberglass cast today. The patient tolerated the procedure without any complications., Neurovascular status was evaluated post cast application and was within normal limits. The cast was not causing impingement on neuro-vasculature or vital structures.    Patient is to not weight bear to the affected foot at this time.  Patient states understanding and agreement with this plan.    An After Visit Summary was printed and given to the patient at discharge, including (if requested) any available informative/educational handouts regarding diagnosis, treatment, or medications. All questions were answered to patient/family satisfaction. Should symptoms fail to improve or worsen they agree to call or return to clinic or to go to the Emergency Department. Discussed the importance of following up with any needed screening tests/labs/specialist appointments and any requested follow-up recommended by me today. Importance of maintaining follow-up discussed and patient accepts that missed appointments can delay diagnosis and potentially lead to worsening of conditions.    Return in about 3 weeks (around 6/4/2024) for Post Operative, Cast change, X-ray needed, Suture Removal., or sooner if acute issues arise.    This document has been electronically signed by Jeff Gross DPM on May 14, 2024 13:08 EDT

## 2024-06-05 ENCOUNTER — OFFICE VISIT (OUTPATIENT)
Dept: PODIATRY | Facility: CLINIC | Age: 79
End: 2024-06-05
Payer: OTHER GOVERNMENT

## 2024-06-05 VITALS
TEMPERATURE: 97.5 F | HEIGHT: 70 IN | HEART RATE: 66 BPM | WEIGHT: 198 LBS | BODY MASS INDEX: 28.35 KG/M2 | SYSTOLIC BLOOD PRESSURE: 118 MMHG | DIASTOLIC BLOOD PRESSURE: 80 MMHG | OXYGEN SATURATION: 96 %

## 2024-06-05 DIAGNOSIS — E11.8 DIABETIC FOOT: ICD-10-CM

## 2024-06-05 DIAGNOSIS — G62.9 NEUROPATHY: ICD-10-CM

## 2024-06-05 DIAGNOSIS — G62.2 POLYNEUROPATHY DUE TO OTHER TOXIC AGENTS: ICD-10-CM

## 2024-06-05 DIAGNOSIS — M79.672 FOOT PAIN, LEFT: ICD-10-CM

## 2024-06-05 DIAGNOSIS — M20.5X2 HALLUX LIMITUS OF LEFT FOOT: Primary | ICD-10-CM

## 2024-06-05 DIAGNOSIS — Z77.098 AGENT ORANGE EXPOSURE: ICD-10-CM

## 2024-06-05 DIAGNOSIS — E11.9 NON-INSULIN DEPENDENT TYPE 2 DIABETES MELLITUS: ICD-10-CM

## 2024-06-05 NOTE — PROGRESS NOTES
[facility] - PODIATRY    Today's Date: 06/05/24    Patient Name: Sajan Hermosillo  MRN: [id number]  CSN: [id number]  PCP: Juarez Arrington MD, Last PCP Visit:  6/28/2023  Referring Provider: No ref. provider found    SUBJECTIVE     Chief Complaint   Patient presents with    Left Foot - Post-op Follow-up     Here for cast change   walking on cast     HPI: Sajan Hermosillo, a 79 y.o.male, comes presents to clinic:    Procedure: Left first metatarsal phalangeal joint arthrodesis  Date: 10 May 2024    Patient states they are doing well without complications.  Patient states they are following post-op instructions.  Patient states pain is controlled.      Patient denies any fevers, chills, nausea, vomiting, shortness of breathe, nor any other constitutional signs nor symptoms.      Past Medical History:   Diagnosis Date    Abdominal pain     Ankle pain     Arthritis     Coronary artery disease     Fracture     Heel pain     High cholesterol     HL (hearing loss)     HTN (hypertension)     Hyperlipemia     Ingrowing toenail     Limb swelling     Neuropathy in diabetes 2021    Non-rheumatic aortic stenosis     SEES DR LALA    Plantar fasciitis 2004    Pneumonia June 22 2023    NONE CURRENTLY    Prostate disorder     Seasonal allergies     Sleep apnea     Stroke 2008    NO RESIDUAL    Type 2 diabetes mellitus     BG      Past Surgical History:   Procedure Laterality Date    CAST APPLICATION Left 5/10/2024    Procedure: CAST APPLICATION LEG;  Surgeon: Jeff Gross DPM;  Location: [facility] MAIN OR;  Service: Podiatry;  Laterality: Left;    COLONOSCOPY  2019    Dr. Gomes    COLONOSCOPY N/A 08/22/2022    Procedure: COLONOSCOPY;  Surgeon: Robert Koo MD;  Location: [facility] ENDOSCOPY;  Service: Gastroenterology;  Laterality: N/A;  HEMORRHOIDS, DIVERTICULOSIS    ENDOSCOPY  2019    Dr. Gomes    ENDOSCOPY N/A 08/22/2022    Procedure: ESOPHAGOGASTRODUODENOSCOPY with biopsy;   Surgeon: Robert Koo MD;  Location: [facility] ENDOSCOPY;  Service: Gastroenterology;  Laterality: N/A;  SMALL HIATAL HERNIA    FOOT FRACTURE SURGERY      FOOT SURGERY Right     JOINT REPLACEMENT      MTP JOINT FUSION Left 5/10/2024    Procedure: LEFT GREAT TOE JOINT FUSION;  Surgeon: Jeff Gross DPM;  Location: [facility] MAIN OR;  Service: Podiatry;  Laterality: Left;    OTHER SURGICAL HISTORY      Artificial joints/limbs    OTHER SURGICAL HISTORY      Metal Implants    REPLACEMENT TOTAL KNEE Left      Family History   Problem Relation Age of Onset    Heart disease Mother     Diabetes Mother         unspecified type    Arthritis Mother     Heart disease Father     Arthritis Father     Heart disease Sister     Diabetes Sister         unspecified type    Heart disease Brother     Arthritis Brother     Diabetes Sister     Colon cancer Neg Hx     Malig Hyperthermia Neg Hx      Social History     Socioeconomic History    Marital status:    Tobacco Use    Smoking status: Former     Current packs/day: 0.00     Average packs/day: 2.0 packs/day for 12.6 years (25.2 ttl pk-yrs)     Types: Cigarettes, Pipe, Cigars     Start date: 1963     Quit date: 1975     Years since quittin.8     Passive exposure: Past    Smokeless tobacco: Former   Vaping Use    Vaping status: Never Used   Substance and Sexual Activity    Alcohol use: Not Currently    Drug use: Never    Sexual activity: Yes     Partners: Female     Birth control/protection: Tubal ligation, Surgical     Allergies   Allergen Reactions    Hydrocodone Rash    Hydrocodone-Acetaminophen Rash     PT CAN TAKE ACETAMINOPHEN AND IBUPROFEN    Hydrocodone-Ibuprofen Rash    Oxycodone Rash    Penicillins Rash     Current Outpatient Medications   Medication Sig Dispense Refill    atorvastatin (LIPITOR) 40 MG tablet atorvastatin 40 mg oral tablet take 1 tablet (40 mg) by oral route once daily   Active      carboxymethylcellulose (REFRESH PLUS)  0.5 % solution 1 drop.      esomeprazole (nexIUM) 40 MG capsule Nexium 40 mg oral capsule,delayed release(DR/EC) take 1 capsule (40 mg) by oral route once daily   Active      fluticasone (Flonase Allergy Relief) 50 MCG/ACT nasal spray 1 spray into the nostril(s) as directed by provider Daily.      Ibuprofen 3 %, Gabapentin 10 %, Baclofen 2 %, lidocaine 4 %, Ketamine HCl 4 % Apply 1-2 g topically to the appropriate area as directed 3 (Three) to 4 (Four) times daily. 90 g 5    ketoconazole (NIZORAL) 2 % cream ketoconazole 2 % topical cream apply to the affected area(s) by topical route once daily   Suspended      lisinopril (PRINIVIL,ZESTRIL) 20 MG tablet lisinopril 20 mg oral tablet take 1 tablet (20 mg) by oral route once daily   Active      metFORMIN (GLUCOPHAGE) 1000 MG tablet Take 1 tablet by mouth Daily. INSTRUCTED PT TO TAKE BY 6PM THE NIGHT PRIOR TO SURGERY AND DO NOT TAKE DAY OF SURGERY      pramipexole (MIRAPEX) 0.5 MG tablet Take 1 tablet by mouth Daily.      Semaglutide, 1 MG/DOSE, (OZEMPIC) 2 MG/1.5ML solution pen-injector Inject 2 mg under the skin into the appropriate area as directed 1 (One) Time Per Week. INSTRUCTED PT TO STOP 7 DAYS PRIOR TO SURGERY, LAST DOSE 5-2-24      HYDROcodone-acetaminophen (NORCO) 5-325 MG per tablet Take 1-2 tablets by mouth Every 4 (Four) Hours As Needed (Pain). (Patient not taking: Reported on 6/5/2024) 30 tablet 0    ondansetron ODT (ZOFRAN-ODT) 4 MG disintegrating tablet Place 1 tablet on the tongue 4 (Four) Times a Day As Needed for Nausea or Vomiting. (Patient not taking: Reported on 6/5/2024) 20 tablet 0     No current facility-administered medications for this visit.     Review of Systems   Constitutional: Negative.    Musculoskeletal:         P/O Left 1st MPJ Arthrodeisis   Neurological:  Positive for numbness.   All other systems reviewed and are negative.      OBJECTIVE     Vitals:    06/05/24 1244   BP: 118/80   Pulse: 66   Temp: 97.5 °F (36.4 °C)   SpO2: 96%        Body mass index is 28.41 kg/m².    Lab Results   Component Value Date    HGBA1C 6.00 (H) 09/27/2023       Lab Results   Component Value Date    GLUCOSE 105 (H) 09/25/2023    CALCIUM 9.7 09/25/2023     09/25/2023    K 5.1 09/25/2023    CO2 23.6 09/25/2023     09/25/2023    BUN 18 09/25/2023    CREATININE 1.07 09/25/2023    BCR 16.8 09/25/2023    ANIONGAP 9.4 09/25/2023       Patient seen in no apparent distress.      PHYSICAL EXAM:     Foot/Ankle Exam    GENERAL  Appearance:  elderly  Orientation:  AAOx3  Affect:  appropriate  Gait:  unimpaired  Assistance:  independent  Right shoe gear: casual shoe    VASCULAR     Right Foot Vascularity   Normal vascular exam    Dorsalis pedis:  2+  Posterior tibial:  2+  Skin temperature:  warm  Edema grading:  None  CFT:  < 3 seconds  Pedal hair growth:  Absent  Varicosities:  none     Left Foot Vascularity   Normal vascular exam    Dorsalis pedis:  2+  Posterior tibial:  2+  Skin temperature:  warm  Edema grading:  None  CFT:  < 3 seconds  Pedal hair growth:  Absent  Varicosities:  none     NEUROLOGIC     Right Foot Neurologic   Light touch sensation: diminished  Vibratory sensation: diminished  Hot/Cold sensation: diminished  Protective Sensation using [place]-Krishna Monofilament:   Sites intact: 6  Sites tested: 10     Left Foot Neurologic   Light touch sensation: diminished  Vibratory sensation: diminished  Hot/Cold sensation:  diminished  Protective Sensation using [place]-Krishna Monofilament:   Sites intact: 6  Sites tested: 10    MUSCULOSKELETAL     Left Foot Musculoskeletal   Hallux limitus: Yes      MUSCLE STRENGTH     Right Foot Muscle Strength   Foot dorsiflexion:  4  Foot plantar flexion:  4  Foot inversion:  4  Foot eversion:  4     Left Foot Muscle Strength   Foot dorsiflexion:  4  Foot plantar flexion:  4  Foot inversion:  4  Foot eversion:  4    RANGE OF MOTION     Right Foot Range of Motion   Foot and ankle ROM within normal limits    Ankle  dorsiflexion: Right ankle active dorsiflexion: No range of motion of Chopart's and subtalar joints consistent with past surgical history of fusions of these joints.     Left Foot Range of Motion   Foot and ankle ROM within normal limits      DERMATOLOGIC      Right Foot Dermatologic   Skin  Right foot skin is intact.      Left Foot Dermatologic   Skin  Left foot skin is intact.      Left foot additional comments: Left foot shows below-knee fiberglass cast and dressing are dry and intact without signs of breakthrough.  First ray shows sutures intact with skin edges well-coapted with no signs of dehiscence.  Healthy surgical skin edges.  No drainage present.  No edema, erythema, calor, lymphangitis, nor signs of infection seen.  Hallux in corrected rectus position.    Upon removal of sutures, skin edges remain well-coapted with no signs of dehiscence.    XR Foot 3+ View Left    Result Date: 6/5/2024  Narrative: IN-OFFICE IMAGING: Standing, weightbearing, 3 view, AP, MO, Lateral, Left foot Indication: Postoperative Findings: Arthrodesis of first metatarsal phalangeal joint. 2 bone staples are seen in original postoperative position. Hallux in corrected rectus position.  Early trabeculation seen across arthrodesis site of the first metatarsal phalangeal joint.  Comparison: No other changes seen compared to previous views.     XR Foot 3+ View Left    Result Date: 5/14/2024  Narrative: IN-OFFICE IMAGING:  Standing, weightbearing, 3 view, AP, MO, Lateral, Left foot Indication: Postoperative Findings: Arthrodesis of first metatarsal phalangeal joint.  2 bone staples are seen in original postoperative position.  Hallux in corrected rectus position. Comparison: No other changes seen compared to previous views.     ASSESSMENT/PLAN     Diagnoses and all orders for this visit:    1. Hallux limitus of left foot (Primary)    2. Diabetic foot    3. Agent orange exposure    4. Non-insulin dependent type 2 diabetes mellitus    5.  Polyneuropathy due to other toxic agents    6. Foot pain, left    7. Neuropathy    Comprehensive lower extremity examination and evaluation was performed.    Discussed findings and treatment plan including risks, benefits, and treatment options with patient in detail. Patient agreed with treatment plan.    Extremity:  Left leg, Short Leg cast.    Patient was placed in fiberglass cast today. The patient tolerated the procedure without any complications., Neurovascular status was evaluated post cast application and was within normal limits. The cast was not causing impingement on neuro-vasculature or vital structures.    Patient is to not weight bear to the affected foot at this time.  Patient states understanding and agreement with this plan.    An After Visit Summary was printed and given to the patient at discharge, including (if requested) any available informative/educational handouts regarding diagnosis, treatment, or medications. All questions were answered to patient/family satisfaction. Should symptoms fail to improve or worsen they agree to call or return to clinic or to go to the Emergency Department. Discussed the importance of following up with any needed screening tests/labs/specialist appointments and any requested follow-up recommended by me today. Importance of maintaining follow-up discussed and patient accepts that missed appointments can delay diagnosis and potentially lead to worsening of conditions.    Return in about 3 weeks (around 6/26/2024) for Post Operative, Cast removal, X-ray needed., or sooner if acute issues arise.    This document has been electronically signed by Jeff Gross DPM on June 5, 2024 13:19 EDT

## 2024-06-26 ENCOUNTER — OFFICE VISIT (OUTPATIENT)
Dept: PODIATRY | Facility: CLINIC | Age: 79
End: 2024-06-26
Payer: OTHER GOVERNMENT

## 2024-06-26 VITALS
SYSTOLIC BLOOD PRESSURE: 146 MMHG | DIASTOLIC BLOOD PRESSURE: 70 MMHG | HEART RATE: 82 BPM | OXYGEN SATURATION: 98 % | TEMPERATURE: 97.8 F | WEIGHT: 198 LBS | BODY MASS INDEX: 28.35 KG/M2 | HEIGHT: 70 IN

## 2024-06-26 DIAGNOSIS — Z77.098 AGENT ORANGE EXPOSURE: ICD-10-CM

## 2024-06-26 DIAGNOSIS — E11.8 DIABETIC FOOT: ICD-10-CM

## 2024-06-26 DIAGNOSIS — G62.2 POLYNEUROPATHY DUE TO OTHER TOXIC AGENTS: ICD-10-CM

## 2024-06-26 DIAGNOSIS — M79.672 FOOT PAIN, LEFT: ICD-10-CM

## 2024-06-26 DIAGNOSIS — G62.9 NEUROPATHY: ICD-10-CM

## 2024-06-26 DIAGNOSIS — E11.9 NON-INSULIN DEPENDENT TYPE 2 DIABETES MELLITUS: ICD-10-CM

## 2024-06-26 DIAGNOSIS — M20.5X2 HALLUX LIMITUS OF LEFT FOOT: Primary | ICD-10-CM

## 2024-06-26 NOTE — PROGRESS NOTES
[facility] - PODIATRY    Today's Date: 06/26/24    Patient Name: Sajan Hermosillo  MRN: [id number]  CSN: [id number]  PCP: Juarez Arrington MD, Last PCP Visit:  6/28/2023  Referring Provider: No ref. provider found    SUBJECTIVE     Chief Complaint   Patient presents with    Left Foot - Post-op, Follow-up     Hallux limitus of left foot ,here for cast removal and xray     HPI: Sajan Hermosillo, a 79 y.o.male, comes presents to clinic:    Procedure: Left first metatarsal phalangeal joint arthrodesis  Date: 10 May 2024    Patient states they are doing well without complications.  Patient states they are following post-op instructions.  Patient states pain is controlled.      Patient states being pleased with postop results thus far.    Patient denies any fevers, chills, nausea, vomiting, shortness of breathe, nor any other constitutional signs nor symptoms.      Past Medical History:   Diagnosis Date    Abdominal pain     Ankle pain     Arthritis     Coronary artery disease     Fracture     Heel pain     High cholesterol     HL (hearing loss)     HTN (hypertension)     Hyperlipemia     Ingrowing toenail     Limb swelling     Neuropathy in diabetes 2021    Non-rheumatic aortic stenosis     SEES DR LALA    Plantar fasciitis 2004    Pneumonia June 22 2023    NONE CURRENTLY    Prostate disorder     Seasonal allergies     Sleep apnea     Stroke 2008    NO RESIDUAL    Type 2 diabetes mellitus     BG      Past Surgical History:   Procedure Laterality Date    CAST APPLICATION Left 5/10/2024    Procedure: CAST APPLICATION LEG;  Surgeon: Jeff Gross DPM;  Location: [facility] MAIN OR;  Service: Podiatry;  Laterality: Left;    COLONOSCOPY  2019    Dr. Gomes    COLONOSCOPY N/A 08/22/2022    Procedure: COLONOSCOPY;  Surgeon: Robert Koo MD;  Location: formerly [facility] ENDOSCOPY;  Service: Gastroenterology;  Laterality: N/A;  HEMORRHOIDS, DIVERTICULOSIS    ENDOSCOPY  2019    Dr. Gomes     ENDOSCOPY N/A 2022    Procedure: ESOPHAGOGASTRODUODENOSCOPY with biopsy;  Surgeon: Robert Koo MD;  Location: [facility] ENDOSCOPY;  Service: Gastroenterology;  Laterality: N/A;  SMALL HIATAL HERNIA    FOOT FRACTURE SURGERY      FOOT SURGERY Right     JOINT REPLACEMENT      MTP JOINT FUSION Left 5/10/2024    Procedure: LEFT GREAT TOE JOINT FUSION;  Surgeon: Jeff Gross DPM;  Location: [facility] MAIN OR;  Service: Podiatry;  Laterality: Left;    OTHER SURGICAL HISTORY      Artificial joints/limbs    OTHER SURGICAL HISTORY      Metal Implants    REPLACEMENT TOTAL KNEE Left      Family History   Problem Relation Age of Onset    Heart disease Mother     Diabetes Mother         unspecified type    Arthritis Mother     Heart disease Father     Arthritis Father     Heart disease Sister     Diabetes Sister         unspecified type    Heart disease Brother     Arthritis Brother     Diabetes Sister     Colon cancer Neg Hx     Malig Hyperthermia Neg Hx      Social History     Socioeconomic History    Marital status:    Tobacco Use    Smoking status: Former     Current packs/day: 0.00     Average packs/day: 2.0 packs/day for 12.6 years (25.2 ttl pk-yrs)     Types: Cigarettes, Pipe, Cigars     Start date: 1963     Quit date: 1975     Years since quittin.9     Passive exposure: Past    Smokeless tobacco: Former   Vaping Use    Vaping status: Never Used   Substance and Sexual Activity    Alcohol use: Not Currently    Drug use: Never    Sexual activity: Yes     Partners: Female     Birth control/protection: Tubal ligation, Surgical     Allergies   Allergen Reactions    Hydrocodone Rash    Hydrocodone-Acetaminophen Rash     PT CAN TAKE ACETAMINOPHEN AND IBUPROFEN    Hydrocodone-Ibuprofen Rash    Oxycodone Rash    Penicillins Rash     Current Outpatient Medications   Medication Sig Dispense Refill    atorvastatin (LIPITOR) 40 MG tablet atorvastatin 40 mg oral tablet take 1 tablet (40 mg)  by oral route once daily   Active      carboxymethylcellulose (REFRESH PLUS) 0.5 % solution 1 drop.      esomeprazole (nexIUM) 40 MG capsule Nexium 40 mg oral capsule,delayed release(DR/EC) take 1 capsule (40 mg) by oral route once daily   Active      fluticasone (Flonase Allergy Relief) 50 MCG/ACT nasal spray 1 spray into the nostril(s) as directed by provider Daily.      HYDROcodone-acetaminophen (NORCO) 5-325 MG per tablet Take 1-2 tablets by mouth Every 4 (Four) Hours As Needed (Pain). (Patient not taking: Reported on 6/5/2024) 30 tablet 0    Ibuprofen 3 %, Gabapentin 10 %, Baclofen 2 %, lidocaine 4 %, Ketamine HCl 4 % Apply 1-2 g topically to the appropriate area as directed 3 (Three) to 4 (Four) times daily. 90 g 5    ketoconazole (NIZORAL) 2 % cream ketoconazole 2 % topical cream apply to the affected area(s) by topical route once daily   Suspended      lisinopril (PRINIVIL,ZESTRIL) 20 MG tablet lisinopril 20 mg oral tablet take 1 tablet (20 mg) by oral route once daily   Active      metFORMIN (GLUCOPHAGE) 1000 MG tablet Take 1 tablet by mouth Daily. INSTRUCTED PT TO TAKE BY 6PM THE NIGHT PRIOR TO SURGERY AND DO NOT TAKE DAY OF SURGERY      ondansetron ODT (ZOFRAN-ODT) 4 MG disintegrating tablet Place 1 tablet on the tongue 4 (Four) Times a Day As Needed for Nausea or Vomiting. (Patient not taking: Reported on 6/5/2024) 20 tablet 0    pramipexole (MIRAPEX) 0.5 MG tablet Take 1 tablet by mouth Daily.      Semaglutide, 1 MG/DOSE, (OZEMPIC) 2 MG/1.5ML solution pen-injector Inject 2 mg under the skin into the appropriate area as directed 1 (One) Time Per Week. INSTRUCTED PT TO STOP 7 DAYS PRIOR TO SURGERY, LAST DOSE 5-2-24       No current facility-administered medications for this visit.     Review of Systems   Constitutional: Negative.    Musculoskeletal:         P/O Left 1st MPJ Arthrodeisis   Neurological:  Positive for numbness.   All other systems reviewed and are negative.      OBJECTIVE     Vitals:     06/26/24 1245   BP: 146/70   Pulse: 82   Temp: 97.8 °F (36.6 °C)   SpO2: 98%         Body mass index is 28.41 kg/m².    Lab Results   Component Value Date    HGBA1C 6.00 (H) 09/27/2023       Lab Results   Component Value Date    GLUCOSE 105 (H) 09/25/2023    CALCIUM 9.7 09/25/2023     09/25/2023    K 5.1 09/25/2023    CO2 23.6 09/25/2023     09/25/2023    BUN 18 09/25/2023    CREATININE 1.07 09/25/2023    BCR 16.8 09/25/2023    ANIONGAP 9.4 09/25/2023       Patient seen in no apparent distress.      PHYSICAL EXAM:     Foot/Ankle Exam    GENERAL  Appearance:  elderly  Orientation:  AAOx3  Affect:  appropriate  Gait:  unimpaired  Assistance:  independent  Right shoe gear: casual shoe    VASCULAR     Right Foot Vascularity   Normal vascular exam    Dorsalis pedis:  2+  Posterior tibial:  2+  Skin temperature:  warm  Edema grading:  None  CFT:  < 3 seconds  Pedal hair growth:  Absent  Varicosities:  none     Left Foot Vascularity   Normal vascular exam    Dorsalis pedis:  2+  Posterior tibial:  2+  Skin temperature:  warm  Edema grading:  None  CFT:  < 3 seconds  Pedal hair growth:  Absent  Varicosities:  none     NEUROLOGIC     Right Foot Neurologic   Light touch sensation: diminished  Vibratory sensation: diminished  Hot/Cold sensation: diminished  Protective Sensation using Abbot-Krishna Monofilament:   Sites intact: 6  Sites tested: 10     Left Foot Neurologic   Light touch sensation: diminished  Vibratory sensation: diminished  Hot/Cold sensation:  diminished  Protective Sensation using Abbot-Krishna Monofilament:   Sites intact: 6  Sites tested: 10    MUSCULOSKELETAL     Left Foot Musculoskeletal   Hallux limitus: Yes      MUSCLE STRENGTH     Right Foot Muscle Strength   Foot dorsiflexion:  4  Foot plantar flexion:  4  Foot inversion:  4  Foot eversion:  4     Left Foot Muscle Strength   Foot dorsiflexion:  4  Foot plantar flexion:  4  Foot inversion:  4  Foot eversion:  4    RANGE OF MOTION      Right Foot Range of Motion   Foot and ankle ROM within normal limits    Ankle dorsiflexion: Right ankle active dorsiflexion: No range of motion of Chopart's and subtalar joints consistent with past surgical history of fusions of these joints.     Left Foot Range of Motion   Foot and ankle ROM within normal limits      DERMATOLOGIC      Right Foot Dermatologic   Skin  Right foot skin is intact.      Left Foot Dermatologic   Skin  Left foot skin is intact.      Left foot additional comments: Left foot shows below-knee fiberglass cast and dressing are dry and intact without signs of breakthrough.  First ray shows skin edges well-coapted with no signs of dehiscence.  No hypertrophic scar formation.  Healthy surgical skin edges.  No drainage present.  No edema, erythema, calor, lymphangitis, nor signs of infection seen.  Hallux in corrected rectus position.    No pressure lesions seen upon removal of cast.    IN-OFFICE IMAGING: Standing, weightbearing, 3 view, AP, MO, Lateral, Left foot Indication: Postoperative Findings: Arthrodesis of first metatarsal phalangeal joint. 2 bone staples are seen in original postoperative position. Hallux in corrected rectus position.  Improvement in trabeculation seen across arthrodesis site of the first metatarsal phalangeal joint. Comparison: No other changes seen compared to previous views.     ASSESSMENT/PLAN     Diagnoses and all orders for this visit:    1. Hallux limitus of left foot (Primary)    2. 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 Diabetic foot    4. Foot pain, left    5. Agent orange exposure    6. Non-insulin dependent type 2 diabetes mellitus    7. Neuropathy      Comprehensive lower extremity examination and evaluation was performed.    Discussed findings and treatment plan including risks, benefits, and treatment options with patient in detail. Patient agreed with treatment plan.    Pt to wear his surgical shoe dispensed for the patient's left foot.  Patient may  begin to ambulate surgical shoe as tolerated.  The patient states understanding and agreement with this plan.    An After Visit Summary was printed and given to the patient at discharge, including (if requested) any available informative/educational handouts regarding diagnosis, treatment, or medications. All questions were answered to patient/family satisfaction. Should symptoms fail to improve or worsen they agree to call or return to clinic or to go to the Emergency Department. Discussed the importance of following up with any needed screening tests/labs/specialist appointments and any requested follow-up recommended by me today. Importance of maintaining follow-up discussed and patient accepts that missed appointments can delay diagnosis and potentially lead to worsening of conditions.    Return in about 3 weeks (around 7/17/2024) for Post Operative, X-ray needed., or sooner if acute issues arise.    This document has been electronically signed by Jeff Gross DPM on June 26, 2024 13:15 EDT

## 2024-08-02 ENCOUNTER — TELEPHONE (OUTPATIENT)
Dept: INTERNAL MEDICINE | Age: 79
End: 2024-08-02
Payer: MEDICARE

## 2024-08-02 DIAGNOSIS — E11.40 TYPE 2 DIABETES MELLITUS WITH DIABETIC NEUROPATHY, WITH LONG-TERM CURRENT USE OF INSULIN: Primary | ICD-10-CM

## 2024-08-02 DIAGNOSIS — Z79.4 TYPE 2 DIABETES MELLITUS WITH DIABETIC NEUROPATHY, WITH LONG-TERM CURRENT USE OF INSULIN: Primary | ICD-10-CM

## 2024-08-02 NOTE — TELEPHONE ENCOUNTER
Call patient tell him we put a referral in for diabetes endocrinology if he would like there is only 1 or 2 that come to [facility] from [place] that we know of otherwise he can go to the nurse practitioner at the [facility] Charline Sung if he would like, you can put the referral in for anybody he would like or endocrinology for diabetes management in general

## 2024-08-19 ENCOUNTER — OFFICE VISIT (OUTPATIENT)
Dept: PODIATRY | Facility: CLINIC | Age: 79
End: 2024-08-19
Payer: MEDICARE

## 2024-08-19 VITALS
HEART RATE: 56 BPM | DIASTOLIC BLOOD PRESSURE: 83 MMHG | OXYGEN SATURATION: 96 % | TEMPERATURE: 96.9 F | WEIGHT: 204 LBS | SYSTOLIC BLOOD PRESSURE: 143 MMHG | BODY MASS INDEX: 29.27 KG/M2

## 2024-08-19 DIAGNOSIS — G62.9 NEUROPATHY: ICD-10-CM

## 2024-08-19 DIAGNOSIS — E11.9 NON-INSULIN DEPENDENT TYPE 2 DIABETES MELLITUS: ICD-10-CM

## 2024-08-19 DIAGNOSIS — E11.8 DIABETIC FOOT: ICD-10-CM

## 2024-08-19 DIAGNOSIS — G62.2 POLYNEUROPATHY DUE TO OTHER TOXIC AGENTS: Primary | ICD-10-CM

## 2024-08-19 DIAGNOSIS — M20.5X2 HALLUX LIMITUS OF LEFT FOOT: ICD-10-CM

## 2024-08-19 DIAGNOSIS — Z77.098 AGENT ORANGE EXPOSURE: ICD-10-CM

## 2024-08-19 PROCEDURE — G8404 LOW EXTEMITY NEUR EXAM DOCUM: HCPCS | Performed by: PODIATRIST

## 2024-08-19 PROCEDURE — 1159F MED LIST DOCD IN RCRD: CPT | Performed by: PODIATRIST

## 2024-08-19 PROCEDURE — 99213 OFFICE O/P EST LOW 20 MIN: CPT | Performed by: PODIATRIST

## 2024-08-19 PROCEDURE — 3079F DIAST BP 80-89 MM HG: CPT | Performed by: PODIATRIST

## 2024-08-19 PROCEDURE — 3077F SYST BP >= 140 MM HG: CPT | Performed by: PODIATRIST

## 2024-08-19 PROCEDURE — 1160F RVW MEDS BY RX/DR IN RCRD: CPT | Performed by: PODIATRIST

## 2024-08-19 NOTE — PROGRESS NOTES
[facility] - PODIATRY    Today's Date: 08/19/24    Patient Name: Sajan Hermosillo  MRN: [id number]  CSN: [id number]  PCP: Juarez Arrington MD, Last PCP Visit:  3/27/2024  Referring Provider: Juarez Arrington,*    SUBJECTIVE     Chief Complaint   Patient presents with    Left Foot - Follow-up, Diabetes    Right Foot - Follow-up, Diabetes     HPI: Sajan Hermosillo, a 79 y.o.male, presents to clinic for a diabetic foot evaluation.    New, Established, New Problem:  est    Onset: Insidious    Nature:  IDDM    Stable, worsening, improving:  stable    Patient controlling diabetes via:  insulin    Patient states there last blood glucose was:  137    Patient denies any fevers, chills, nausea, vomiting, shortness of breath, nor any other constitutional signs nor symptoms.    Neuropathy.    Past Medical History:   Diagnosis Date    Abdominal pain     Ankle pain     Arthritis     Coronary artery disease     Fracture     Heel pain     High cholesterol     HL (hearing loss)     HTN (hypertension)     Hyperlipemia     Ingrowing toenail     Limb swelling     Neuropathy in diabetes 2021    Non-rheumatic aortic stenosis     SEES DR LALA    Plantar fasciitis 2004    Pneumonia June 22 2023    NONE CURRENTLY    Prostate disorder     Seasonal allergies     Sleep apnea     Stroke 2008    NO RESIDUAL    Type 2 diabetes mellitus     BG      Past Surgical History:   Procedure Laterality Date    CAST APPLICATION Left 5/10/2024    Procedure: CAST APPLICATION LEG;  Surgeon: Jeff Gross DPM;  Location: [facility] MAIN OR;  Service: Podiatry;  Laterality: Left;    COLONOSCOPY  2019    Dr. Gomes    COLONOSCOPY N/A 08/22/2022    Procedure: COLONOSCOPY;  Surgeon: Robert Koo MD;  Location: [facility] ENDOSCOPY;  Service: Gastroenterology;  Laterality: N/A;  HEMORRHOIDS, DIVERTICULOSIS    ENDOSCOPY  2019    Dr. Gomes    ENDOSCOPY N/A 08/22/2022    Procedure: ESOPHAGOGASTRODUODENOSCOPY with  biopsy;  Surgeon: Robert Koo MD;  Location: [facility] ENDOSCOPY;  Service: Gastroenterology;  Laterality: N/A;  SMALL HIATAL HERNIA    FOOT FRACTURE SURGERY      FOOT SURGERY Right     JOINT REPLACEMENT      MTP JOINT FUSION Left 5/10/2024    Procedure: LEFT GREAT TOE JOINT FUSION;  Surgeon: Jeff Gross DPM;  Location: [facility] MAIN OR;  Service: Podiatry;  Laterality: Left;    OTHER SURGICAL HISTORY      Artificial joints/limbs    OTHER SURGICAL HISTORY      Metal Implants    REPLACEMENT TOTAL KNEE Left      Family History   Problem Relation Age of Onset    Heart disease Mother     Diabetes Mother         unspecified type    Arthritis Mother     Heart disease Father     Arthritis Father     Heart disease Sister     Diabetes Sister         unspecified type    Heart disease Brother     Arthritis Brother     Diabetes Sister     Colon cancer Neg Hx     Malig Hyperthermia Neg Hx      Social History     Socioeconomic History    Marital status:    Tobacco Use    Smoking status: Former     Current packs/day: 0.00     Average packs/day: 2.0 packs/day for 12.6 years (25.2 ttl pk-yrs)     Types: Cigarettes, Pipe, Cigars     Start date: 1963     Quit date: 1975     Years since quittin.0     Passive exposure: Past    Smokeless tobacco: Former   Vaping Use    Vaping status: Never Used   Substance and Sexual Activity    Alcohol use: Not Currently    Drug use: Never    Sexual activity: Yes     Partners: Female     Birth control/protection: Tubal ligation, Surgical     Allergies   Allergen Reactions    Hydrocodone Rash    Hydrocodone-Acetaminophen Rash     PT CAN TAKE ACETAMINOPHEN AND IBUPROFEN    Hydrocodone-Ibuprofen Rash    Oxycodone Rash    Penicillins Rash     Current Outpatient Medications   Medication Sig Dispense Refill    atorvastatin (LIPITOR) 40 MG tablet atorvastatin 40 mg oral tablet take 1 tablet (40 mg) by oral route once daily   Active      carboxymethylcellulose (REFRESH  PLUS) 0.5 % solution 1 drop.      esomeprazole (nexIUM) 40 MG capsule Nexium 40 mg oral capsule,delayed release(DR/EC) take 1 capsule (40 mg) by oral route once daily   Active      fluticasone (Flonase Allergy Relief) 50 MCG/ACT nasal spray 1 spray into the nostril(s) as directed by provider Daily.      ketoconazole (NIZORAL) 2 % cream ketoconazole 2 % topical cream apply to the affected area(s) by topical route once daily   Suspended      lisinopril (PRINIVIL,ZESTRIL) 20 MG tablet lisinopril 20 mg oral tablet take 1 tablet (20 mg) by oral route once daily   Active      metFORMIN (GLUCOPHAGE) 1000 MG tablet Take 1 tablet by mouth Daily. INSTRUCTED PT TO TAKE BY 6PM THE NIGHT PRIOR TO SURGERY AND DO NOT TAKE DAY OF SURGERY      pramipexole (MIRAPEX) 0.5 MG tablet Take 1 tablet by mouth Daily.      Semaglutide, 1 MG/DOSE, (OZEMPIC) 2 MG/1.5ML solution pen-injector Inject 2 mg under the skin into the appropriate area as directed 1 (One) Time Per Week. INSTRUCTED PT TO STOP 7 DAYS PRIOR TO SURGERY, LAST DOSE 5-2-24      HYDROcodone-acetaminophen (NORCO) 5-325 MG per tablet Take 1-2 tablets by mouth Every 4 (Four) Hours As Needed (Pain). (Patient not taking: Reported on 8/19/2024) 30 tablet 0    ondansetron ODT (ZOFRAN-ODT) 4 MG disintegrating tablet Place 1 tablet on the tongue 4 (Four) Times a Day As Needed for Nausea or Vomiting. (Patient not taking: Reported on 6/5/2024) 20 tablet 0     No current facility-administered medications for this visit.     Review of Systems   Constitutional: Negative.    Neurological:  Positive for numbness.   All other systems reviewed and are negative.      OBJECTIVE     Vitals:    08/19/24 0829   BP: 143/83   Pulse: 56   Temp: 96.9 °F (36.1 °C)   SpO2: 96%       Body mass index is 29.27 kg/m².    Lab Results   Component Value Date    HGBA1C 6.00 (H) 09/27/2023       Lab Results   Component Value Date    GLUCOSE 105 (H) 09/25/2023    CALCIUM 9.7 09/25/2023     09/25/2023    K 5.1  09/25/2023    CO2 23.6 09/25/2023     09/25/2023    BUN 18 09/25/2023    CREATININE 1.07 09/25/2023    BCR 16.8 09/25/2023    ANIONGAP 9.4 09/25/2023       Patient seen in no apparent distress.      PHYSICAL EXAM:     Foot/Ankle Exam    GENERAL  Diabetic foot exam performed    Appearance:  appears stated age, elderly and healthy  Orientation:  AAOx3  Affect:  appropriate  Gait:  unimpaired  Assistance:  independent  Right shoe gear: casual shoe  Left shoe gear: casual shoe    VASCULAR     Right Foot Vascularity   Dorsalis pedis:  2+  Posterior tibial:  2+  Skin temperature:  warm  Edema grading:  None  CFT:  < 3 seconds  Pedal hair growth:  Absent  Varicosities:  none     Left Foot Vascularity   Dorsalis pedis:  2+  Posterior tibial:  2+  Skin temperature:  warm  Edema grading:  None  CFT:  < 3 seconds  Pedal hair growth:  Absent  Varicosities:  none     NEUROLOGIC     Right Foot Neurologic   Light touch sensation: diminished  Vibratory sensation: diminished  Hot/Cold sensation: diminished  Protective Sensation using Shushan-Krishna Monofilament:   Sites intact: 6  Sites tested: 10     Left Foot Neurologic   Light touch sensation: diminished  Vibratory sensation: diminished  Hot/Cold sensation:  diminished  Protective Sensation using Shushan-Krishna Monofilament:   Sites intact: 6  Sites tested: 10    MUSCLE STRENGTH     Right Foot Muscle Strength   Foot dorsiflexion:  4  Foot plantar flexion:  4  Foot inversion:  4  Foot eversion:  4     Left Foot Muscle Strength   Foot dorsiflexion:  4  Foot plantar flexion:  4  Foot inversion:  4  Foot eversion:  4    RANGE OF MOTION     Right Foot Range of Motion   Foot and ankle ROM within normal limits    Foot eversion:  decreased  with pain  Foot inversion:  decreased  with pain     Left Foot Range of Motion   Foot and ankle ROM within normal limits      DERMATOLOGIC      Right Foot Dermatologic   Skin  Right foot skin is intact.      Left Foot Dermatologic    Skin  Left foot skin is intact.      Right foot additional comments: No range of motion of Chopart's and subtalar joints consistent with past surgical history of fusions of these joints.    Diabetic Foot Exam Performed and Monofilament Test Performed    ASSESSMENT/PLAN     Diagnoses and all orders for this visit:    1. 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 Agent orange exposure    3. Non-insulin dependent type 2 diabetes mellitus    4. Hallux limitus of left foot    5. Diabetic foot    6. Neuropathy    Rx:  DM Shoes    Comprehensive lower extremity examination and evaluation was performed.    Discussed findings and treatment plan including risks, benefits, and treatment options with patient in detail. Patient agreed with treatment plan.    Medications and allergies reviewed.  Reviewed available blood glucose and HgB A1C lab values along with other pertinent labs.  These were discussed with the patient as to their importance of diabetic maintenance.    Diabetic foot exam performed and documented this date, compliant with CQM required standards. Detail of findings as noted in physical exam.  Lower extremity Neurologic exam for diabetic patient performed and documented this date, compliant with PQRS required standards. Detail of findings as noted in physical exam.  Advised patient importance of good routine lower extremity hygiene. Advised patient importance of evaluating for intact skin and pain free nail borders.  Advised patient to use mirror to evaluate plantar/ soles of feet for better visualization. Advised patient monitor and phone office to be seen if any cracking to skin, open lesions, painful nail borders or if nails become elongated prior to next visit. Advised patient importance of daily cleansing of lower extremities, followed by good skin cream to maintain normal hydration of skin. Also advised patient importance of close daily monitoring of blood sugar. Advised to regulate diet and  medications to maintain control of blood sugar in optimal range. Contact primary care provider if difficulties maintaining blood sugar levels.  Advised Patient of presence of Diabetes Mellitus condition.  Advised Patient risk of progression and worsening or improvement, then return of condition.  Will monitor condition for any change in future. Treat with most appropriate treatment pending status of condition.  Counseled and advised patient extensively on nature and ramifications of diabetes. Standard instructions given to patient for good diabetic foot care and maintenance. Advised importance of careful monitoring to avoid break down and complications secondary to diabetes. Advised patient importance of strict maintenance of blood sugar control. Advised patient of possible ominous results from neglect of condition, i.e.: amputation/ loss of digits, feet and legs, or even death.  Patient states understands counseling, will monitor closely, continue good hygiene and routine diabetic foot care. Patient will contact office is questions or problems.      An After Visit Summary was printed and given to the patient at discharge, including (if requested) any available informative/educational handouts regarding diagnosis, treatment, or medications. All questions were answered to patient/family satisfaction. Should symptoms fail to improve or worsen they agree to call or return to clinic or to go to the Emergency Department. Discussed the importance of following up with any needed screening tests/labs/specialist appointments and any requested follow-up recommended by me today. Importance of maintaining follow-up discussed and patient accepts that missed appointments can delay diagnosis and potentially lead to worsening of conditions.    Return in about 1 year (around 8/19/2025) for DFE., or sooner if acute issues arise.    This document has been electronically signed by Jeff Gross DPM on August 19, 2024 09:03 EDT

## 2024-09-02 NOTE — PROGRESS NOTES
Chief Complaint  Diabetes (New Patient, Est care, CGM eval)    Referred By: Juarez Arrington, *    Subjective          Sajan Hermosillo presents to [facility] for diabetes medication management    History of Present Illness    Visit type:  to establish care  Diabetes type:  Type 2  Age at time of dx/Year of dx/Number of years: Reports he was diagnosed with type 2 diabetes about 10 years ago  Family History of Diabetes: Mother, and 2 sisters  Current diabetes status/concerns/issues: Reports he was referred to our clinic by his primary care provider for management of his diabetes.  Reports he is currently taking Semglee 50 units nightly, metformin at 1000 mg daily and Ozempic 2 mg once weekly.  He reports he has a randy 3 CGM for monitoring of his glucose levels. Reports his fbs is running in the 90-116mg/dl. States the Ozempic helps curb his appetite. States he lost 40lbs since starting Ozempic.   Other current health concerns: reports [facility] Norvasc 5mg qd for HTN last wk.   Current Diabetes symptoms:    Polyuria: No   Polydipsia: Yes     Polyphagia: No   Blurred vision: No   Excessive fatigue: Yes    Known Diabetes complications:  Neuropathy: Numbness, Tingling, and Other: compression ; Location: Feet  Renal: Stage II mild (GFR = 60-89 mL/min)  Eyes: No current eye exam available in record; Location: N/A; Last Eye Exam:  1/24 ; Location: [facility]  Amputation/Wounds: None  GI: Constipation, Diarrhea, Reflux, and Indigestion  Cardiovascular: Hypertension, Hyperlipidemia, and CVA (History of)  ED: Patient Reported  Other: None  Hospitalizations/ED/911 secondary to DM?  No  Hypoglycemia:  None reported at this time and 0% per CGM  Hypoglycemia Symptoms:  No hypoglycemia at this time  Current Diabetes treatment:  Ozempic 2mg once wk, metformin 1000mg qd, Semglee 50 units q hs  Prior diabetes treatments:  Lantus  Using ACEI or ARB: Yes, Lisinopril 20mg qd,  "Managed by other provider  Using Statin: Yes, Lipitor 40mg qd, Managed by other provider  Blood glucose device:  Barbara CGM  Blood glucose monitoring frequency:  Continuous per CGM  Blood glucose range/average:  The 14-day sensor report shows an average glucose of 138mg/dL, with 82% in target range ( mgdL), 17% in the high range (181-250 mg/dL), 1% in the very high range (>250 mg/dL), 0% in the low range (54-70 mg/dL) and 0% in the very low range (<54 mg/dL).   Glucose Source: Device Reviewed  Dietary behavior:  Avoids sugary drinks, \"Eat what I want\"/No diet plan, Number of meals each day - 3; Number of snacks each day - 1  Activity/Exercise:   active around the house  Last Foot Exam:   2 wks ago  Diabetes Education Hx: Diabetes Nutrition Counseling  Social Determinants of Health: None    Past Medical History:   Diagnosis Date    Abdominal pain     Ankle pain     Arthritis     Coronary artery disease     Fracture     Heel pain     High cholesterol     HL (hearing loss)     HTN (hypertension)     Hyperlipemia     Ingrowing toenail     Limb swelling     Neuropathy in diabetes 2021    Non-rheumatic aortic stenosis     SEES DR LALA    Plantar fasciitis 2004    Pneumonia June 22 2023    NONE CURRENTLY    Prostate disorder     Seasonal allergies     Sleep apnea     Stroke 2008    NO RESIDUAL    Type 2 diabetes mellitus     BG      Past Surgical History:   Procedure Laterality Date    CAST APPLICATION Left 5/10/2024    Procedure: CAST APPLICATION LEG;  Surgeon: Jeff Gross DPM;  Location: [facility] MAIN OR;  Service: Podiatry;  Laterality: Left;    COLONOSCOPY  2019    Dr. Gomes    COLONOSCOPY N/A 08/22/2022    Procedure: COLONOSCOPY;  Surgeon: Robert Koo MD;  Location: [facility] ENDOSCOPY;  Service: Gastroenterology;  Laterality: N/A;  HEMORRHOIDS, DIVERTICULOSIS    ENDOSCOPY  2019    Dr. Gomes    ENDOSCOPY N/A 08/22/2022    Procedure: ESOPHAGOGASTRODUODENOSCOPY with " biopsy;  Surgeon: Robert Koo MD;  Location: [facility] ENDOSCOPY;  Service: Gastroenterology;  Laterality: N/A;  SMALL HIATAL HERNIA    FOOT FRACTURE SURGERY      FOOT SURGERY Right     JOINT REPLACEMENT      MTP JOINT FUSION Left 5/10/2024    Procedure: LEFT GREAT TOE JOINT FUSION;  Surgeon: Jeff Gross DPM;  Location: [facility] MAIN OR;  Service: Podiatry;  Laterality: Left;    OTHER SURGICAL HISTORY      Artificial joints/limbs    OTHER SURGICAL HISTORY      Metal Implants    REPLACEMENT TOTAL KNEE Left      Family History   Problem Relation Age of Onset    Heart disease Mother     Diabetes Mother         unspecified type    Arthritis Mother     Heart disease Father     Arthritis Father     Heart disease Sister     Diabetes Sister         unspecified type    Heart disease Brother     Arthritis Brother     Diabetes Sister     Colon cancer Neg Hx     Malig Hyperthermia Neg Hx      Social History     Socioeconomic History    Marital status:    Tobacco Use    Smoking status: Former     Current packs/day: 0.00     Average packs/day: 2.0 packs/day for 12.6 years (25.2 ttl pk-yrs)     Types: Cigarettes, Pipe, Cigars     Start date: 1963     Quit date: 1975     Years since quittin.1     Passive exposure: Past    Smokeless tobacco: Former   Vaping Use    Vaping status: Never Used   Substance and Sexual Activity    Alcohol use: Not Currently    Drug use: Never    Sexual activity: Yes     Partners: Female     Birth control/protection: Tubal ligation, Surgical     Allergies   Allergen Reactions    Hydrocodone Rash    Hydrocodone-Acetaminophen Rash     PT CAN TAKE ACETAMINOPHEN AND IBUPROFEN    Hydrocodone-Ibuprofen Rash    Oxycodone Rash    Penicillins Rash       Current Outpatient Medications:     amLODIPine (NORVASC) 10 MG tablet, Take 0.5 tablets by mouth Daily., Disp: , Rfl:     aspirin 81 MG EC tablet, Take 1 tablet by mouth Daily., Disp: , Rfl:     atorvastatin (LIPITOR) 40 MG  "tablet, atorvastatin 40 mg oral tablet take 1 tablet (40 mg) by oral route once daily   Active, Disp: , Rfl:     carboxymethylcellulose (REFRESH PLUS) 0.5 % solution, 1 drop., Disp: , Rfl:     esomeprazole (nexIUM) 40 MG capsule, Nexium 40 mg oral capsule,delayed release(DR/EC) take 1 capsule (40 mg) by oral route once daily   Active, Disp: , Rfl:     fluticasone (Flonase Allergy Relief) 50 MCG/ACT nasal spray, 1 spray into the nostril(s) as directed by provider Daily., Disp: , Rfl:     ketoconazole (NIZORAL) 2 % cream, ketoconazole 2 % topical cream apply to the affected area(s) by topical route once daily   Suspended, Disp: , Rfl:     lisinopril (PRINIVIL,ZESTRIL) 20 MG tablet, lisinopril 20 mg oral tablet take 1 tablet (20 mg) by oral route once daily   Active, Disp: , Rfl:     metFORMIN (GLUCOPHAGE) 1000 MG tablet, Take 1 tablet by mouth Daily. INSTRUCTED PT TO TAKE BY 6PM THE NIGHT PRIOR TO SURGERY AND DO NOT TAKE DAY OF SURGERY, Disp: , Rfl:     pramipexole (MIRAPEX) 0.5 MG tablet, Take 1 tablet by mouth Daily., Disp: , Rfl:     Semaglutide, 1 MG/DOSE, (OZEMPIC) 2 MG/1.5ML solution pen-injector, Inject 2 mg under the skin into the appropriate area as directed 1 (One) Time Per Week. INSTRUCTED PT TO STOP 7 DAYS PRIOR TO SURGERY, LAST DOSE 5-2-24, Disp: , Rfl:     Cholecalciferol 25 MCG (1000 UT) tablet, Take 2 tablets by mouth Daily., Disp: , Rfl:     ibuprofen (ADVIL,MOTRIN) 600 MG tablet, Take 1 tablet by mouth Every 6 (Six) Hours As Needed for Mild Pain., Disp: , Rfl:     Insulin Glargine-yfgn (SEMGLEE-YFGN) 100 UNIT/ML, Inject 50 Units under the skin into the appropriate area as directed Every Night., Disp: , Rfl:     vitamin B-12 (CYANOCOBALAMIN) 500 MCG tablet, Take 1 tablet by mouth Daily., Disp: , Rfl:     Objective     Vitals:    09/03/24 0930   BP: 128/61   Pulse: 60   SpO2: 97%   Weight: 92.4 kg (203 lb 9.6 oz)   Height: 177.8 cm (70\")   PainSc: 0-No pain     Body mass index is 29.21 " kg/m².    Physical Exam  Constitutional:       Appearance: Normal appearance. He is normal weight.      Comments: Overweight (BMI 25 - 29.9) Pt Current BMI = 29.21     HENT:      Head: Normocephalic and atraumatic.      Right Ear: External ear normal.      Left Ear: External ear normal.      Nose: Nose normal.   Eyes:      Extraocular Movements: Extraocular movements intact.      Conjunctiva/sclera: Conjunctivae normal.   Pulmonary:      Effort: Pulmonary effort is normal.   Musculoskeletal:         General: Normal range of motion.      Cervical back: Normal range of motion.   Skin:     General: Skin is warm and dry.   Neurological:      General: No focal deficit present.      Mental Status: He is alert and oriented to person, place, and time. Mental status is at baseline.   Psychiatric:         Mood and Affect: Mood normal.         Behavior: Behavior normal.         Thought Content: Thought content normal.         Judgment: Judgment normal.             Result Review :   The following data was reviewed by: WESLEY Braga on 09/03/2024:    Most Recent A1C          9/27/2023    12:58   HGBA1C Most Recent   Hemoglobin A1C 6.00        A1C Last 3 Results          9/27/2023    12:58   HGBA1C Last 3 Results   Hemoglobin A1C 6.00      A1c collected on 8/27/24  is 6.8%, indicating Controlled Type II diabetes.  This result is unchanged from the prior result of 6.8% collected on 3/31/23     Glucose   Date Value Ref Range Status   05/10/2024 127 (H) 70 - 99 mg/dL Final     Comment:     Serial Number: [id number]:  [id number]     Point of care glucose in the office today is within normal limits for nonfasting glucose    Creatinine   Date Value Ref Range Status   09/25/2023 1.07 0.76 - 1.27 mg/dL Final   07/03/2023 1.17 0.76 - 1.27 mg/dL Final     eGFR   Date Value Ref Range Status   09/25/2023 71.9 >60.0 mL/min/1.73 Final   07/03/2023 64.6 >60.0 mL/min/1.73 Final     Labs collected on 9/25/23 show Stage II mild  (GFR = 60-89mL/min)                Assessment: Patient was referred to our clinic by his primary care provider for management of his diabetes.  Reports he is currently taking Semglee 50 units nightly, metformin at 1000 mg daily and Ozempic 2 mg once weekly.  He reports he has a randy 3 CGM for monitoring of his glucose levels. Reports his fbs is running in the 90-116mg/dl. States the Ozempic helps curb his appetite. States he lost 40lbs since starting Ozempic.  Reports he has seen a dietitian in the past.  States he does not count carbs or moderate carbs/sugars however he does eat in moderation.  States he does avoid sugary drinks.  Reviewed CGM report with patient the office today.The 14-day sensor report shows an average glucose of 138mg/dL, with 82% in target range ( mgdL), 17% in the high range (181-250 mg/dL), 1% in the very high range (>250 mg/dL), 0% in the low range (54-70 mg/dL) and 0% in the very low range (<54 mg/dL).  His A1c on 8/27/2024 was 6.8% indicating controlled type 2 diabetes.      Diagnoses and all orders for this visit:    1. Controlled type 2 diabetes mellitus with hyperglycemia, with long-term current use of insulin (Primary)  -     Microalbumin / Creatinine Urine Ratio - Urine, Clean Catch; Future    2. Type 2 diabetes mellitus with diabetic neuropathy, with long-term current use of insulin    3. Type 2 diabetes mellitus with stage 2 chronic kidney disease, with long-term current use of insulin    4. Overweight (BMI 25.0-29.9)        Plan: He is due a urine micro which was ordered at his visit today.  No changes were made to his medication regimen since his diabetes is well-controlled on his current regimen.  Scheduled a 3-month follow-up and we will review his CGM and recheck his A1c at that time.    The patient will monitor his blood glucose levels per CGM.  If he develops problematic hyperglycemia or hypoglycemia or adverse drug reactions, he will contact the office for further  instructions.        Follow Up     Return in about 3 months (around 12/3/2024) for Medication Mgmt, CGM Follow Up.    Patient was given instructions and counseling regarding his condition or for health maintenance advice. Please see specific information pulled into the AVS if appropriate.     Kirstie Sung, WESLEY  09/03/2024      Dictated Utilizing Dragon Dictation.  Please note that portions of this note were completed with a voice recognition program.  Part of this note may be an electronic transcription/translation of spoken language to printed text using the Dragon Dictation System.

## 2024-09-03 ENCOUNTER — OFFICE VISIT (OUTPATIENT)
Dept: DIABETES SERVICES | Facility: HOSPITAL | Age: 79
End: 2024-09-03
Payer: MEDICARE

## 2024-09-03 VITALS
HEIGHT: 70 IN | OXYGEN SATURATION: 97 % | SYSTOLIC BLOOD PRESSURE: 128 MMHG | BODY MASS INDEX: 29.15 KG/M2 | DIASTOLIC BLOOD PRESSURE: 61 MMHG | HEART RATE: 60 BPM | WEIGHT: 203.6 LBS

## 2024-09-03 DIAGNOSIS — E11.40 TYPE 2 DIABETES MELLITUS WITH DIABETIC NEUROPATHY, WITH LONG-TERM CURRENT USE OF INSULIN: ICD-10-CM

## 2024-09-03 DIAGNOSIS — Z79.4 CONTROLLED TYPE 2 DIABETES MELLITUS WITH HYPERGLYCEMIA, WITH LONG-TERM CURRENT USE OF INSULIN: Primary | ICD-10-CM

## 2024-09-03 DIAGNOSIS — N18.2 TYPE 2 DIABETES MELLITUS WITH STAGE 2 CHRONIC KIDNEY DISEASE, WITH LONG-TERM CURRENT USE OF INSULIN: ICD-10-CM

## 2024-09-03 DIAGNOSIS — E11.22 TYPE 2 DIABETES MELLITUS WITH STAGE 2 CHRONIC KIDNEY DISEASE, WITH LONG-TERM CURRENT USE OF INSULIN: ICD-10-CM

## 2024-09-03 DIAGNOSIS — E66.3 OVERWEIGHT (BMI 25.0-29.9): ICD-10-CM

## 2024-09-03 DIAGNOSIS — Z79.4 TYPE 2 DIABETES MELLITUS WITH DIABETIC NEUROPATHY, WITH LONG-TERM CURRENT USE OF INSULIN: ICD-10-CM

## 2024-09-03 DIAGNOSIS — E11.65 CONTROLLED TYPE 2 DIABETES MELLITUS WITH HYPERGLYCEMIA, WITH LONG-TERM CURRENT USE OF INSULIN: Primary | ICD-10-CM

## 2024-09-03 DIAGNOSIS — Z79.4 TYPE 2 DIABETES MELLITUS WITH STAGE 2 CHRONIC KIDNEY DISEASE, WITH LONG-TERM CURRENT USE OF INSULIN: ICD-10-CM

## 2024-09-03 PROBLEM — M79.673 HEEL PAIN: Status: ACTIVE | Noted: 2024-09-03

## 2024-09-03 PROBLEM — E13.9 DIABETES 1.5, MANAGED AS TYPE 2: Status: RESOLVED | Noted: 2021-09-29 | Resolved: 2024-09-03

## 2024-09-03 PROBLEM — N42.9 PROSTATE DISORDER: Status: ACTIVE | Noted: 2024-09-03

## 2024-09-03 PROBLEM — R50.9 FEBRILE ILLNESS, ACUTE: Status: RESOLVED | Noted: 2023-06-28 | Resolved: 2024-09-03

## 2024-09-03 PROBLEM — J18.9 PNEUMONIA OF RIGHT LOWER LOBE DUE TO INFECTIOUS ORGANISM: Status: RESOLVED | Noted: 2023-06-28 | Resolved: 2024-09-03

## 2024-09-03 PROBLEM — N41.0 ACUTE PROSTATITIS: Status: RESOLVED | Noted: 2023-06-28 | Resolved: 2024-09-03

## 2024-09-03 PROBLEM — M19.90 ARTHRITIS: Status: ACTIVE | Noted: 2024-09-03

## 2024-09-03 PROBLEM — M25.579 ANKLE PAIN: Status: ACTIVE | Noted: 2024-09-03

## 2024-09-03 PROBLEM — M79.89 LIMB SWELLING: Status: ACTIVE | Noted: 2024-09-03

## 2024-09-03 LAB
ALBUMIN UR-MCNC: <1.2 MG/DL
CREAT UR-MCNC: 69.6 MG/DL
MICROALBUMIN/CREAT UR: NORMAL MG/G{CREAT}

## 2024-09-03 PROCEDURE — 99214 OFFICE O/P EST MOD 30 MIN: CPT | Performed by: NURSE PRACTITIONER

## 2024-09-03 PROCEDURE — 1160F RVW MEDS BY RX/DR IN RCRD: CPT | Performed by: NURSE PRACTITIONER

## 2024-09-03 PROCEDURE — G0463 HOSPITAL OUTPT CLINIC VISIT: HCPCS | Performed by: NURSE PRACTITIONER

## 2024-09-03 PROCEDURE — 82570 ASSAY OF URINE CREATININE: CPT | Performed by: NURSE PRACTITIONER

## 2024-09-03 PROCEDURE — 3074F SYST BP LT 130 MM HG: CPT | Performed by: NURSE PRACTITIONER

## 2024-09-03 PROCEDURE — 1159F MED LIST DOCD IN RCRD: CPT | Performed by: NURSE PRACTITIONER

## 2024-09-03 PROCEDURE — 3078F DIAST BP <80 MM HG: CPT | Performed by: NURSE PRACTITIONER

## 2024-09-03 PROCEDURE — 82043 UR ALBUMIN QUANTITATIVE: CPT | Performed by: NURSE PRACTITIONER

## 2024-09-03 RX ORDER — ASPIRIN 81 MG/1
81 TABLET ORAL DAILY
COMMUNITY

## 2024-09-03 RX ORDER — CHOLECALCIFEROL (VITAMIN D3) 25 MCG
2000 TABLET ORAL DAILY
COMMUNITY

## 2024-09-03 RX ORDER — SODIUM PHOSPHATE,MONO-DIBASIC 19G-7G/118
3 ENEMA (ML) RECTAL DAILY
COMMUNITY
End: 2024-09-03

## 2024-09-03 RX ORDER — IBUPROFEN 600 MG/1
600 TABLET, FILM COATED ORAL EVERY 6 HOURS PRN
COMMUNITY

## 2024-09-03 RX ORDER — UREA 10 %
500 LOTION (ML) TOPICAL DAILY
COMMUNITY

## 2024-09-03 RX ORDER — INSULIN GLARGINE-YFGN 100 [IU]/ML
50 INJECTION, SOLUTION SUBCUTANEOUS NIGHTLY
COMMUNITY

## 2024-09-03 RX ORDER — AMLODIPINE BESYLATE 10 MG/1
5 TABLET ORAL DAILY
COMMUNITY

## 2024-09-23 ENCOUNTER — LAB (OUTPATIENT)
Dept: LAB | Facility: HOSPITAL | Age: 79
End: 2024-09-23
Payer: MEDICARE

## 2024-09-23 DIAGNOSIS — J30.1 ALLERGIC RHINITIS DUE TO POLLEN, UNSPECIFIED SEASONALITY: ICD-10-CM

## 2024-09-23 DIAGNOSIS — E55.9 VITAMIN D DEFICIENCY: ICD-10-CM

## 2024-09-23 DIAGNOSIS — R01.1 HEART MURMUR: ICD-10-CM

## 2024-09-23 DIAGNOSIS — I10 HYPERTENSION, ESSENTIAL: ICD-10-CM

## 2024-09-23 DIAGNOSIS — Z79.4 TYPE 2 DIABETES MELLITUS WITH DIABETIC NEUROPATHY, WITH LONG-TERM CURRENT USE OF INSULIN: ICD-10-CM

## 2024-09-23 DIAGNOSIS — G25.81 RLS (RESTLESS LEGS SYNDROME): ICD-10-CM

## 2024-09-23 DIAGNOSIS — E78.2 MIXED HYPERLIPIDEMIA: ICD-10-CM

## 2024-09-23 DIAGNOSIS — Z00.00 MEDICARE ANNUAL WELLNESS VISIT, SUBSEQUENT: ICD-10-CM

## 2024-09-23 DIAGNOSIS — Z12.5 SCREENING PSA (PROSTATE SPECIFIC ANTIGEN): ICD-10-CM

## 2024-09-23 DIAGNOSIS — E11.40 TYPE 2 DIABETES MELLITUS WITH DIABETIC NEUROPATHY, WITH LONG-TERM CURRENT USE OF INSULIN: ICD-10-CM

## 2024-09-23 DIAGNOSIS — K21.9 GASTROESOPHAGEAL REFLUX DISEASE WITHOUT ESOPHAGITIS: ICD-10-CM

## 2024-09-23 DIAGNOSIS — E87.1 HYPONATREMIA: ICD-10-CM

## 2024-09-23 LAB
ALBUMIN SERPL-MCNC: 3.8 G/DL (ref 3.5–5.2)
ALBUMIN/GLOB SERPL: 1 G/DL
ALP SERPL-CCNC: 118 U/L (ref 39–117)
ALT SERPL W P-5'-P-CCNC: 21 U/L (ref 1–41)
ANION GAP SERPL CALCULATED.3IONS-SCNC: 7.7 MMOL/L (ref 5–15)
AST SERPL-CCNC: 19 U/L (ref 1–40)
BASOPHILS # BLD AUTO: 0.05 10*3/MM3 (ref 0–0.2)
BASOPHILS NFR BLD AUTO: 0.7 % (ref 0–1.5)
BILIRUB SERPL-MCNC: 0.3 MG/DL (ref 0–1.2)
BUN SERPL-MCNC: 15 MG/DL (ref 8–23)
BUN/CREAT SERPL: 12.8 (ref 7–25)
CALCIUM SPEC-SCNC: 10.2 MG/DL (ref 8.6–10.5)
CHLORIDE SERPL-SCNC: 102 MMOL/L (ref 98–107)
CHOLEST SERPL-MCNC: 100 MG/DL (ref 0–200)
CO2 SERPL-SCNC: 26.3 MMOL/L (ref 22–29)
CREAT SERPL-MCNC: 1.17 MG/DL (ref 0.76–1.27)
DEPRECATED RDW RBC AUTO: 42.8 FL (ref 37–54)
EGFRCR SERPLBLD CKD-EPI 2021: 64.2 ML/MIN/1.73
EOSINOPHIL # BLD AUTO: 0.16 10*3/MM3 (ref 0–0.4)
EOSINOPHIL NFR BLD AUTO: 2.3 % (ref 0.3–6.2)
ERYTHROCYTE [DISTWIDTH] IN BLOOD BY AUTOMATED COUNT: 12.9 % (ref 12.3–15.4)
GLOBULIN UR ELPH-MCNC: 4 GM/DL
GLUCOSE SERPL-MCNC: 106 MG/DL (ref 65–99)
HBA1C MFR BLD: 6.9 % (ref 4.8–5.6)
HCT VFR BLD AUTO: 41.8 % (ref 37.5–51)
HDLC SERPL-MCNC: 38 MG/DL (ref 40–60)
HGB BLD-MCNC: 14 G/DL (ref 13–17.7)
IMM GRANULOCYTES # BLD AUTO: 0.02 10*3/MM3 (ref 0–0.05)
IMM GRANULOCYTES NFR BLD AUTO: 0.3 % (ref 0–0.5)
LDLC SERPL CALC-MCNC: 41 MG/DL (ref 0–100)
LDLC/HDLC SERPL: 1.03 {RATIO}
LYMPHOCYTES # BLD AUTO: 2.61 10*3/MM3 (ref 0.7–3.1)
LYMPHOCYTES NFR BLD AUTO: 37.2 % (ref 19.6–45.3)
MCH RBC QN AUTO: 30.5 PG (ref 26.6–33)
MCHC RBC AUTO-ENTMCNC: 33.5 G/DL (ref 31.5–35.7)
MCV RBC AUTO: 91.1 FL (ref 79–97)
MONOCYTES # BLD AUTO: 0.56 10*3/MM3 (ref 0.1–0.9)
MONOCYTES NFR BLD AUTO: 8 % (ref 5–12)
NEUTROPHILS NFR BLD AUTO: 3.61 10*3/MM3 (ref 1.7–7)
NEUTROPHILS NFR BLD AUTO: 51.5 % (ref 42.7–76)
NRBC BLD AUTO-RTO: 0 /100 WBC (ref 0–0.2)
PLATELET # BLD AUTO: 243 10*3/MM3 (ref 140–450)
PMV BLD AUTO: 10 FL (ref 6–12)
POTASSIUM SERPL-SCNC: 5.5 MMOL/L (ref 3.5–5.2)
PROT SERPL-MCNC: 7.8 G/DL (ref 6–8.5)
PSA SERPL-MCNC: 2.15 NG/ML (ref 0–4)
RBC # BLD AUTO: 4.59 10*6/MM3 (ref 4.14–5.8)
SODIUM SERPL-SCNC: 136 MMOL/L (ref 136–145)
TRIGL SERPL-MCNC: 114 MG/DL (ref 0–150)
VLDLC SERPL-MCNC: 21 MG/DL (ref 5–40)
WBC NRBC COR # BLD AUTO: 7.01 10*3/MM3 (ref 3.4–10.8)

## 2024-09-23 PROCEDURE — 80053 COMPREHEN METABOLIC PANEL: CPT

## 2024-09-23 PROCEDURE — 83036 HEMOGLOBIN GLYCOSYLATED A1C: CPT

## 2024-09-23 PROCEDURE — G0103 PSA SCREENING: HCPCS

## 2024-09-23 PROCEDURE — 80061 LIPID PANEL: CPT

## 2024-09-23 PROCEDURE — 36415 COLL VENOUS BLD VENIPUNCTURE: CPT

## 2024-09-23 PROCEDURE — 85025 COMPLETE CBC W/AUTO DIFF WBC: CPT

## 2024-09-30 ENCOUNTER — OFFICE VISIT (OUTPATIENT)
Dept: INTERNAL MEDICINE | Age: 79
End: 2024-09-30
Payer: MEDICARE

## 2024-09-30 VITALS
OXYGEN SATURATION: 93 % | DIASTOLIC BLOOD PRESSURE: 68 MMHG | TEMPERATURE: 98.2 F | BODY MASS INDEX: 29.2 KG/M2 | WEIGHT: 204 LBS | HEIGHT: 70 IN | HEART RATE: 77 BPM | SYSTOLIC BLOOD PRESSURE: 144 MMHG

## 2024-09-30 DIAGNOSIS — I10 HYPERTENSION, ESSENTIAL: ICD-10-CM

## 2024-09-30 DIAGNOSIS — E78.2 MIXED HYPERLIPIDEMIA: ICD-10-CM

## 2024-09-30 DIAGNOSIS — R01.1 HEART MURMUR: ICD-10-CM

## 2024-09-30 DIAGNOSIS — G25.81 RLS (RESTLESS LEGS SYNDROME): ICD-10-CM

## 2024-09-30 DIAGNOSIS — Z12.5 SCREENING PSA (PROSTATE SPECIFIC ANTIGEN): ICD-10-CM

## 2024-09-30 DIAGNOSIS — Z79.4 TYPE 2 DIABETES MELLITUS WITH DIABETIC NEUROPATHY, WITH LONG-TERM CURRENT USE OF INSULIN: Primary | ICD-10-CM

## 2024-09-30 DIAGNOSIS — K21.9 GASTROESOPHAGEAL REFLUX DISEASE WITHOUT ESOPHAGITIS: ICD-10-CM

## 2024-09-30 DIAGNOSIS — E55.9 VITAMIN D DEFICIENCY: ICD-10-CM

## 2024-09-30 DIAGNOSIS — E11.40 TYPE 2 DIABETES MELLITUS WITH DIABETIC NEUROPATHY, WITH LONG-TERM CURRENT USE OF INSULIN: Primary | ICD-10-CM

## 2024-09-30 DIAGNOSIS — E87.5 HYPERKALEMIA: ICD-10-CM

## 2024-09-30 PROCEDURE — 99214 OFFICE O/P EST MOD 30 MIN: CPT | Performed by: INTERNAL MEDICINE

## 2024-09-30 PROCEDURE — 1126F AMNT PAIN NOTED NONE PRSNT: CPT | Performed by: INTERNAL MEDICINE

## 2024-09-30 PROCEDURE — 3077F SYST BP >= 140 MM HG: CPT | Performed by: INTERNAL MEDICINE

## 2024-09-30 PROCEDURE — 3078F DIAST BP <80 MM HG: CPT | Performed by: INTERNAL MEDICINE

## 2024-09-30 PROCEDURE — G2211 COMPLEX E/M VISIT ADD ON: HCPCS | Performed by: INTERNAL MEDICINE

## 2024-09-30 NOTE — PROGRESS NOTES
"CHIEF COMPLAINT/ HPI:  Hyperlipidemia (Routine follow up. Lab follow up. Pt has concern about potassium being high.)              Objective   Vital Signs  Vitals:    09/30/24 1211   BP: 144/68   Pulse: 77   Temp: 98.2 °F (36.8 °C)   SpO2: 93%   Weight: 92.5 kg (204 lb)   Height: 177.8 cm (70\")      Body mass index is 29.27 kg/m².  Review of Systems   Constitutional: Negative.    HENT: Negative.     Eyes: Negative.    Respiratory: Negative.     Cardiovascular: Negative.    Gastrointestinal: Negative.    Endocrine: Negative.    Genitourinary: Negative.    Musculoskeletal: Negative.    Allergic/Immunologic: Negative.    Neurological: Negative.    Hematological: Negative.    Psychiatric/Behavioral: Negative.        Physical Exam  Constitutional:       General: He is not in acute distress.     Appearance: Normal appearance.   HENT:      Head: Normocephalic.      Mouth/Throat:      Mouth: Mucous membranes are moist.   Eyes:      Conjunctiva/sclera: Conjunctivae normal.      Pupils: Pupils are equal, round, and reactive to light.   Cardiovascular:      Rate and Rhythm: Normal rate and regular rhythm.      Pulses: Normal pulses.      Heart sounds: Murmur heard.   Pulmonary:      Effort: Pulmonary effort is normal.      Breath sounds: Normal breath sounds.   Abdominal:      General: Abdomen is flat. Bowel sounds are normal.      Palpations: Abdomen is soft.   Musculoskeletal:         General: No swelling. Normal range of motion.      Cervical back: Neck supple.   Skin:     General: Skin is warm and dry.      Coloration: Skin is not jaundiced.   Neurological:      General: No focal deficit present.      Mental Status: He is alert and oriented to person, place, and time. Mental status is at baseline.   Psychiatric:         Mood and Affect: Mood normal.         Behavior: Behavior normal.         Thought Content: Thought content normal.         Judgment: Judgment normal.        Result Review :   No results found for: \"PROBNP\", " "\"BNP\"  CMP          9/23/2024    09:28   CMP   Glucose 106    BUN 15    Creatinine 1.17    EGFR 64.2    Sodium 136    Potassium 5.5    Chloride 102    Calcium 10.2    Total Protein 7.8    Albumin 3.8    Globulin 4.0    Total Bilirubin 0.3    Alkaline Phosphatase 118    AST (SGOT) 19    ALT (SGPT) 21    Albumin/Globulin Ratio 1.0    BUN/Creatinine Ratio 12.8    Anion Gap 7.7      CBC w/diff          9/23/2024    09:28   CBC w/Diff   WBC 7.01    RBC 4.59    Hemoglobin 14.0    Hematocrit 41.8    MCV 91.1    MCH 30.5    MCHC 33.5    RDW 12.9    Platelets 243    Neutrophil Rel % 51.5    Immature Granulocyte Rel % 0.3    Lymphocyte Rel % 37.2    Monocyte Rel % 8.0    Eosinophil Rel % 2.3    Basophil Rel % 0.7       Lipid Panel          9/23/2024    09:28   Lipid Panel   Total Cholesterol 100    Triglycerides 114    HDL Cholesterol 38    VLDL Cholesterol 21    LDL Cholesterol  41    LDL/HDL Ratio 1.03       Lab Results   Component Value Date    TSH 2.280 03/31/2023    TSH 2.300 08/07/2019      Lab Results   Component Value Date    FREET4 1.12 03/31/2023    FREET4 1.1 08/07/2019      A1C Last 3 Results          9/23/2024    09:28   HGBA1C Last 3 Results   Hemoglobin A1C 6.90       PSA          9/23/2024    09:28   PSA   PSA 2.150                     Visit Diagnoses:    ICD-10-CM ICD-9-CM   1. Type 2 diabetes mellitus with diabetic neuropathy, with long-term current use of insulin  E11.40 250.60    Z79.4 357.2     V58.67   2. Gastroesophageal reflux disease without esophagitis  K21.9 530.81   3. Hypertension, essential  I10 401.9   4. Mixed hyperlipidemia  E78.2 272.2   5. Vitamin D deficiency  E55.9 268.9   6. Screening PSA (prostate specific antigen)  Z12.5 V76.44   7. RLS (restless legs syndrome)  G25.81 333.94   8. Heart murmur  R01.1 785.2   9. Hyperkalemia  E87.5 276.7       Assessment and Plan   Diagnoses and all orders for this visit:    1. Type 2 diabetes mellitus with diabetic neuropathy, with long-term current use " of insulin (Primary)  -     Comprehensive Metabolic Panel; Future  -     Hemoglobin A1c; Future    2. Gastroesophageal reflux disease without esophagitis  -     Comprehensive Metabolic Panel; Future  -     Hemoglobin A1c; Future    3. Hypertension, essential  -     Comprehensive Metabolic Panel; Future  -     Hemoglobin A1c; Future    4. Mixed hyperlipidemia  -     Comprehensive Metabolic Panel; Future  -     Hemoglobin A1c; Future    5. Vitamin D deficiency  -     Comprehensive Metabolic Panel; Future  -     Hemoglobin A1c; Future    6. Screening PSA (prostate specific antigen)  -     Comprehensive Metabolic Panel; Future  -     Hemoglobin A1c; Future    7. RLS (restless legs syndrome)  -     Comprehensive Metabolic Panel; Future  -     Hemoglobin A1c; Future    8. Heart murmur  -     Comprehensive Metabolic Panel; Future  -     Hemoglobin A1c; Future    9. Hyperkalemia  -     Basic Metabolic Panel; Future  -     Comprehensive Metabolic Panel; Future  -     Hemoglobin A1c; Future    Hyperkalemia September 30, 2024, etiology would be hemolysis versus ACE inhibitor may need to stop medication switch to a different blood pressure medication, discussed with patient, will recheck, may need to switch lisinopril to a different blood pressure medication, i.e. hydralazine, probably avoid beta-blockers at this point, discussed September 2024    Heart murmur --- echocardiogram March 20, 2024 shows normal ejection fraction diastolic dysfunction grade 1, mild to moderate aortic valve stenosis is present, discussed with patient about yearly follow-up at this point,    colonoscopy/EGD,, Dr. Shepard, August 2022, diverticulosis, internal hemorrhoids,, hiatal hernia otherwise upper GI unremarkable    Chest pain --cardiac stress testing--May 4, 2022,- myocardial perfusion imaging showed normal myocardial perfusion study ----no evidence of ischemia May 3, 2022, low risk study normal EKG stress test, normal ejection fraction,  previously seen Dr. Harrison    abd pain , wgt loss, back pain, r/o pancreatic isssues, pud ,------- CT scan abdomen and pelvis August 2019,---did not show any evidence of pancreatitis or intra-abdominal pathology, ------S/P EGD WITH DR OLSON OCT 2019, -he has some small nodular areas in both lung bases and some interstitial changes -- CT scan of the chest March 2020,  reticular-nodular pattern in the lung bases, NO specific infiltrates or groundglass opacities are noted, F/U CT CHEST SEPT 2020--NO CHANGES, NO SIGNIFICANT OR worrisome lesions per radiologist, chronic lung changes noted,     Type 2 diabetes --cont , Lantus 40 units daily,, metformin 500 mg daily ozempic 1mg q weekly, hemoglobin A1c 6.9, September 2024    Hypertension -continues-lisinopril 20 mg daily, amlodipine 10 mg, half a tablet daily     Elevated cholesterol-, continues Lipitor 10 mg daily    Gastroesophageal reflux --- continues Nexium 40 mg daily    Restless leg syndrome 10 years--- continues Mirapex,     allergies,--- continue Zyrtec 10 mg daily    Previous left total knee arthroplasty, right ankle fusion foot fusion    Depression, PTSD by history ----currently not taking any medications    [facility] J[facility] 2021    ed on prn sildenafil      PSA 2.1 September 23, 2024             Follow Up   Return in about 6 months (around 3/30/2025).  Patient was given instructions and counseling regarding his condition or for health maintenance advice. Please see specific information pulled into the AVS if appropriate.           Answers submitted by the patient for this visit:  Other (Submitted on 9/28/2024)  Please describe your symptoms.: Followup  Have you had these symptoms before?: No  How long have you been having these symptoms?: Greater than 2 weeks  Primary Reason for Visit (Submitted on 9/28/2024)  What is the primary reason for your visit?: Problem Not Listed

## 2024-10-11 ENCOUNTER — LAB (OUTPATIENT)
Dept: LAB | Facility: HOSPITAL | Age: 79
End: 2024-10-11
Payer: MEDICARE

## 2024-10-11 DIAGNOSIS — E87.5 HYPERKALEMIA: ICD-10-CM

## 2024-10-11 LAB
ANION GAP SERPL CALCULATED.3IONS-SCNC: 10 MMOL/L (ref 5–15)
BUN SERPL-MCNC: 14 MG/DL (ref 8–23)
BUN/CREAT SERPL: 9.9 (ref 7–25)
CALCIUM SPEC-SCNC: 10.1 MG/DL (ref 8.6–10.5)
CHLORIDE SERPL-SCNC: 100 MMOL/L (ref 98–107)
CO2 SERPL-SCNC: 26 MMOL/L (ref 22–29)
CREAT SERPL-MCNC: 1.41 MG/DL (ref 0.76–1.27)
EGFRCR SERPLBLD CKD-EPI 2021: 51.3 ML/MIN/1.73
GLUCOSE SERPL-MCNC: 123 MG/DL (ref 65–99)
POTASSIUM SERPL-SCNC: 5 MMOL/L (ref 3.5–5.2)
SODIUM SERPL-SCNC: 136 MMOL/L (ref 136–145)

## 2024-10-11 PROCEDURE — 36415 COLL VENOUS BLD VENIPUNCTURE: CPT

## 2024-10-11 PROCEDURE — 80048 BASIC METABOLIC PNL TOTAL CA: CPT

## 2024-11-18 ENCOUNTER — LAB (OUTPATIENT)
Dept: LAB | Facility: HOSPITAL | Age: 79
End: 2024-11-18
Payer: MEDICARE

## 2024-11-18 DIAGNOSIS — N28.9 KIDNEY FUNCTION ABNORMAL: ICD-10-CM

## 2024-11-18 LAB
ANION GAP SERPL CALCULATED.3IONS-SCNC: 9.9 MMOL/L (ref 5–15)
BUN SERPL-MCNC: 13 MG/DL (ref 8–23)
BUN/CREAT SERPL: 10.6 (ref 7–25)
CALCIUM SPEC-SCNC: 9.9 MG/DL (ref 8.6–10.5)
CHLORIDE SERPL-SCNC: 103 MMOL/L (ref 98–107)
CO2 SERPL-SCNC: 24.1 MMOL/L (ref 22–29)
CREAT SERPL-MCNC: 1.23 MG/DL (ref 0.76–1.27)
EGFRCR SERPLBLD CKD-EPI 2021: 60.5 ML/MIN/1.73
GLUCOSE SERPL-MCNC: 126 MG/DL (ref 65–99)
POTASSIUM SERPL-SCNC: 5.6 MMOL/L (ref 3.5–5.2)
SODIUM SERPL-SCNC: 137 MMOL/L (ref 136–145)

## 2024-11-18 PROCEDURE — 80048 BASIC METABOLIC PNL TOTAL CA: CPT

## 2024-11-18 PROCEDURE — 36415 COLL VENOUS BLD VENIPUNCTURE: CPT

## 2024-11-25 ENCOUNTER — OFFICE VISIT (OUTPATIENT)
Dept: INTERNAL MEDICINE | Age: 79
End: 2024-11-25
Payer: MEDICARE

## 2024-11-25 VITALS
TEMPERATURE: 98 F | DIASTOLIC BLOOD PRESSURE: 66 MMHG | HEART RATE: 85 BPM | OXYGEN SATURATION: 96 % | SYSTOLIC BLOOD PRESSURE: 120 MMHG | BODY MASS INDEX: 29.49 KG/M2 | HEIGHT: 70 IN | WEIGHT: 206 LBS

## 2024-11-25 DIAGNOSIS — E78.2 MIXED HYPERLIPIDEMIA: ICD-10-CM

## 2024-11-25 DIAGNOSIS — E87.1 HYPONATREMIA: ICD-10-CM

## 2024-11-25 DIAGNOSIS — I10 HYPERTENSION, ESSENTIAL: ICD-10-CM

## 2024-11-25 DIAGNOSIS — E87.5 HYPERKALEMIA: Primary | ICD-10-CM

## 2024-11-25 PROCEDURE — 3078F DIAST BP <80 MM HG: CPT | Performed by: INTERNAL MEDICINE

## 2024-11-25 PROCEDURE — 1126F AMNT PAIN NOTED NONE PRSNT: CPT | Performed by: INTERNAL MEDICINE

## 2024-11-25 PROCEDURE — 3074F SYST BP LT 130 MM HG: CPT | Performed by: INTERNAL MEDICINE

## 2024-11-25 PROCEDURE — 99213 OFFICE O/P EST LOW 20 MIN: CPT | Performed by: INTERNAL MEDICINE

## 2024-11-25 RX ORDER — CHLORTHALIDONE 25 MG/1
25 TABLET ORAL DAILY
Qty: 90 TABLET | Refills: 3 | Status: SHIPPED | OUTPATIENT
Start: 2024-11-25

## 2024-11-25 NOTE — PROGRESS NOTES
"CHIEF COMPLAINT/ HPI: Patient is not had any symptoms of hyperkalemia i.e. palpitations presyncope symptoms etc., he says he is doing well he is not eating any bananas, he is taking his medications feels well  Hypertension (Follow up on labs/ Potassium. )              Objective   Vital Signs  Vitals:    11/25/24 1019   BP: 120/66   BP Location: Left arm   Patient Position: Sitting   Pulse: 85   Temp: 98 °F (36.7 °C)   SpO2: 96%   Weight: 93.4 kg (206 lb)   Height: 177.8 cm (70\")      Body mass index is 29.56 kg/m².  Review of Systems   Constitutional: Negative.    HENT: Negative.     Eyes: Negative.    Respiratory: Negative.     Cardiovascular: Negative.    Gastrointestinal: Negative.    Endocrine: Negative.    Genitourinary: Negative.    Musculoskeletal: Negative.    Allergic/Immunologic: Negative.    Neurological: Negative.    Hematological: Negative.    Psychiatric/Behavioral: Negative.        Physical Exam  Constitutional:       General: He is not in acute distress.     Appearance: Normal appearance.   HENT:      Head: Normocephalic.   Cardiovascular:      Rate and Rhythm: Normal rate and regular rhythm.      Pulses: Normal pulses.      Heart sounds: Normal heart sounds.   Pulmonary:      Effort: Pulmonary effort is normal.      Breath sounds: Normal breath sounds.   Musculoskeletal:         General: No swelling. Normal range of motion.      Cervical back: Neck supple.   Skin:     General: Skin is warm and dry.      Coloration: Skin is not jaundiced.   Neurological:      General: No focal deficit present.      Mental Status: He is alert and oriented to person, place, and time. Mental status is at baseline.   Psychiatric:         Mood and Affect: Mood normal.         Behavior: Behavior normal.         Thought Content: Thought content normal.         Judgment: Judgment normal.        Result Review :   No results found for: \"PROBNP\", \"BNP\"  CMP          9/23/2024    09:28 10/11/2024    09:43 11/18/2024    08:59 "   CMP   Glucose 106  123  126    BUN 15  14  13    Creatinine 1.17  1.41  1.23    EGFR 64.2  51.3  60.5    Sodium 136  136  137    Potassium 5.5  5.0  5.6    Chloride 102  100  103    Calcium 10.2  10.1  9.9    Total Protein 7.8      Albumin 3.8      Globulin 4.0      Total Bilirubin 0.3      Alkaline Phosphatase 118      AST (SGOT) 19      ALT (SGPT) 21      Albumin/Globulin Ratio 1.0      BUN/Creatinine Ratio 12.8  9.9  10.6    Anion Gap 7.7  10.0  9.9      CBC w/diff          9/23/2024    09:28   CBC w/Diff   WBC 7.01    RBC 4.59    Hemoglobin 14.0    Hematocrit 41.8    MCV 91.1    MCH 30.5    MCHC 33.5    RDW 12.9    Platelets 243    Neutrophil Rel % 51.5    Immature Granulocyte Rel % 0.3    Lymphocyte Rel % 37.2    Monocyte Rel % 8.0    Eosinophil Rel % 2.3    Basophil Rel % 0.7       Lipid Panel          9/23/2024    09:28   Lipid Panel   Total Cholesterol 100    Triglycerides 114    HDL Cholesterol 38    VLDL Cholesterol 21    LDL Cholesterol  41    LDL/HDL Ratio 1.03       Lab Results   Component Value Date    TSH 2.280 03/31/2023    TSH 2.300 08/07/2019      Lab Results   Component Value Date    FREET4 1.12 03/31/2023    FREET4 1.1 08/07/2019      A1C Last 3 Results          9/23/2024    09:28   HGBA1C Last 3 Results   Hemoglobin A1C 6.90       PSA          9/23/2024    09:28   PSA   PSA 2.150                     Visit Diagnoses:    ICD-10-CM ICD-9-CM   1. Hyperkalemia  E87.5 276.7   2. Hypertension, essential  I10 401.9   3. Mixed hyperlipidemia  E78.2 272.2   4. Hyponatremia  E87.1 276.1       Assessment and Plan   Diagnoses and all orders for this visit:    1. Hyperkalemia (Primary)  -     chlorthalidone (HYGROTON) 25 MG tablet; Take 1 tablet by mouth Daily.  Dispense: 90 tablet; Refill: 3  -     Basic Metabolic Panel; Future    2. Hypertension, essential  -     chlorthalidone (HYGROTON) 25 MG tablet; Take 1 tablet by mouth Daily.  Dispense: 90 tablet; Refill: 3  -     Basic Metabolic Panel; Future    3.  Mixed hyperlipidemia  -     chlorthalidone (HYGROTON) 25 MG tablet; Take 1 tablet by mouth Daily.  Dispense: 90 tablet; Refill: 3  -     Basic Metabolic Panel; Future    4. Hyponatremia  -     chlorthalidone (HYGROTON) 25 MG tablet; Take 1 tablet by mouth Daily.  Dispense: 90 tablet; Refill: 3  -     Basic Metabolic Panel; Future        Hyperkalemia September 30, 2024, and again November 25, 2024, etiology would be hemolysis versus ACE inhibitor may need to stop medication switch to a different blood pressure medication,--- we will stop lisinopril completely, switch to amlodipine and  chlorthalidone 25 mg daily, will follow-up chemistry panel in about 1 month as of December 2024        Heart murmur --- echocardiogram March 20, 2024 shows normal ejection fraction diastolic dysfunction grade 1, mild to moderate aortic valve stenosis is present, discussed with patient about yearly follow-up at this point,    colonoscopy/EGD,, Dr. Shepard, August 2022, diverticulosis, internal hemorrhoids,, hiatal hernia otherwise upper GI unremarkable    Chest pain --cardiac stress testing--May 4, 2022,- myocardial perfusion imaging showed normal myocardial perfusion study ----no evidence of ischemia May 3, 2022, low risk study normal EKG stress test, normal ejection fraction, previously seen Dr. Harrison    abd pain , wgt loss, back pain, r/o pancreatic isssues, pud ,------- CT scan abdomen and pelvis August 2019,---did not show any evidence of pancreatitis or intra-abdominal pathology, ------S/P EGD WITH DR OLSON OCT 2019, -he has some small nodular areas in both lung bases and some interstitial changes -- CT scan of the chest March 2020,  reticular-nodular pattern in the lung bases, NO specific infiltrates or groundglass opacities are noted, F/U CT CHEST SEPT 2020--NO CHANGES, NO SIGNIFICANT OR worrisome lesions per radiologist, chronic lung changes noted,     Type 2 diabetes --cont , Lantus 40 units daily,, metformin 500 mg daily  ozempic 1mg q weekly, hemoglobin A1c 6.9, September 2024    Hypertension -continues-, stopping lisinopril 20 mg daily,, starting chlorthalidone 25 mg daily, amlodipine 10 mg, half a tablet daily     Elevated cholesterol-, continues Lipitor 10 mg daily    Gastroesophageal reflux --- continues Nexium 40 mg daily    Restless leg syndrome 10 years--- continues Mirapex,     allergies,--- continue Zyrtec 10 mg daily    Previous left total knee arthroplasty, right ankle fusion foot fusion    Depression, PTSD by history ----currently not taking any medications    [facility]    ed on prn sildenafil      PSA 2.1 September 23, 2024               Follow Up   No follow-ups on file.  Patient was given instructions and counseling regarding his condition or for health maintenance advice. Please see specific information pulled into the AVS if appropriate.

## 2024-12-05 ENCOUNTER — OFFICE VISIT (OUTPATIENT)
Dept: DIABETES SERVICES | Facility: HOSPITAL | Age: 79
End: 2024-12-05
Payer: MEDICARE

## 2024-12-05 VITALS
DIASTOLIC BLOOD PRESSURE: 81 MMHG | BODY MASS INDEX: 28.72 KG/M2 | SYSTOLIC BLOOD PRESSURE: 140 MMHG | HEART RATE: 95 BPM | OXYGEN SATURATION: 95 % | HEIGHT: 70 IN | WEIGHT: 200.6 LBS

## 2024-12-05 DIAGNOSIS — E11.40 TYPE 2 DIABETES MELLITUS WITH DIABETIC NEUROPATHY, WITH LONG-TERM CURRENT USE OF INSULIN: ICD-10-CM

## 2024-12-05 DIAGNOSIS — Z79.4 CONTROLLED TYPE 2 DIABETES MELLITUS WITH HYPERGLYCEMIA, WITH LONG-TERM CURRENT USE OF INSULIN: ICD-10-CM

## 2024-12-05 DIAGNOSIS — Z79.4 TYPE 2 DIABETES MELLITUS WITH DIABETIC NEUROPATHY, WITH LONG-TERM CURRENT USE OF INSULIN: ICD-10-CM

## 2024-12-05 DIAGNOSIS — E66.3 OVERWEIGHT (BMI 25.0-29.9): ICD-10-CM

## 2024-12-05 DIAGNOSIS — E11.65 CONTROLLED TYPE 2 DIABETES MELLITUS WITH HYPERGLYCEMIA, WITH LONG-TERM CURRENT USE OF INSULIN: ICD-10-CM

## 2024-12-05 DIAGNOSIS — E11.65 UNCONTROLLED TYPE 2 DIABETES MELLITUS WITH HYPERGLYCEMIA: Primary | ICD-10-CM

## 2024-12-05 LAB
EXPIRATION DATE: ABNORMAL
GLUCOSE BLDC GLUCOMTR-MCNC: 204 MG/DL (ref 70–99)
HBA1C MFR BLD: 7.2 % (ref 4.5–5.7)
Lab: ABNORMAL

## 2024-12-05 PROCEDURE — 82948 REAGENT STRIP/BLOOD GLUCOSE: CPT | Performed by: NURSE PRACTITIONER

## 2024-12-05 PROCEDURE — G0463 HOSPITAL OUTPT CLINIC VISIT: HCPCS | Performed by: NURSE PRACTITIONER

## 2024-12-05 PROCEDURE — 83036 HEMOGLOBIN GLYCOSYLATED A1C: CPT | Performed by: NURSE PRACTITIONER

## 2024-12-05 RX ORDER — INSULIN LISPRO 100 [IU]/ML
0-10 INJECTION, SOLUTION INTRAVENOUS; SUBCUTANEOUS 3 TIMES DAILY
Qty: 27 ML | Refills: 1 | Status: SHIPPED | OUTPATIENT
Start: 2024-12-05 | End: 2025-06-03

## 2024-12-05 NOTE — PATIENT INSTRUCTIONS
Take Humalog insulin to correct glucose levels over 150 mg/dl as follows:  Check your blood sugar 15-20 min prior to eating and take as directed below:    If blood glucose is less than 150 mg/dl - 0 units  If blood glucose is between 151 and 175 - 2 units  If blood glucose is between 176 and 200 - 3 units  If blood glucose is between 201 and 225 - 4 units  If blood glucose is between 226 and 250 - 5 units  If blood glucose is between 251 and 275 - 6 units  If blood glucose is between 276 and 300 - 7 units  If blood glucose is between 301 and 325 - 8 units  If blood glucose is between 326 and 350 - 9 units  If blood glucose is 351 or above - 10 units    [FreeTextEntry1] : Patient last seen in 2018 here for checkup feels that her ears are clogged little itchy massive cerumen impaction bilaterally curetted out feels that her nose also at times gets congested endoscopically no tumors masses or polyps but there is a deviated nasal septum to the left put her on Flonase nasal spray to use as needed otherwise no further acute interventions indicated she will follow-up with us on an annual basis.

## 2024-12-05 NOTE — PROGRESS NOTES
Chief Complaint  Follow-up (Med Mgt., CGM Eval ,A1C Eval /Would like to discuss insulin )    Referred By: Juarez Arrington, *    Subjective          Sajan Hermosillo presents to [facility] for diabetes medication management    History of Present Illness    Visit type:  follow-up  Diabetes type:  Type 2  Current diabetes status/concerns/issues: Reports his blood sugar has been higher. States he hates his CGM because it makes him more aware of his blood sugar level and he does not want to know his numbers.  States it alarms soon after eating. .[facility] Semgleee to half his dose which is 25 units but he tapered it up to 35 units qd due to hyperglycemia. States they also switched his metformin to ER. States he is having an issue with constipation which may be related to Ozempic.    Other health concerns:  reports his PCP discontinued lisinopril due to hyperkalemia. He was switched to cholorthalidone   Current Diabetes symptoms:    Polyuria: No   Polydipsia: No   Polyphagia: Yes     Blurred vision: No   Excessive fatigue: Yes    Known Diabetes complications:  Neuropathy: Numbness, Tingling, and Other: compression ; Location: Feet  Renal: Stage II mild (GFR = 60-89 mL/min)  Eyes: No current eye exam available in record; Location: N/A; Last Eye Exam:  1/24 ; Location: [facility]  Amputation/Wounds: None  GI: Constipation, Diarrhea, Reflux, and Indigestion  Cardiovascular: Hypertension, Hyperlipidemia, and CVA (History of)  ED: Patient Reported  Other: None  Hypoglycemia:  None reported at this time and 0% per CGM  Hypoglycemia Symptoms:  No hypoglycemia at this time  Current diabetes treatment:  Ozempic 2mg once wk, metformin 1000mg qd, Semglee 35 units q hs   Blood glucose device:  Barbara CGM  Blood glucose monitoring frequency:  Continuous per CGM  Blood glucose range/average:  The 14-day sensor report shows an average glucose of 163 mg/dL, with 72% in  "target range ( mgdL), 24% in the high range (181-250 mg/dL), 4% in the very high range (>250 mg/dL), 0% in the low range (54-70 mg/dL) and 0% in the very low range (<54 mg/dL).   Glucose Source: Device Reviewed  Dietary behavior:  Avoids sugary drinks, \"Eat what I want\"/No diet plan, Number of meals each day - 3; Number of snacks each day - 1  Activity/Exercise:   active around the house    Past Medical History:   Diagnosis Date    Abdominal pain     Ankle pain     Arthritis     Coronary artery disease     Fracture     Heel pain     High cholesterol     HL (hearing loss)     HTN (hypertension)     Hyperlipemia     Ingrowing toenail     Limb swelling     Neuropathy in diabetes 2021    Non-rheumatic aortic stenosis     SEES DR LALA    Plantar fasciitis 2004    Pneumonia June 22 2023    NONE CURRENTLY    Prostate disorder     Seasonal allergies     Sleep apnea     Stroke 2008    NO RESIDUAL    Type 2 diabetes mellitus     BG      Past Surgical History:   Procedure Laterality Date    CAST APPLICATION Left 5/10/2024    Procedure: CAST APPLICATION LEG;  Surgeon: Jeff Gross DPM;  Location: [facility] MAIN OR;  Service: Podiatry;  Laterality: Left;    COLONOSCOPY  2019    Dr. Gomes    COLONOSCOPY N/A 08/22/2022    Procedure: COLONOSCOPY;  Surgeon: Robert Koo MD;  Location: [facility] ENDOSCOPY;  Service: Gastroenterology;  Laterality: N/A;  HEMORRHOIDS, DIVERTICULOSIS    ENDOSCOPY  2019    Dr. Gomes    ENDOSCOPY N/A 08/22/2022    Procedure: ESOPHAGOGASTRODUODENOSCOPY with biopsy;  Surgeon: Robert Koo MD;  Location: [facility] ENDOSCOPY;  Service: Gastroenterology;  Laterality: N/A;  SMALL HIATAL HERNIA    FOOT FRACTURE SURGERY  1999    FOOT SURGERY Right     JOINT REPLACEMENT      MTP JOINT FUSION Left 5/10/2024    Procedure: LEFT GREAT TOE JOINT FUSION;  Surgeon: Jeff Gross DPM;  Location: [facility] MAIN OR;  Service: Podiatry;  Laterality: Left;    OTHER SURGICAL " HISTORY      Artificial joints/limbs    OTHER SURGICAL HISTORY      Metal Implants    REPLACEMENT TOTAL KNEE Left      Family History   Problem Relation Age of Onset    Heart disease Mother     Diabetes Mother         unspecified type    Arthritis Mother     Heart disease Father     Arthritis Father     Heart disease Sister     Diabetes Sister         unspecified type    Heart disease Brother     Arthritis Brother     Diabetes Sister     Colon cancer Neg Hx     Malig Hyperthermia Neg Hx      Social History     Socioeconomic History    Marital status:    Tobacco Use    Smoking status: Former     Current packs/day: 0.00     Average packs/day: 2.0 packs/day for 12.6 years (25.2 ttl pk-yrs)     Types: Cigarettes, Pipe, Cigars     Start date: 1963     Quit date: 1975     Years since quittin.3     Passive exposure: Past    Smokeless tobacco: Former   Vaping Use    Vaping status: Never Used   Substance and Sexual Activity    Alcohol use: Not Currently    Drug use: Never    Sexual activity: Yes     Partners: Female     Birth control/protection: Tubal ligation, Surgical     Allergies   Allergen Reactions    Hydrocodone Rash    Hydrocodone-Acetaminophen Rash     PT CAN TAKE ACETAMINOPHEN AND IBUPROFEN    Hydrocodone-Ibuprofen Rash    Oxycodone Rash    Penicillins Rash       Current Outpatient Medications:     amLODIPine (NORVASC) 10 MG tablet, Take 0.5 tablets by mouth Daily., Disp: , Rfl:     aspirin 81 MG EC tablet, Take 1 tablet by mouth Daily., Disp: , Rfl:     atorvastatin (LIPITOR) 40 MG tablet, atorvastatin 40 mg oral tablet take 1 tablet (40 mg) by oral route once daily   Active, Disp: , Rfl:     carboxymethylcellulose (REFRESH PLUS) 0.5 % solution, 1 drop., Disp: , Rfl:     chlorthalidone (HYGROTON) 25 MG tablet, Take 1 tablet by mouth Daily., Disp: 90 tablet, Rfl: 3    Cholecalciferol 25 MCG (1000 UT) tablet, Take 2 tablets by mouth Daily., Disp: , Rfl:     esomeprazole (nexIUM) 40 MG capsule,  "Nexium 40 mg oral capsule,delayed release(DR/EC) take 1 capsule (40 mg) by oral route once daily   Active, Disp: , Rfl:     fluticasone (Flonase Allergy Relief) 50 MCG/ACT nasal spray, Administer 1 spray into the nostril(s) as directed by provider Daily., Disp: , Rfl:     ibuprofen (ADVIL,MOTRIN) 600 MG tablet, Take 1 tablet by mouth Every 6 (Six) Hours As Needed for Mild Pain., Disp: , Rfl:     Insulin Glargine-yfgn (SEMGLEE-YFGN) 100 UNIT/ML, Inject 50 Units under the skin into the appropriate area as directed Every Night., Disp: , Rfl:     ketoconazole (NIZORAL) 2 % cream, ketoconazole 2 % topical cream apply to the affected area(s) by topical route once daily   Suspended, Disp: , Rfl:     metFORMIN (GLUCOPHAGE) 1000 MG tablet, Take 1 tablet by mouth Daily. INSTRUCTED PT TO TAKE BY 6PM THE NIGHT PRIOR TO SURGERY AND DO NOT TAKE DAY OF SURGERY, Disp: , Rfl:     pramipexole (MIRAPEX) 0.5 MG tablet, Take 1 tablet by mouth Daily., Disp: , Rfl:     Semaglutide, 1 MG/DOSE, (OZEMPIC) 2 MG/1.5ML solution pen-injector, Inject 2 mg under the skin into the appropriate area as directed 1 (One) Time Per Week. INSTRUCTED PT TO STOP 7 DAYS PRIOR TO SURGERY, LAST DOSE 5-2-24, Disp: , Rfl:     vitamin B-12 (CYANOCOBALAMIN) 500 MCG tablet, Take 1 tablet by mouth Daily., Disp: , Rfl:     Insulin Lispro, 1 Unit Dial, (HumaLOG KwikPen) 100 UNIT/ML solution pen-injector, Inject 0-10 Units under the skin into the appropriate area as directed 3 times a day for 180 days., Disp: 27 mL, Rfl: 1    Objective     Vitals:    12/05/24 0956   BP: 140/81   Pulse: 95   SpO2: 95%   Weight: 91 kg (200 lb 9.6 oz)   Height: 177.8 cm (70\")     Body mass index is 28.78 kg/m².    Physical Exam  Constitutional:       Appearance: Normal appearance. He is normal weight.      Comments: Overweight (BMI 25 - 29.9) Pt Current BMI = 28.78     HENT:      Head: Normocephalic and atraumatic.      Right Ear: External ear normal.      Left Ear: External ear normal. "      Nose: Nose normal.   Eyes:      Extraocular Movements: Extraocular movements intact.      Conjunctiva/sclera: Conjunctivae normal.   Pulmonary:      Effort: Pulmonary effort is normal.   Musculoskeletal:         General: Normal range of motion.      Cervical back: Normal range of motion.   Skin:     General: Skin is warm and dry.   Neurological:      General: No focal deficit present.      Mental Status: He is alert and oriented to person, place, and time. Mental status is at baseline.   Psychiatric:         Mood and Affect: Mood normal.         Behavior: Behavior normal.         Thought Content: Thought content normal.         Judgment: Judgment normal.             Result Review :   The following data was reviewed by: WESLEY Braga on 12/05/2024:    Most Recent A1C          12/5/2024    10:04   HGBA1C Most Recent   Hemoglobin A1C 7.2        A1C Last 3 Results          9/23/2024    09:28 12/5/2024    10:04   HGBA1C Last 3 Results   Hemoglobin A1C 6.90  7.2      A1c collected in the office today is 7.2%, indicating Uncontrolled Type II diabetes.  This result is up from the prior result of 6.9% collected on 9/23/24     Glucose   Date Value Ref Range Status   12/05/2024 204 (H) 70 - 99 mg/dL Final     Comment:     Serial Number: [id number]:  [id number]     Point of care glucose in the office today is  elevated at 204mg/dl    Creatinine   Date Value Ref Range Status   11/18/2024 1.23 0.76 - 1.27 mg/dL Final   10/11/2024 1.41 (H) 0.76 - 1.27 mg/dL Final     eGFR   Date Value Ref Range Status   11/18/2024 60.5 >60.0 mL/min/1.73 Final   10/11/2024 51.3 (L) >60.0 mL/min/1.73 Final     Labs collected on 11/18/24 show Normal values    Microalbumin, Urine   Date Value Ref Range Status   09/03/2024 <1.2 mg/dL Final     Creatinine, Urine   Date Value Ref Range Status   09/03/2024 69.6 mg/dL Final     Microalbumin/Creatinine Ratio   Date Value Ref Range Status   09/03/2024   Final     Comment:     Unable  to calculate     Urine microalbuminuria collected on 9/3/24 is negative for microalbuminuria    Total Cholesterol   Date Value Ref Range Status   09/23/2024 100 0 - 200 mg/dL Final   03/31/2023 122 0 - 200 mg/dL Final     Triglycerides   Date Value Ref Range Status   09/23/2024 114 0 - 150 mg/dL Final   03/31/2023 99 0 - 150 mg/dL Final     HDL Cholesterol   Date Value Ref Range Status   09/23/2024 38 (L) 40 - 60 mg/dL Final   03/31/2023 45 40 - 60 mg/dL Final     LDL Cholesterol    Date Value Ref Range Status   09/23/2024 41 0 - 100 mg/dL Final   03/31/2023 58 0 - 100 mg/dL Final     Lipid panel collected on 9/23/24 shows low HDL            Assessment: Patient is here today for 3-month follow-up on his diabetes.Reports his blood sugar has been higher. States he hates his CGM because it makes him more aware of his blood sugar level and he does not want to know his numbers.  States it alarms soon after eating.  States he would like to eat a meal without concern for hyperglycemia..[facility] Semgleee to half his dose which is 25 units but he tapered it up to 35 units qd due to hyperglycemia. States they also switched his metformin to ER. States he is having an issue with constipation which may be related to Ozempic.  Reviewed CGM report with patient in the office today.The 14-day sensor report shows an average glucose of 163 mg/dL, with 72% in target range ( mgdL), 24% in the high range (181-250 mg/dL), 4% in the very high range (>250 mg/dL), 0% in the low range (54-70 mg/dL) and 0% in the very low range (<54 mg/dL).  His A1c in the office today is 7.2% which is up from his previous A1c of 6.9% in September.      Diagnoses and all orders for this visit:    1. Uncontrolled type 2 diabetes mellitus with hyperglycemia (Primary)    2. Type 2 diabetes mellitus with diabetic neuropathy, with long-term current use of insulin  -     POC Glycosylated Hemoglobin (Hb A1C)  -     POC Glucose  Fingerstick    3. Controlled type 2 diabetes mellitus with hyperglycemia, with long-term current use of insulin  -     Insulin Lispro, 1 Unit Dial, (HumaLOG KwikPen) 100 UNIT/ML solution pen-injector; Inject 0-10 Units under the skin into the appropriate area as directed 3 times a day for 180 days.  Dispense: 27 mL; Refill: 1    4. Overweight (BMI 25.0-29.9)        Plan: Ordered a Humalog sliding scale as follows to prevent postprandial hyperglycemia :  Take Humalog insulin to correct glucose levels over 150 mg/dl as follows:  Check your blood sugar 15-20 min prior to eating and take as directed below:    If blood glucose is less than 150 mg/dl - 0 units  If blood glucose is between 151 and 175 - 2 units  If blood glucose is between 176 and 200 - 3 units  If blood glucose is between 201 and 225 - 4 units  If blood glucose is between 226 and 250 - 5 units  If blood glucose is between 251 and 275 - 6 units  If blood glucose is between 276 and 300 - 7 units  If blood glucose is between 301 and 325 - 8 units  If blood glucose is between 326 and 350 - 9 units  If blood glucose is 351 or above - 10 units   [facility] so I will [facility] Dr. Berg to see if he will approve the Humalog.  Scheduled a 3-month follow-up and we will review his CGM and recheck his A1c at that time.    The patient will monitor his blood glucose levels per CGM.  If he develops problematic hyperglycemia or hypoglycemia or adverse drug reactions, he will contact the office for further instructions.        Follow Up     Return in about 3 months (around 3/5/2025) for Medication Mgmt, CGM Start.    Patient was given instructions and counseling regarding his condition or for health maintenance advice. Please see specific information pulled into the AVS if appropriate.     Kirstie Sung, APRN  12/05/2024      Dictated Utilizing Dragon Dictation.  Please note that portions of this note were completed with a  voice recognition program.  Part of this note may be an electronic transcription/translation of spoken language to printed text using the Dragon Dictation System.

## 2024-12-26 ENCOUNTER — LAB (OUTPATIENT)
Dept: LAB | Facility: HOSPITAL | Age: 79
End: 2024-12-26
Payer: MEDICARE

## 2024-12-26 DIAGNOSIS — E87.5 HYPERKALEMIA: ICD-10-CM

## 2024-12-26 DIAGNOSIS — E78.2 MIXED HYPERLIPIDEMIA: ICD-10-CM

## 2024-12-26 DIAGNOSIS — E87.1 HYPONATREMIA: ICD-10-CM

## 2024-12-26 DIAGNOSIS — I10 HYPERTENSION, ESSENTIAL: ICD-10-CM

## 2024-12-26 LAB
ANION GAP SERPL CALCULATED.3IONS-SCNC: 12.6 MMOL/L (ref 5–15)
BUN SERPL-MCNC: 23 MG/DL (ref 8–23)
BUN/CREAT SERPL: 18.5 (ref 7–25)
CALCIUM SPEC-SCNC: 9.5 MG/DL (ref 8.6–10.5)
CHLORIDE SERPL-SCNC: 97 MMOL/L (ref 98–107)
CO2 SERPL-SCNC: 26.4 MMOL/L (ref 22–29)
CREAT SERPL-MCNC: 1.24 MG/DL (ref 0.76–1.27)
EGFRCR SERPLBLD CKD-EPI 2021: 59.9 ML/MIN/1.73
GLUCOSE SERPL-MCNC: 136 MG/DL (ref 65–99)
POTASSIUM SERPL-SCNC: 3.6 MMOL/L (ref 3.5–5.2)
SODIUM SERPL-SCNC: 136 MMOL/L (ref 136–145)

## 2024-12-26 PROCEDURE — 36415 COLL VENOUS BLD VENIPUNCTURE: CPT

## 2024-12-26 PROCEDURE — 80048 BASIC METABOLIC PNL TOTAL CA: CPT

## 2025-04-08 ENCOUNTER — HOSPITAL ENCOUNTER (OUTPATIENT)
Facility: HOSPITAL | Age: 80
Discharge: HOME | End: 2025-04-08
Admitting: INTERNAL MEDICINE
Payer: MEDICARE

## 2025-04-08 DIAGNOSIS — I35.0 NON-RHEUMATIC AORTIC STENOSIS: ICD-10-CM

## 2025-04-08 LAB
AORTIC DIMENSIONLESS INDEX: 0.36 (DI)
ASCENDING AORTA: 3 CM
AV MEAN PRESS GRAD SYS DOP V1V2: 19 MMHG
BH CV ECHO MEAS - ACS: 1.3 CM
BH CV ECHO MEAS - AO ROOT DIAM: 3.6 CM
BH CV ECHO MEAS - AO V2 VTI: 59.7 CM
BH CV ECHO MEAS - AVA(I,D): 1.12 CM2
BH CV ECHO MEAS - EDV(CUBED): 80.1 ML
BH CV ECHO MEAS - EDV(MOD-SP2): 97.2 ML
BH CV ECHO MEAS - EDV(MOD-SP4): 131 ML
BH CV ECHO MEAS - EF(MOD-SP2): 64.6 %
BH CV ECHO MEAS - EF(MOD-SP4): 63.1 %
BH CV ECHO MEAS - ESV(CUBED): 15.4 ML
BH CV ECHO MEAS - ESV(MOD-SP2): 34.4 ML
BH CV ECHO MEAS - ESV(MOD-SP4): 48.3 ML
BH CV ECHO MEAS - FS: 42.2 %
BH CV ECHO MEAS - IVS/LVPW: 0.91 CM
BH CV ECHO MEAS - IVSD: 0.97 CM
BH CV ECHO MEAS - LA DIMENSION: 3.7 CM
BH CV ECHO MEAS - LAT PEAK E' VEL: 4.7 CM/SEC
BH CV ECHO MEAS - LV DIASTOLIC VOL/BSA (35-75): 62.8 CM2
BH CV ECHO MEAS - LV MASS(C)D: 145.9 GRAMS
BH CV ECHO MEAS - LV MAX PG: 5.2 MMHG
BH CV ECHO MEAS - LV MEAN PG: 2 MMHG
BH CV ECHO MEAS - LV SYSTOLIC VOL/BSA (12-30): 23.1 CM2
BH CV ECHO MEAS - LV V1 MAX: 114 CM/SEC
BH CV ECHO MEAS - LV V1 VTI: 21.2 CM
BH CV ECHO MEAS - LVIDD: 4.3 CM
BH CV ECHO MEAS - LVIDS: 2.49 CM
BH CV ECHO MEAS - LVOT AREA: 3.1 CM2
BH CV ECHO MEAS - LVOT DIAM: 2 CM
BH CV ECHO MEAS - LVPWD: 1.06 CM
BH CV ECHO MEAS - MED PEAK E' VEL: 7.9 CM/SEC
BH CV ECHO MEAS - MV A MAX VEL: 89.4 CM/SEC
BH CV ECHO MEAS - MV DEC TIME: 0.29 SEC
BH CV ECHO MEAS - MV E MAX VEL: 54.5 CM/SEC
BH CV ECHO MEAS - MV E/A: 0.61
BH CV ECHO MEAS - SV(LVOT): 66.6 ML
BH CV ECHO MEAS - SV(MOD-SP2): 62.8 ML
BH CV ECHO MEAS - SV(MOD-SP4): 82.7 ML
BH CV ECHO MEAS - SVI(LVOT): 31.9 ML/M2
BH CV ECHO MEAS - SVI(MOD-SP2): 30.1 ML/M2
BH CV ECHO MEAS - SVI(MOD-SP4): 39.6 ML/M2
BH CV ECHO MEAS - TAPSE (>1.6): 1.61 CM
BH CV ECHO MEASUREMENTS AVERAGE E/E' RATIO: 8.65
BH CV XLRA - TDI S': 9.9 CM/SEC
IVRT: 103 MS
LEFT ATRIUM VOLUME INDEX: 9.8 ML/M2
LV EF BIPLANE MOD: 64.1 %

## 2025-04-08 PROCEDURE — 93306 TTE W/DOPPLER COMPLETE: CPT

## 2025-04-08 PROCEDURE — 93306 TTE W/DOPPLER COMPLETE: CPT | Performed by: INTERNAL MEDICINE

## 2025-04-08 PROCEDURE — 25510000001 PERFLUTREN 6.52 MG/ML SUSPENSION 2 ML VIAL: Performed by: INTERNAL MEDICINE

## 2025-04-08 RX ADMIN — PERFLUTREN 2 ML: 6.52 INJECTION, SUSPENSION INTRAVENOUS at 15:29

## 2025-04-11 ENCOUNTER — OFFICE VISIT (OUTPATIENT)
Dept: CARDIOLOGY | Facility: CLINIC | Age: 80
End: 2025-04-11
Payer: MEDICARE

## 2025-04-11 VITALS
WEIGHT: 198.2 LBS | BODY MASS INDEX: 28.37 KG/M2 | HEIGHT: 70 IN | SYSTOLIC BLOOD PRESSURE: 124 MMHG | HEART RATE: 61 BPM | DIASTOLIC BLOOD PRESSURE: 84 MMHG

## 2025-04-11 DIAGNOSIS — I35.0 NON-RHEUMATIC AORTIC STENOSIS: Primary | ICD-10-CM

## 2025-04-11 DIAGNOSIS — I10 HYPERTENSION, ESSENTIAL: ICD-10-CM

## 2025-04-11 DIAGNOSIS — E78.2 HYPERLIPEMIA, MIXED: ICD-10-CM

## 2025-04-11 RX ORDER — INSULIN GLARGINE 100 [IU]/ML
100 INJECTION, SOLUTION SUBCUTANEOUS DAILY
COMMUNITY
Start: 2025-02-21

## 2025-04-11 NOTE — PROGRESS NOTES
Chief Complaint  Follow-up (1 year)    Subjective            Sajan Hermosillo presents to [facility] CARDIOLOGY      Mr. Hermosillo is here for follow-up evaluation management of dyspnea, mild CAD by previous cath, essential hypertension, mixed hyperlipidemia.  He is doing well.  He denies chest pain, palpitations or excessive shortness of breath.  He is compliant with his medical therapy.    PMH  Past Medical History:   Diagnosis Date    Abdominal pain     Ankle pain     Arthritis     Coronary artery disease     Fracture     Heel pain     High cholesterol     HL (hearing loss)     HTN (hypertension)     Hyperlipemia     Ingrowing toenail     Limb swelling     Neuropathy in diabetes 2021    Non-rheumatic aortic stenosis     SEES DR LALA    Plantar fasciitis 2004    Pneumonia June 22 2023    NONE CURRENTLY    Prostate disorder     Seasonal allergies     Sleep apnea     Stroke 2008    NO RESIDUAL    Type 2 diabetes mellitus     BG          SURGICALHX  Past Surgical History:   Procedure Laterality Date    CAST APPLICATION Left 5/10/2024    Procedure: CAST APPLICATION LEG;  Surgeon: Jeff Gross DPM;  Location: [facility] MAIN OR;  Service: Podiatry;  Laterality: Left;    COLONOSCOPY  2019    Dr. Gomes    COLONOSCOPY N/A 08/22/2022    Procedure: COLONOSCOPY;  Surgeon: Robert Koo MD;  Location: [facility] ENDOSCOPY;  Service: Gastroenterology;  Laterality: N/A;  HEMORRHOIDS, DIVERTICULOSIS    ENDOSCOPY  2019    Dr. Gomes    ENDOSCOPY N/A 08/22/2022    Procedure: ESOPHAGOGASTRODUODENOSCOPY with biopsy;  Surgeon: Robert Koo MD;  Location: [facility] ENDOSCOPY;  Service: Gastroenterology;  Laterality: N/A;  SMALL HIATAL HERNIA    FOOT FRACTURE SURGERY  1999    FOOT SURGERY Right     JOINT REPLACEMENT      MTP JOINT FUSION Left 5/10/2024    Procedure: LEFT GREAT TOE JOINT FUSION;  Surgeon: Jeff Gross DPM;  Location: [facility] MAIN OR;  Service: Podiatry;  Laterality:  Left;    OTHER SURGICAL HISTORY      Artificial joints/limbs    OTHER SURGICAL HISTORY      Metal Implants    REPLACEMENT TOTAL KNEE Left         SOC  Social History     Socioeconomic History    Marital status:    Tobacco Use    Smoking status: Former     Current packs/day: 0.00     Average packs/day: 2.0 packs/day for 12.6 years (25.2 ttl pk-yrs)     Types: Cigarettes, Pipe, Cigars     Start date: 1963     Quit date: 1975     Years since quittin.7     Passive exposure: Past    Smokeless tobacco: Former   Vaping Use    Vaping status: Never Used   Substance and Sexual Activity    Alcohol use: Not Currently    Drug use: Never    Sexual activity: Yes     Partners: Female     Birth control/protection: Tubal ligation, Surgical         FAMHX  Family History   Problem Relation Age of Onset    Heart disease Mother     Diabetes Mother         unspecified type    Arthritis Mother     Heart disease Father     Arthritis Father     Heart disease Sister     Diabetes Sister         unspecified type    Heart disease Brother     Arthritis Brother     Diabetes Sister     Colon cancer Neg Hx     Malig Hyperthermia Neg Hx           ALLERGY  Allergies   Allergen Reactions    Hydrocodone Rash    Hydrocodone-Acetaminophen Rash     PT CAN TAKE ACETAMINOPHEN AND IBUPROFEN    Hydrocodone-Ibuprofen Rash    Oxycodone Rash    Penicillins Rash        MEDSCURRENT    Current Outpatient Medications:     amLODIPine (NORVASC) 10 MG tablet, Take 0.5 tablets by mouth Daily., Disp: , Rfl:     aspirin 81 MG EC tablet, Take 1 tablet by mouth Daily., Disp: , Rfl:     atorvastatin (LIPITOR) 40 MG tablet, atorvastatin 40 mg oral tablet take 1 tablet (40 mg) by oral route once daily   Active, Disp: , Rfl:     carboxymethylcellulose (REFRESH PLUS) 0.5 % solution, 1 drop., Disp: , Rfl:     chlorthalidone (HYGROTON) 25 MG tablet, Take 1 tablet by mouth Daily., Disp: 90 tablet, Rfl: 3    Cholecalciferol 25 MCG (1000 UT) tablet, Take 2 tablets  by mouth Daily., Disp: , Rfl:     esomeprazole (nexIUM) 40 MG capsule, Nexium 40 mg oral capsule,delayed release(DR/EC) take 1 capsule (40 mg) by oral route once daily   Active, Disp: , Rfl:     fluticasone (Flonase Allergy Relief) 50 MCG/ACT nasal spray, Administer 1 spray into the nostril(s) as directed by provider Daily., Disp: , Rfl:     ibuprofen (ADVIL,MOTRIN) 600 MG tablet, Take 1 tablet by mouth Every 6 (Six) Hours As Needed for Mild Pain., Disp: , Rfl:     Insulin Glargine-yfgn (SEMGLEE-YFGN) 100 UNIT/ML, Inject 50 Units under the skin into the appropriate area as directed Every Night., Disp: , Rfl:     ketoconazole (NIZORAL) 2 % cream, ketoconazole 2 % topical cream apply to the affected area(s) by topical route once daily   Suspended, Disp: , Rfl:     Lantus SoloStar 100 UNIT/ML injection pen, Inject 100 Units under the skin into the appropriate area as directed Daily., Disp: , Rfl:     metFORMIN (GLUCOPHAGE) 1000 MG tablet, Take 1 tablet by mouth Daily. INSTRUCTED PT TO TAKE BY 6PM THE NIGHT PRIOR TO SURGERY AND DO NOT TAKE DAY OF SURGERY, Disp: , Rfl:     pramipexole (MIRAPEX) 0.5 MG tablet, Take 1 tablet by mouth Daily., Disp: , Rfl:     Semaglutide, 1 MG/DOSE, (OZEMPIC) 2 MG/1.5ML solution pen-injector, Inject 2 mg under the skin into the appropriate area as directed 1 (One) Time Per Week. INSTRUCTED PT TO STOP 7 DAYS PRIOR TO SURGERY, LAST DOSE 5-2-24, Disp: , Rfl:     vitamin B-12 (CYANOCOBALAMIN) 500 MCG tablet, Take 1 tablet by mouth Daily., Disp: , Rfl:     Insulin Lispro, 1 Unit Dial, (HumaLOG KwikPen) 100 UNIT/ML solution pen-injector, Inject 0-10 Units under the skin into the appropriate area as directed 3 times a day for 180 days. (Patient not taking: Reported on 4/11/2025), Disp: 27 mL, Rfl: 1      Review of Systems   Cardiovascular:  Negative for chest pain and dyspnea on exertion.   Respiratory:  Negative for shortness of breath.         Objective     /84   Pulse 61   Ht 177.8 cm  "(70\")   Wt 89.9 kg (198 lb 3.2 oz)   BMI 28.44 kg/m²       General Appearance:   well developed  well nourished  HENT:   oropharynx moist  lips not cyanotic  Neck:  thyroid not enlarged  supple  Respiratory:  no respiratory distress  normal breath sounds  no rales  Cardiovascular:  no jugular venous distention  regular rhythm  apical impulse normal  S1 normal, S2 normal  no S3, no S4   Grade 2 right parasternal systolic murmur  no rub, no thrill  carotid pulses normal; no bruit  pedal pulses normal  lower extremity edema: none    Musculoskeletal:  no clubbing of fingers.   normocephalic, head atraumatic  Skin:   warm, dry  Psychiatric:  judgement and insight appropriate  normal mood and affect      Result Review :     The following data was reviewed by: Angel Harrison MD on 03/31/2023:    CMP          10/11/2024    09:43 11/18/2024    08:59 12/26/2024    11:02   CMP   Glucose 123  126  136    BUN 14  13  23    Creatinine 1.41  1.23  1.24    EGFR 51.3  60.5  59.9    Sodium 136  137  136    Potassium 5.0  5.6  3.6    Chloride 100  103  97    Calcium 10.1  9.9  9.5    BUN/Creatinine Ratio 9.9  10.6  18.5    Anion Gap 10.0  9.9  12.6      CBC          9/23/2024    09:28   CBC   WBC 7.01    RBC 4.59    Hemoglobin 14.0    Hematocrit 41.8    MCV 91.1    MCH 30.5    MCHC 33.5    RDW 12.9    Platelets 243      Lipid Panel          9/23/2024    09:28   Lipid Panel   Total Cholesterol 100    Triglycerides 114    HDL Cholesterol 38    VLDL Cholesterol 21    LDL Cholesterol  41    LDL/HDL Ratio 1.03              Data reviewed : Recent echo reviewed, normal biventricular function, mild to moderate aortic stenosis, no significant change compared to previous study      Procedures               Assessment and Plan        ASSESSMENT:  Encounter Diagnoses   Name Primary?    Non-rheumatic aortic stenosis Yes    Hypertension, essential     Hyperlipemia, mixed          PLAN:    1.  Mild to moderate aortic stenosis by " recent echo-no change over the past year.  Continue clinical follow-up.  He is asymptomatic.  2.  Mild CAD-continue statin therapy for primary prevention.  3.  Essential hypertension-controlled, continue current medical therapy  4.  Mixed hyperlipidemia-LDL is at goal, continue statin therapy    Follow-up in 1 year unless clinical problems arise.          Patient was given instructions and counseling regarding his condition or for health maintenance advice. Please see specific information pulled into the AVS if appropriate.             ANJANA Harrison MD  4/11/2025    13:08 EDT

## 2025-08-19 ENCOUNTER — OFFICE VISIT (OUTPATIENT)
Dept: PODIATRY | Facility: CLINIC | Age: 80
End: 2025-08-19
Payer: MEDICARE

## 2025-08-19 DIAGNOSIS — E11.8 DM FEET: ICD-10-CM

## 2025-08-19 DIAGNOSIS — G62.2 POLYNEUROPATHY DUE TO OTHER TOXIC AGENTS: ICD-10-CM

## 2025-08-19 DIAGNOSIS — E11.42 TYPE 2 DIABETES MELLITUS WITH POLYNEUROPATHY: ICD-10-CM

## 2025-08-19 DIAGNOSIS — G62.9 NEUROPATHY: ICD-10-CM

## 2025-08-19 DIAGNOSIS — Z77.098 AGENT ORANGE EXPOSURE: ICD-10-CM

## 2025-08-19 DIAGNOSIS — M20.5X2 HALLUX LIMITUS OF LEFT FOOT: Primary | ICD-10-CM

## 2025-08-19 DIAGNOSIS — E11.9 NON-INSULIN DEPENDENT TYPE 2 DIABETES MELLITUS: ICD-10-CM

## 2025-08-19 PROCEDURE — 99213 OFFICE O/P EST LOW 20 MIN: CPT | Performed by: PODIATRIST

## 2025-08-19 PROCEDURE — 1159F MED LIST DOCD IN RCRD: CPT | Performed by: PODIATRIST

## 2025-08-19 PROCEDURE — 1160F RVW MEDS BY RX/DR IN RCRD: CPT | Performed by: PODIATRIST

## (undated) DEVICE — EXTREMITY-LF: Brand: MEDLINE INDUSTRIES, INC.

## (undated) DEVICE — INTENDED FOR TISSUE SEPARATION, AND OTHER PROCEDURES THAT REQUIRE A SHARP SURGICAL BLADE TO PUNCTURE OR CUT.: Brand: BARD-PARKER ® CARBON RIB-BACK BLADES

## (undated) DEVICE — APPL CHLORAPREP HI/LITE 26ML ORNG

## (undated) DEVICE — GAUZE,SPONGE,4"X4",16PLY,STRL,LF,10/TRAY: Brand: MEDLINE

## (undated) DEVICE — COVER,C-ARM,41X74: Brand: MEDLINE

## (undated) DEVICE — SUT ETHLN 3-0 FS118IN 663H

## (undated) DEVICE — SUT VIC 3/0 SH 27IN J416H

## (undated) DEVICE — P28, K-WIRE, 1.6 X 150 MM, SINGLE TROCAR, SMOOTH, SS
Type: IMPLANTABLE DEVICE | Site: FOOT | Status: NON-FUNCTIONAL
Brand: MULTI SYSTEM
Removed: 2024-05-10

## (undated) DEVICE — Device: Brand: DEFENDO AIR/WATER/SUCTION AND BIOPSY VALVE

## (undated) DEVICE — GLV SURG SENSICARE PI ORTHO SZ9 LF STRL

## (undated) DEVICE — SYR LL TP 10ML STRL

## (undated) DEVICE — PENCL SMOKE/EVAC MEGADYNE TELESCP 10FT

## (undated) DEVICE — UNDERCAST PADDING: Brand: DEROYAL

## (undated) DEVICE — TBG PENCL TELESCP MEGADYNE SMOKE EVAC 10FT

## (undated) DEVICE — COLON KIT: Brand: MEDLINE INDUSTRIES, INC.

## (undated) DEVICE — GOWN,REINF,POLY,SIRUS,BRTH SLV,XLNG/XXL: Brand: MEDLINE

## (undated) DEVICE — BNDG ESMARK 4IN 12FT LF STRL BLU

## (undated) DEVICE — SINGLE-USE BIOPSY FORCEPS: Brand: RADIAL JAW 4

## (undated) DEVICE — UNDYED BRAIDED (POLYGLACTIN 910), SYNTHETIC ABSORBABLE SUTURE: Brand: COATED VICRYL

## (undated) DEVICE — DRAPE,TOWEL,LARGE,INVISISHIELD: Brand: MEDLINE

## (undated) DEVICE — MTP SPIN GUARD™ FEMALE REAMER, 23MM: Brand: BABY GORILLA®/GORILLA® PLATING SYSTEM

## (undated) DEVICE — MTP SPIN GUARD™ MALE REAMER, 23MM: Brand: BABY GORILLA®/GORILLA® PLATING SYSTEM

## (undated) DEVICE — GLV SURG SENSICARE ORTHO PF LF 9 STRL

## (undated) DEVICE — EGD OR ERCP KIT: Brand: MEDLINE INDUSTRIES, INC.

## (undated) DEVICE — HYPODERMIC SAFETY NEEDLE: Brand: MONOJECT

## (undated) DEVICE — SOL IRRG H2O PL/BG 1000ML STRL